# Patient Record
Sex: MALE | Race: BLACK OR AFRICAN AMERICAN | NOT HISPANIC OR LATINO | Employment: OTHER | URBAN - METROPOLITAN AREA
[De-identification: names, ages, dates, MRNs, and addresses within clinical notes are randomized per-mention and may not be internally consistent; named-entity substitution may affect disease eponyms.]

---

## 2018-01-23 ENCOUNTER — DOCUMENTATION ONLY (OUTPATIENT)
Dept: SMOKING CESSATION | Facility: CLINIC | Age: 58
End: 2018-01-23

## 2018-01-23 NOTE — PROGRESS NOTES
"Successful contact at [phone]. Spoke with patient regarding rescheduling missed SCON. He states" I don't need the program because I have not smoke in two weeks". Dicussed the benefits of the program to assist in his quit attempt. States he does not want to participate in the program at this time. Informed patient the program is available when he is ready.   "

## 2018-03-06 ENCOUNTER — CLINICAL SUPPORT (OUTPATIENT)
Dept: SMOKING CESSATION | Facility: CLINIC | Age: 58
End: 2018-03-06
Payer: COMMERCIAL

## 2018-03-06 DIAGNOSIS — F17.210 LIGHT CIGARETTE SMOKER (1-9 CIGARETTES PER DAY): Primary | ICD-10-CM

## 2018-03-06 PROCEDURE — 99404 PREV MED CNSL INDIV APPRX 60: CPT | Mod: S$GLB,,,

## 2018-03-06 PROCEDURE — 99999 PR PBB SHADOW E&M-EST. PATIENT-LVL I: CPT | Mod: PBBFAC,,,

## 2018-03-06 RX ORDER — DIPHENHYDRAMINE HCL 25 MG
4 CAPSULE ORAL
Qty: 50 EACH | Refills: 0 | Status: SHIPPED | OUTPATIENT
Start: 2018-03-06 | End: 2021-10-27 | Stop reason: CLARIF

## 2018-03-06 RX ORDER — IBUPROFEN 200 MG
1 TABLET ORAL DAILY
Qty: 14 PATCH | Refills: 0 | Status: SHIPPED | OUTPATIENT
Start: 2018-03-06 | End: 2018-04-11 | Stop reason: SDUPTHER

## 2018-03-06 NOTE — Clinical Note
Patient here for 1st quit attempt. States he smokes 6-7 cigarettes daily. Reviewed program goals. Obtained medical hx, please review. Agreed to participate in weekly tobacco cessation office  sessions. Will begin the prescribed tobacco cessation medication regime of 14 mg nicotine patch and 4 mg gum. Educated patient how to use and side effects of medications.

## 2018-03-06 NOTE — PROGRESS NOTES
Patient here for 1st quit attempt. States he smokes 6-7 cigarettes daily. Reviewed program goals. Agreed to participate in weekly tobacco cessation office  sessions. Will begin the prescribed tobacco cessation medication regime of 14 mg nicotine patch and 4 mg gum. Educated patient how to use and side effects of medications.

## 2018-03-19 ENCOUNTER — CLINICAL SUPPORT (OUTPATIENT)
Dept: SMOKING CESSATION | Facility: CLINIC | Age: 58
End: 2018-03-19
Payer: COMMERCIAL

## 2018-03-19 DIAGNOSIS — F17.210 CIGARETTE NICOTINE DEPENDENCE, UNCOMPLICATED: Primary | ICD-10-CM

## 2018-03-19 PROCEDURE — 99999 PR PBB SHADOW E&M-EST. PATIENT-LVL I: CPT | Mod: PBBFAC,,,

## 2018-03-19 PROCEDURE — 99404 PREV MED CNSL INDIV APPRX 60: CPT | Mod: S$GLB,,,

## 2018-03-19 RX ORDER — BUMETANIDE 1 MG/1
1 TABLET ORAL 2 TIMES DAILY
COMMUNITY

## 2018-03-19 RX ORDER — AMLODIPINE BESYLATE 10 MG/1
10 TABLET ORAL DAILY
COMMUNITY

## 2018-03-19 RX ORDER — METHOCARBAMOL 500 MG/1
500 TABLET, FILM COATED ORAL 2 TIMES DAILY
COMMUNITY

## 2018-03-19 RX ORDER — ALBUTEROL SULFATE 90 UG/1
2 AEROSOL, METERED RESPIRATORY (INHALATION) EVERY 4 HOURS PRN
COMMUNITY

## 2018-03-19 RX ORDER — LOVASTATIN 40 MG/1
40 TABLET ORAL NIGHTLY
COMMUNITY

## 2018-03-19 RX ORDER — TAMSULOSIN HYDROCHLORIDE 0.4 MG/1
0.4 CAPSULE ORAL DAILY
COMMUNITY

## 2018-03-19 RX ORDER — SPIRONOLACTONE 25 MG/1
25 TABLET ORAL DAILY
COMMUNITY

## 2018-03-19 RX ORDER — CETIRIZINE HYDROCHLORIDE 10 MG/1
10 TABLET ORAL DAILY
COMMUNITY

## 2018-03-19 RX ORDER — MONTELUKAST SODIUM 10 MG/1
10 TABLET ORAL NIGHTLY
COMMUNITY

## 2018-03-19 NOTE — Clinical Note
Patient has not smoked since 3/6/18. Commended him on no smoking. Encouraged him to journal cravings and will discussed it next visit. The patient will continue with tobacco cessation therapy sessions and medication monitoring by CTTS. Prescribed medication management will be by physician.

## 2018-03-19 NOTE — PROGRESS NOTES
Individual Follow-Up Form    3/19/2018    Quit Date: 3/7/18    Clinical Status of Patient: Outpatient    Length of Service: 60 minutes    Continuing Medication: yes  Patches    Other Medications: gum     Target Symptoms: Withdrawal and medication side effects. The following were  rated moderate (3) to severe (4) on TCRS:  · Moderate (3): desire, anxious  · Severe (4): none    Comments: Patient here for first tobacco cessation session. He states no smoking since 3/7. Commended him on no smoking. Reports has difficulty with patch staying on skin due to sweating. Advised to use tape to hold it in place. Use patch sometime but he does use gum to replace cigarette. Reviewed triggers, habits and strategies. Encouraged him to use patch as daily to assist with nicotine withdrawal symptoms. Will journal cravings and discuss next visit. The patient will continue with therapy sessions and medication monitoring by CTTS. Prescribed medication management will be by physician. The patient denies any abnormal behavioral or mental changes at this time.       Diagnosis: F17.210    Next Visit: 1 week

## 2018-03-27 ENCOUNTER — CLINICAL SUPPORT (OUTPATIENT)
Dept: SMOKING CESSATION | Facility: CLINIC | Age: 58
End: 2018-03-27
Payer: COMMERCIAL

## 2018-03-27 DIAGNOSIS — F17.210 CIGARETTE NICOTINE DEPENDENCE, UNCOMPLICATED: Primary | ICD-10-CM

## 2018-03-27 PROCEDURE — 99999 PR PBB SHADOW E&M-EST. PATIENT-LVL I: CPT | Mod: PBBFAC,,,

## 2018-03-27 PROCEDURE — 99404 PREV MED CNSL INDIV APPRX 60: CPT | Mod: S$GLB,,,

## 2018-03-27 RX ORDER — KETOTIFEN FUMARATE 0.35 MG/ML
1 SOLUTION/ DROPS OPHTHALMIC 2 TIMES DAILY
COMMUNITY

## 2018-03-27 RX ORDER — LATANOPROST 50 UG/ML
1 SOLUTION/ DROPS OPHTHALMIC NIGHTLY
COMMUNITY

## 2018-03-27 NOTE — PROGRESS NOTES
Individual Follow-Up Form    3/27/2018    Quit Date: 3/7/2018    Clinical Status of Patient: Outpatient    Length of Service: 60 minutes    Continuing Medication: yes  Patches    Other Medications: lozenges     Target Symptoms: Withdrawal and medication side effects. The following were  rated moderate (3) to severe (4) on TCRS:  · Moderate (3): desrie  · Severe (4): none    Comments: Patient here for second tobacco cessation session. He reports moderate tobacco cravings but has not had a lapse at this time. Reviewed urges and cravings . Discussed  how nicotine affects the heart and lungs. Understanding smoking is habit and making this quit a lifestyle change. Reviewed trigger,habits and strategies. He has great support from his son and family members.The patient denies any abnormal behavioral or mental changes at this time. The patient will continue with group therapy sessions and medication monitoring by CTTS. Prescribed medication management will be by physician.     Diagnosis: F17.210    Next Visit: 1 week

## 2018-03-27 NOTE — Clinical Note
Patient reports no lapse at this time. Commended on quit thus far. Reviewed urges and cravings. The patient denies any abnormal behavioral or mental changes at this time.

## 2018-04-11 ENCOUNTER — CLINICAL SUPPORT (OUTPATIENT)
Dept: SMOKING CESSATION | Facility: CLINIC | Age: 58
End: 2018-04-11
Payer: COMMERCIAL

## 2018-04-11 DIAGNOSIS — F17.210 LIGHT CIGARETTE SMOKER (1-9 CIGARETTES PER DAY): ICD-10-CM

## 2018-04-11 DIAGNOSIS — F17.210 CIGARETTE NICOTINE DEPENDENCE, UNCOMPLICATED: Primary | ICD-10-CM

## 2018-04-11 PROCEDURE — 99404 PREV MED CNSL INDIV APPRX 60: CPT | Mod: S$GLB,,,

## 2018-04-11 PROCEDURE — 99999 PR PBB SHADOW E&M-EST. PATIENT-LVL I: CPT | Mod: PBBFAC,,,

## 2018-04-11 RX ORDER — IBUPROFEN 200 MG
1 TABLET ORAL DAILY
Qty: 14 PATCH | Refills: 0 | Status: SHIPPED | OUTPATIENT
Start: 2018-04-11 | End: 2021-10-27 | Stop reason: CLARIF

## 2018-04-11 NOTE — PROGRESS NOTES
Individual Follow-Up Form    4/11/2018    Quit Date: 3/7/2018    Clinical Status of Patient: Outpatient    Length of Service: 60 minutes    Continuing Medication: yes  Patches    Other Medications: none     Target Symptoms: Withdrawal and medication side effects. The following were  rated moderate (3) to severe (4) on TCRS:  · Moderate (3): none  · Severe (4): none    Comments: Patient here for third tobacco cessation session. He reports doing well with patch. Has experienced some high risk situations and did not smoke. Commended him on using tools learned from program to replace cigarette. Reviewed strategies, controlling environment, cues, triggers, new goals set. Introduced high risk situations with preparation interventions, caffeine similarities with withdrawal issues of habit and nicotine, Alcohol, Understanding urges, cravings, stress and relaxation. Open discussion with intervention discussion. Refilled patches. The patient denies any abnormal behavioral or mental changes at this time.     Diagnosis: F17.210    Next Visit: 2 weeks

## 2018-04-11 NOTE — Clinical Note
Patient is maintaining fquit well at this time. No lapse. Patient remains on prescribed tobacco cessation medication regimen of 14 mg patch without any negative side effects at this time.

## 2018-04-25 ENCOUNTER — CLINICAL SUPPORT (OUTPATIENT)
Dept: SMOKING CESSATION | Facility: CLINIC | Age: 58
End: 2018-04-25
Payer: COMMERCIAL

## 2018-04-25 DIAGNOSIS — F17.210 CIGARETTE NICOTINE DEPENDENCE, UNCOMPLICATED: Primary | ICD-10-CM

## 2018-04-25 PROCEDURE — 99999 PR PBB SHADOW E&M-EST. PATIENT-LVL I: CPT | Mod: PBBFAC,,,

## 2018-04-25 PROCEDURE — 99404 PREV MED CNSL INDIV APPRX 60: CPT | Mod: S$GLB,,,

## 2018-04-25 NOTE — Clinical Note
Patient remains tobacco free. The patient remains on the prescribed tobacco cessation medication regimen of 14 mg patch without any negative side effects at this time.

## 2018-05-09 ENCOUNTER — CLINICAL SUPPORT (OUTPATIENT)
Dept: SMOKING CESSATION | Facility: CLINIC | Age: 58
End: 2018-05-09
Payer: COMMERCIAL

## 2018-05-09 DIAGNOSIS — F17.210 CIGARETTE NICOTINE DEPENDENCE, UNCOMPLICATED: Primary | ICD-10-CM

## 2018-05-09 PROCEDURE — 99999 PR PBB SHADOW E&M-EST. PATIENT-LVL I: CPT | Mod: PBBFAC,,,

## 2018-05-09 PROCEDURE — 99404 PREV MED CNSL INDIV APPRX 60: CPT | Mod: S$GLB,,,

## 2018-05-09 NOTE — Clinical Note
Patient managing quit well. No lapse at this time. Discontinued patch due to skin allergic reaction. The patient denies any abnormal behavioral or mental changes at this time.

## 2018-05-09 NOTE — PROGRESS NOTES
Individual Follow-Up Form    5/9/2018    Quit Date: 3/7/2018    Clinical Status of Patient: Outpatient    Length of Service: 60 minutes    Continuing Medication: no    Other Medications: none     Target Symptoms: Withdrawal and medication side effects. The following were  rated moderate (3) to severe (4) on TCRS:  · Moderate (3): none   · Severe (4): none     Comments: Patient reports he had an allergic response to patch and has not worn it since last visit. Is managing tobacco behaviors well. No lapse or relapse. Celebrated patient no smoking without NRT.  Reviewed examples of lapses or relapses with patient and how to overcome if experienced. Discussed renewing benefits in two weeks. Patient will think about it. The patient denies any abnormal behavioral or mental changes at this time.          Diagnosis: F17.210    Next Visit: 1 week

## 2018-05-16 ENCOUNTER — CLINICAL SUPPORT (OUTPATIENT)
Dept: SMOKING CESSATION | Facility: CLINIC | Age: 58
End: 2018-05-16
Payer: COMMERCIAL

## 2018-05-16 DIAGNOSIS — F17.210 CIGARETTE NICOTINE DEPENDENCE, UNCOMPLICATED: Primary | ICD-10-CM

## 2018-05-16 PROCEDURE — 99999 PR PBB SHADOW E&M-EST. PATIENT-LVL I: CPT | Mod: PBBFAC,,,

## 2018-05-16 PROCEDURE — 99404 PREV MED CNSL INDIV APPRX 60: CPT | Mod: S$GLB,,,

## 2018-05-16 NOTE — PROGRESS NOTES
Individual Follow-Up Form    5/16/2018    Quit Date: 3/7/2018    Clinical Status of Patient: Outpatient    Length of Service: 60 minutes    Continuing Medication: no    Other Medications: none     Target Symptoms: Withdrawal and medication side effects. The following were  rated moderate (3) to severe (4) on TCRS:  · Moderate (3): none  · Severe (4): none    Comments: Patient here for last individual session. Excited to report he was able to remove himself from high risk situation and did not smoke. Celebrated patient accomplishment. Reviewed  strategies, cues, triggers, high risk situations, lapses, relapses, diet, exercise, stress, relaxation, sleep, habitual behavior, and life style changes. Discussed benefits are going to be renewed. States he would like to continue  program another 120 days. The patient denies any abnormal behavioral or mental changes at this time.     Diagnosis: F17.210    Next Visit: 2 weeks

## 2018-05-16 NOTE — Clinical Note
Patient completed tobacco cessation sessions. Quit on 3/7 and has not reported lapse or relapse at this time. Has not used patch in a couple of weeks and doing well without it.

## 2018-05-28 ENCOUNTER — CLINICAL SUPPORT (OUTPATIENT)
Dept: SMOKING CESSATION | Facility: CLINIC | Age: 58
End: 2018-05-28
Payer: COMMERCIAL

## 2018-05-28 DIAGNOSIS — F17.210 CIGARETTE NICOTINE DEPENDENCE, UNCOMPLICATED: Primary | ICD-10-CM

## 2018-05-28 PROCEDURE — 99407 BEHAV CHNG SMOKING > 10 MIN: CPT | Mod: S$GLB,,,

## 2018-05-28 PROCEDURE — 99999 PR PBB SHADOW E&M-EST. PATIENT-LVL I: CPT | Mod: PBBFAC,,,

## 2018-06-14 ENCOUNTER — TELEPHONE (OUTPATIENT)
Dept: SMOKING CESSATION | Facility: CLINIC | Age: 58
End: 2018-06-14

## 2018-06-14 NOTE — TELEPHONE ENCOUNTER
Attempted to contact patient at [phone] to follow up on quit status and reschedule missed appt. Left message.

## 2018-06-25 ENCOUNTER — CLINICAL SUPPORT (OUTPATIENT)
Dept: SMOKING CESSATION | Facility: CLINIC | Age: 58
End: 2018-06-25
Payer: COMMERCIAL

## 2018-06-25 DIAGNOSIS — F17.200 NICOTINE DEPENDENCE: Primary | ICD-10-CM

## 2018-06-25 PROCEDURE — 99407 BEHAV CHNG SMOKING > 10 MIN: CPT | Mod: S$GLB,,, | Performed by: INTERNAL MEDICINE

## 2018-06-25 NOTE — PROGRESS NOTES
Spoke with patient today in regard to smoking cessation progress for 3 month follow up, he states tobacco free. Commended patient on the accomplishment. Informed patient of benefit period, future follow ups, and contact information if any further help or support is needed. Will complete smart form for 3 month follow up on Quit attempt #1.

## 2018-09-07 ENCOUNTER — CLINICAL SUPPORT (OUTPATIENT)
Dept: SMOKING CESSATION | Facility: CLINIC | Age: 58
End: 2018-09-07
Payer: COMMERCIAL

## 2018-09-07 DIAGNOSIS — F17.200 NICOTINE DEPENDENCE: Primary | ICD-10-CM

## 2018-09-07 PROCEDURE — 99407 BEHAV CHNG SMOKING > 10 MIN: CPT | Mod: S$GLB,,, | Performed by: INTERNAL MEDICINE

## 2018-09-07 NOTE — PROGRESS NOTES
Spoke with patient today in regard to smoking cessation progress for 6 month follow up, he states tobacco free since 3/2018. Commended patient on the accomplishment. He states using the nicotine patches and strategies learned from program. Informed patient of benefit period, follow up at 1 year, and contact information if any further help or support is needed. Will complete smart form for 6 month follow up on Quit attempt #1.

## 2019-01-10 RX ORDER — ALBUTEROL SULFATE 1.25 MG/3ML
SOLUTION RESPIRATORY (INHALATION)
Qty: 75 ML | Refills: 0 | Status: SHIPPED | OUTPATIENT
Start: 2019-01-10

## 2019-04-23 ENCOUNTER — CLINICAL SUPPORT (OUTPATIENT)
Dept: SMOKING CESSATION | Facility: CLINIC | Age: 59
End: 2019-04-23
Payer: COMMERCIAL

## 2019-04-23 DIAGNOSIS — F17.200 NICOTINE DEPENDENCE: Primary | ICD-10-CM

## 2019-04-23 PROCEDURE — 99406 BEHAV CHNG SMOKING 3-10 MIN: CPT | Mod: S$GLB,,,

## 2019-04-23 PROCEDURE — 99406 PR TOBACCO USE CESSATION INTERMEDIATE 3-10 MINUTES: ICD-10-PCS | Mod: S$GLB,,,

## 2019-04-23 NOTE — PROGRESS NOTES
Spoke with patient today in regard to smoking cessation progress for 12 month follow up, he is currently tobacco free. Congratulated patient on the quit episode. Informed patient of benefit period,  and contact information if any further help or support is needed.  Completed  smart form for 12 month follow up on Quit .

## 2019-08-20 DIAGNOSIS — M51.36 DEGENERATION OF LUMBAR INTERVERTEBRAL DISC: ICD-10-CM

## 2019-08-20 DIAGNOSIS — M79.601 RIGHT UPPER LIMB PAIN: ICD-10-CM

## 2019-08-20 DIAGNOSIS — M16.11 PRIMARY OSTEOARTHRITIS OF RIGHT HIP: ICD-10-CM

## 2019-08-20 DIAGNOSIS — E66.01 MORBID OBESITY: Primary | ICD-10-CM

## 2019-09-12 ENCOUNTER — CLINICAL SUPPORT (OUTPATIENT)
Dept: REHABILITATION | Facility: OTHER | Age: 59
End: 2019-09-12
Payer: MEDICAID

## 2019-09-12 DIAGNOSIS — M54.50 CHRONIC BILATERAL LOW BACK PAIN WITHOUT SCIATICA: ICD-10-CM

## 2019-09-12 DIAGNOSIS — G89.29 CHRONIC BILATERAL LOW BACK PAIN WITHOUT SCIATICA: ICD-10-CM

## 2019-09-12 PROCEDURE — 97162 PT EVAL MOD COMPLEX 30 MIN: CPT | Mod: PN | Performed by: PHYSICAL THERAPIST
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
## 2019-09-26 ENCOUNTER — CLINICAL SUPPORT (OUTPATIENT)
Dept: REHABILITATION | Facility: OTHER | Age: 59
End: 2019-09-26
Payer: MEDICAID

## 2019-09-26 DIAGNOSIS — G89.29 CHRONIC BILATERAL LOW BACK PAIN WITHOUT SCIATICA: ICD-10-CM

## 2019-09-26 DIAGNOSIS — M54.50 CHRONIC BILATERAL LOW BACK PAIN WITHOUT SCIATICA: ICD-10-CM

## 2019-09-26 PROCEDURE — 97110 THERAPEUTIC EXERCISES: CPT | Mod: PN | Performed by: INTERNAL MEDICINE

## 2019-09-26 NOTE — PROGRESS NOTES
"[facility]  Physical Therapy treatment note      Name: Robinson Reardon Sr.  Clinic Number: [id number]     Therapy Diagnosis:        Encounter Diagnosis   Name Primary?    Chronic bilateral low back pain without sciatica        Physician: Leonor Gupta NP     Physician Orders: PT Eval and Treat   Medical Diagnosis from Referral:   E66.01 (ICD-10-CM) - Morbid (severe) obesity due to excess calories   M51.36 (ICD-10-CM) - Other intervertebral disc degeneration, lumbar region   M16.11 (ICD-10-CM) - Unilateral primary osteoarthritis, right hip   M79.601 (ICD-10-CM) - Pain in right arm      Evaluation Date: 9/12/2019  Authorization Period Expiration: 9/11/2020  Plan of Care Expiration: 12/6/2019  Visit # / Visits authorized: 2/2    visit date 9/26/2019     Time In:  9am  Time Out: 9:40am  Total Billable Time: 40 minutes     Precautions: Standard     Subjective   R ant thigh pain , doing hep . 10/10  Pain today. No heat or ice just ibuprofen for pain . Has a cane but doesn't use it stating he's just trying to push through pain and make it get better.             Pain:  Current  10/10   Location: right anterior thigh       Pts goals: "to get rid of this" and be able to get back to his normal routine without limitations      Objective      Observation: presents to clinic with slow antalgic gait no AD  Posture:  Forward flexed trunk approximately 25 deg with B hip/ knee flexion, decreased weight bearing RLE     9/12 Lumbar Range of Motion:     percentage Pain   Flexion 60/20: 40    painful         Extension -25/-15:10 deg from neutral    Painful R anterior thigh         Left Side Bending 10 painful         Right Side Bending 10 painful         Left rotation    25% painful         Right Rotation    25% painful            R hip ROM limited all planes <50%     9/12 Lower Extremity Strength  Right LE   Left LE     Knee extension: 4/5 Knee extension: 5/5   Knee flexion: 4+/5 Knee flexion: 5/5   Hip " flexion: 3-/5 Hip flexion: 3+/5   Ankle dorsiflexion: 5/5 Ankle dorsiflexion: 5/5            9/12 Neuro Dynamic Testing:               Sciatic nerve:                                       SLR:    R = neg                                      L = neg                                        Femoral Nerve:                          Femoral nerve test: positive R     9/12 Palpation: No TTP R quads, iliopsoas, QL      9/12 Flexibility: unable to tolerate     9/12 Function:  Supine to sit: mod I with increased time and effort  Sit to supine: min A RLE        CMS Impairment/Limitation/Restriction for FOTO Hip Survey     Therapist reviewed FOTO scores for Robinson Reardon  on 9/12/2019.   FOTO documents entered into EPIC - see Media section.     Limitation Score: 75%     Goal: 60%            TREATMENT         Robinson received therapeutic exercises to develop flexibility and core stabilization for 40 minutes including:  Semi reclined due to COPD    Transverse abdominus activation  2  Min ball sq   Diaphragmatic breathing x 3 min  Mini squats 15x  GSS slant  90 sec  ltr ball small oscill 2 min   Quad sets 10 x 5 sec   LAQ 15x   saq 15x   Heel slides with strap 15x  Bridges 15x  Quadruped cat/cow x 10- np     Manual therapy R long axis hip distraction 20 sec x 4 with relief reported. PROM R Hip flex , er , mr  In semi reclined positon as faustino 20 sec x 3.      Home Exercises and Patient Education Provided     Education provided:   - using sc on L UE to decrease gait deficits and decrease R hip inflammation    Written Home Exercises Provided: heel slides, LAQ, mini squats, clams  Exercises were reviewed and Robinson was able to demonstrate them prior to the end of the session.  Robinson demonstrated good  understanding of the education provided.      See EMR under Patient Instructions for exercises provided 9/12/2019, 9/26.     Assessment   Cont pain, decreased r hip rom and gait deficits.  Ed in pool benefits but no access to  pool    Pt prognosis is Fair.   Pt will benefit from skilled outpatient Physical Therapy to address the deficits stated above and in the chart below, provide pt/family education, and to maximize pt's level of independence.         Pt's spiritual, cultural and educational needs considered and patient is agreeable to the plan of care and goals as stated below:      Anticipated Barriers for therapy: previous PT with no benefit seen          Goals:  Short Term Goals: 6 weeks   1. Patient to demonstrate improved lumbar extension ROM by 25% or greater for improved posture and gait  2. Patient to report decreased pain in R thigh by 30% or greater with ADL's  3. Patient to be independent with all bed mobility and transfers      Long Term Goals: 12 weeks   1. Patient to be independent with home exercise program for improved self management of condition  2. Patient to have decreased subjective report of disability as noted by a score of 60% or less on the FOTO hip questionnaire   3. Patient to increased strength in RLE's by 1/2 grade or greater for improved performance of ADL's   4. Patient to increased AROM in R hip to WFL for improved performance of ADL's such as donning shoes        Plan   Plan of care Certification: 9/12/2019 to 12/6/2019.     Outpatient Physical Therapy 2 times weekly for 9 weeks to include the following interventions: Cervical/Lumbar Traction, Gait Training, Manual Therapy, Moist Heat/ Ice, Neuromuscular Re-ed, Patient Education, Therapeutic Activites, Therapeutic Exercise and dry needling prn.

## 2019-10-01 ENCOUNTER — CLINICAL SUPPORT (OUTPATIENT)
Dept: REHABILITATION | Facility: OTHER | Age: 59
End: 2019-10-01
Payer: MEDICAID

## 2019-10-01 DIAGNOSIS — M54.50 CHRONIC BILATERAL LOW BACK PAIN WITHOUT SCIATICA: ICD-10-CM

## 2019-10-01 DIAGNOSIS — G89.29 CHRONIC BILATERAL LOW BACK PAIN WITHOUT SCIATICA: ICD-10-CM

## 2019-10-01 PROCEDURE — 97110 THERAPEUTIC EXERCISES: CPT | Mod: PN | Performed by: PHYSICAL THERAPIST

## 2019-10-01 NOTE — PROGRESS NOTES
"[facility]  Physical Therapy treatment note      Name: Robinson Reardon SrAminta  Clinic Number: [id number]     Therapy Diagnosis:        Encounter Diagnosis   Name Primary?    Chronic bilateral low back pain without sciatica        Physician: Leonor Gupta NP     Physician Orders: PT Eval and Treat   Medical Diagnosis from Referral:   E66.01 (ICD-10-CM) - Morbid (severe) obesity due to excess calories   M51.36 (ICD-10-CM) - Other intervertebral disc degeneration, lumbar region   M16.11 (ICD-10-CM) - Unilateral primary osteoarthritis, right hip   M79.601 (ICD-10-CM) - Pain in right arm      Evaluation Date: 9/12/2019  Authorization Period Expiration: 9/11/2020  Plan of Care Expiration: 12/6/2019  Visit # / Visits authorized: 3/2    visit date 9/26/2019     Time In:  10:00 am  Time Out: 1045 am  Total Billable Time: 30 minutes     Precautions: Standard     Subjective   Pain worse with certain movements when walking or lifting his leg to get into bed.              Pain:  Current  10/10   Location: right anterior thigh       Pts goals: "to get rid of this" and be able to get back to his normal routine without limitations      Objective      Observation: presents to clinic with slow antalgic gait no AD  Posture:  Forward flexed trunk approximately 25 deg with B hip/ knee flexion, decreased weight bearing RLE     9/12 Lumbar Range of Motion:     percentage Pain   Flexion 60/20: 40    painful         Extension -25/-15:10 deg from neutral    Painful R anterior thigh         Left Side Bending 10 painful         Right Side Bending 10 painful         Left rotation    25% painful         Right Rotation    25% painful            R hip ROM limited all planes <50%     9/12 Lower Extremity Strength  Right LE   Left LE     Knee extension: 4/5 Knee extension: 5/5   Knee flexion: 4+/5 Knee flexion: 5/5   Hip flexion: 3-/5 Hip flexion: 3+/5   Ankle dorsiflexion: 5/5 Ankle dorsiflexion: 5/5            9/12 Neuro " Dynamic Testing:               Sciatic nerve:                                       SLR:    R = neg                                      L = neg                                        Femoral Nerve:                          Femoral nerve test: positive R     9/12 Palpation: No TTP R quads, iliopsoas, QL      9/12 Flexibility: unable to tolerate     9/12 Function:  Supine to sit: mod I with increased time and effort  Sit to supine: min A RLE        CMS Impairment/Limitation/Restriction for FOTO Hip Survey     Therapist reviewed FOTO scores for Robinson Reardon . on 9/12/2019.   FOTO documents entered into EPIC - see Media section.     Limitation Score: 75%     Goal: 60%            TREATMENT         Robinson received therapeutic exercises to develop flexibility and core stabilization for 40 minutes including:  Semi reclined due to COPD    Transverse abdominus activation  2  Min ball sq   Diaphragmatic breathing x 3 min  Mini squats 15x  GSS slant  90 sec  ltr ball small oscill 2 min   Quad sets 10 x 5 sec   LAQ 15x   saq 15x   Heel slides with strap 15x  Bridges 15x  Quadruped cat/cow x 10    Recumbent bike x 6 min    Manual therapy R long axis hip distraction 20 sec x 4 with relief reported. PROM R Hip flex , er , mr  In semi reclined positon as faustino 20 sec x 3.      Home Exercises and Patient Education Provided     Education provided:   - using sc on L UE to decrease gait deficits and decrease R hip inflammation    Written Home Exercises Provided: heel slides, LAQ, mini squats, clams  Exercises were reviewed and Robinson was able to demonstrate them prior to the end of the session.  Robinson demonstrated good  understanding of the education provided.      See EMR under Patient Instructions for exercises provided 9/12/2019, 9/26.     Assessment   Pt with increased R anterior thigh pain with transitional movements and weight bearing.     Pt prognosis is Fair.   Pt will benefit from skilled outpatient Physical Therapy to  address the deficits stated above and in the chart below, provide pt/family education, and to maximize pt's level of independence.         Pt's spiritual, cultural and educational needs considered and patient is agreeable to the plan of care and goals as stated below:      Anticipated Barriers for therapy: previous PT with no benefit seen          Goals:  Short Term Goals: 6 weeks   1. Patient to demonstrate improved lumbar extension ROM by 25% or greater for improved posture and gait  2. Patient to report decreased pain in R thigh by 30% or greater with ADL's  3. Patient to be independent with all bed mobility and transfers      Long Term Goals: 12 weeks   1. Patient to be independent with home exercise program for improved self management of condition  2. Patient to have decreased subjective report of disability as noted by a score of 60% or less on the FOTO hip questionnaire   3. Patient to increased strength in RLE's by 1/2 grade or greater for improved performance of ADL's   4. Patient to increased AROM in R hip to WFL for improved performance of ADL's such as donning shoes        Plan   Plan of care Certification: 9/12/2019 to 12/6/2019.     Outpatient Physical Therapy 2 times weekly for 9 weeks to include the following interventions: Cervical/Lumbar Traction, Gait Training, Manual Therapy, Moist Heat/ Ice, Neuromuscular Re-ed, Patient Education, Therapeutic Activites, Therapeutic Exercise and dry needling prn.      Emily Redmond, PT

## 2019-10-03 ENCOUNTER — CLINICAL SUPPORT (OUTPATIENT)
Dept: REHABILITATION | Facility: OTHER | Age: 59
End: 2019-10-03
Payer: MEDICAID

## 2019-10-03 DIAGNOSIS — G89.29 CHRONIC BILATERAL LOW BACK PAIN WITHOUT SCIATICA: ICD-10-CM

## 2019-10-03 DIAGNOSIS — M54.50 CHRONIC BILATERAL LOW BACK PAIN WITHOUT SCIATICA: ICD-10-CM

## 2019-10-03 PROCEDURE — 97110 THERAPEUTIC EXERCISES: CPT | Mod: PN

## 2019-10-03 NOTE — PROGRESS NOTES
"[facility]  Physical Therapy treatment note      Name: Robinson Reardon Sr.  Clinic Number: [id number]     Therapy Diagnosis:        Encounter Diagnosis   Name Primary?    Chronic bilateral low back pain without sciatica        Physician: Leonor Gupta NP     Physician Orders: PT Eval and Treat   Medical Diagnosis from Referral:   E66.01 (ICD-10-CM) - Morbid (severe) obesity due to excess calories   M51.36 (ICD-10-CM) - Other intervertebral disc degeneration, lumbar region   M16.11 (ICD-10-CM) - Unilateral primary osteoarthritis, right hip   M79.601 (ICD-10-CM) - Pain in right arm      Evaluation Date: 9/12/2019  Authorization Period Expiration: 9/11/2020  Plan of Care Expiration: 12/6/2019  Visit # / Visits authorized: 4/2   visit date 9/26/2019     Time In:  9:18 AM  Time Out: 10:00 AM  Total Billable Time: 30 minutes     Precautions: Standard     Subjective   Pt states walking 8-10 blocks today. States having increased R thigh pain with walking today.              Pain:  Current  8/10  Location: right anterior thigh       Pts goals: "to get rid of this" and be able to get back to his normal routine without limitations      Objective      Observation: presents to clinic with slow antalgic gait no AD  Posture:  Forward flexed trunk approximately 25 deg with B hip/ knee flexion, decreased weight bearing RLE     9/12 Lumbar Range of Motion:     percentage Pain   Flexion 60/20: 40    painful         Extension -25/-15:10 deg from neutral    Painful R anterior thigh         Left Side Bending 10 painful         Right Side Bending 10 painful         Left rotation    25% painful         Right Rotation    25% painful            R hip ROM limited all planes <50%     9/12 Lower Extremity Strength  Right LE   Left LE     Knee extension: 4/5 Knee extension: 5/5   Knee flexion: 4+/5 Knee flexion: 5/5   Hip flexion: 3-/5 Hip flexion: 3+/5   Ankle dorsiflexion: 5/5 Ankle dorsiflexion: 5/5            9/12 " Neuro Dynamic Testing:               Sciatic nerve:                                       SLR:    R = neg                                      L = neg                                        Femoral Nerve:                          Femoral nerve test: positive R     9/12 Palpation: No TTP R quads, iliopsoas, QL      9/12 Flexibility: unable to tolerate     9/12 Function:  Supine to sit: mod I with increased time and effort  Sit to supine: min A RLE        CMS Impairment/Limitation/Restriction for FOTO Hip Survey     Therapist reviewed FOTO scores for Robinson Reardon . on 9/12/2019.   FOTO documents entered into EPIC - see Media section.     Limitation Score: 75%     Goal: 60%            TREATMENT         Robinson received therapeutic exercises to develop flexibility and core stabilization for 40 minutes including:  Semi reclined due to COPD    Transverse abdominus activation  2  Min ball sq   Diaphragmatic breathing x 3 min  Mini squats 15x  GSS slant  90 sec  ltr ball small oscill 2 min   Quad sets 10 x 5 sec   LAQ 15x 2#  saq 15x 2#  Heel slides with strap 15x  Bridges 15x  Quadruped cat/cow x 10    Recumbent bike x 6 min    Manual therapy R long axis hip distraction 20 sec x 4 with relief reported. PROM R Hip flex , er , mr  In semi reclined positon as faustino 20 sec x 3.      Home Exercises and Patient Education Provided     Education provided:   - using sc on L UE to decrease gait deficits and decrease R hip inflammation    Written Home Exercises Provided: heel slides, LAQ, mini squats, clams  Exercises were reviewed and Robinson was able to demonstrate them prior to the end of the session.  Robinson demonstrated good  understanding of the education provided.      See EMR under Patient Instructions for exercises provided 9/12/2019, 9/26.     Assessment   Fair tolerance to therex today. Pt with facial grimacing during bed mobility and transitional movements. Increased resistance with SAQ / LAQ without any reports of  pain.   Pt prognosis is Fair.   Pt will benefit from skilled outpatient Physical Therapy to address the deficits stated above and in the chart below, provide pt/family education, and to maximize pt's level of independence.         Pt's spiritual, cultural and educational needs considered and patient is agreeable to the plan of care and goals as stated below:      Anticipated Barriers for therapy: previous PT with no benefit seen          Goals:  Short Term Goals: 6 weeks   1. Patient to demonstrate improved lumbar extension ROM by 25% or greater for improved posture and gait  2. Patient to report decreased pain in R thigh by 30% or greater with ADL's  3. Patient to be independent with all bed mobility and transfers      Long Term Goals: 12 weeks   1. Patient to be independent with home exercise program for improved self management of condition  2. Patient to have decreased subjective report of disability as noted by a score of 60% or less on the FOTO hip questionnaire   3. Patient to increased strength in RLE's by 1/2 grade or greater for improved performance of ADL's   4. Patient to increased AROM in R hip to WFL for improved performance of ADL's such as donning shoes        Plan   Plan of care Certification: 9/12/2019 to 12/6/2019.     Outpatient Physical Therapy 2 times weekly for 9 weeks to include the following interventions: Cervical/Lumbar Traction, Gait Training, Manual Therapy, Moist Heat/ Ice, Neuromuscular Re-ed, Patient Education, Therapeutic Activites, Therapeutic Exercise and dry needling prn.      Jean Ellsworth, PTA

## 2019-10-08 ENCOUNTER — CLINICAL SUPPORT (OUTPATIENT)
Dept: REHABILITATION | Facility: OTHER | Age: 59
End: 2019-10-08
Payer: MEDICAID

## 2019-10-08 DIAGNOSIS — G89.29 CHRONIC BILATERAL LOW BACK PAIN WITHOUT SCIATICA: ICD-10-CM

## 2019-10-08 DIAGNOSIS — M54.50 CHRONIC BILATERAL LOW BACK PAIN WITHOUT SCIATICA: ICD-10-CM

## 2019-10-08 PROCEDURE — 97110 THERAPEUTIC EXERCISES: CPT | Mod: PN | Performed by: PHYSICAL THERAPIST

## 2019-10-08 NOTE — PROGRESS NOTES
"[facility]  Physical Therapy treatment note      Name: Robinson Reardon Sr.  Clinic Number: [id number]     Therapy Diagnosis:        Encounter Diagnosis   Name Primary?    Chronic bilateral low back pain without sciatica        Physician: Leonor Gupta NP     Physician Orders: PT Eval and Treat   Medical Diagnosis from Referral:   E66.01 (ICD-10-CM) - Morbid (severe) obesity due to excess calories   M51.36 (ICD-10-CM) - Other intervertebral disc degeneration, lumbar region   M16.11 (ICD-10-CM) - Unilateral primary osteoarthritis, right hip   M79.601 (ICD-10-CM) - Pain in right arm      Evaluation Date: 9/12/2019  Authorization Period Expiration: 9/11/2020  Plan of Care Expiration: 12/6/2019  Visit # / Visits authorized: 6/2   visit date 10/8/2019    Time In:  10:00 AM  Time Out: 10:45 AM  Total Billable Time: 40 minutes     Precautions: Standard     Subjective   Pt states pain in R thigh the same. He is interested in dry needling next visit.              Pain:  Current  8/10  Location: right anterior thigh       Pts goals: "to get rid of this" and be able to get back to his normal routine without limitations      Objective      Observation: presents to clinic with slow antalgic gait no AD  Posture:  Forward flexed trunk approximately 25 deg with B hip/ knee flexion, decreased weight bearing RLE     9/12 Lumbar Range of Motion:     percentage Pain   Flexion 60/20: 40    painful         Extension -25/-15:10 deg from neutral    Painful R anterior thigh         Left Side Bending 10 painful         Right Side Bending 10 painful         Left rotation    25% painful         Right Rotation    25% painful            R hip ROM limited all planes <50%     9/12 Lower Extremity Strength  Right LE   Left LE     Knee extension: 4/5 Knee extension: 5/5   Knee flexion: 4+/5 Knee flexion: 5/5   Hip flexion: 3-/5 Hip flexion: 3+/5   Ankle dorsiflexion: 5/5 Ankle dorsiflexion: 5/5            9/12 Neuro Dynamic " Testing:               Sciatic nerve:                                       SLR:    R = neg                                      L = neg                                        Femoral Nerve:                          Femoral nerve test: positive R     9/12 Palpation: No TTP R quads, iliopsoas, QL      9/12 Flexibility: unable to tolerate     9/12 Function:  Supine to sit: mod I with increased time and effort  Sit to supine: min A RLE        CMS Impairment/Limitation/Restriction for FOTO Hip Survey     Therapist reviewed FOTO scores for Robinson Reardon . on 9/12/2019.   FOTO documents entered into EPIC - see Media section.     Limitation Score: 75%     Goal: 60%            TREATMENT         Robinson received therapeutic exercises to develop flexibility and core stabilization for 40 minutes including:  Semi reclined due to COPD    +prone on elbows 2 min  +prone knee flexion x 20  Transverse abdominus activation  2  Min ball sq   Diaphragmatic breathing x 3 min  Mini squats 15x  GSS slant  90 sec  ltr ball small oscill 2 min   Quad sets 10 x 5 sec   LAQ 15x 2#  saq 15x 2#  Heel slides with strap 15x  Bridges 15x- nt  Quadruped cat/cow x 10- nt    Recumbent bike x 6 min    Manual therapy R long axis hip distraction 20 sec x 4 with relief reported. PROM R Hip flex , er , mr  In semi reclined positon as faustino 20 sec x 3. - nt     Home Exercises and Patient Education Provided     Education provided:   - using sc on L UE to decrease gait deficits and decrease R hip inflammation    Written Home Exercises Provided: heel slides, LAQ, mini squats, clams  Exercises were reviewed and Robinson was able to demonstrate them prior to the end of the session.  Robinson demonstrated good  understanding of the education provided.      See EMR under Patient Instructions for exercises provided 9/12/2019, 9/26.     Assessment   Fair tolerance to therex today with exacerbation of pain with all transitional movements and weight bearing. Poor  tolerance to supine positioning due to COPD. Pt to try FDN for management of R thigh pain  Pt prognosis is Fair.   Pt will benefit from skilled outpatient Physical Therapy to address the deficits stated above and in the chart below, provide pt/family education, and to maximize pt's level of independence.         Pt's spiritual, cultural and educational needs considered and patient is agreeable to the plan of care and goals as stated below:      Anticipated Barriers for therapy: previous PT with no benefit seen          Goals:  Short Term Goals: 6 weeks   1. Patient to demonstrate improved lumbar extension ROM by 25% or greater for improved posture and gait  2. Patient to report decreased pain in R thigh by 30% or greater with ADL's  3. Patient to be independent with all bed mobility and transfers      Long Term Goals: 12 weeks   1. Patient to be independent with home exercise program for improved self management of condition  2. Patient to have decreased subjective report of disability as noted by a score of 60% or less on the FOTO hip questionnaire   3. Patient to increased strength in RLE's by 1/2 grade or greater for improved performance of ADL's   4. Patient to increased AROM in R hip to WFL for improved performance of ADL's such as donning shoes        Plan   Plan of care Certification: 9/12/2019 to 12/6/2019.     Outpatient Physical Therapy 2 times weekly for 9 weeks to include the following interventions: Cervical/Lumbar Traction, Gait Training, Manual Therapy, Moist Heat/ Ice, Neuromuscular Re-ed, Patient Education, Therapeutic Activites, Therapeutic Exercise and dry needling prn.      Emily Redmond, PT

## 2019-10-10 ENCOUNTER — CLINICAL SUPPORT (OUTPATIENT)
Dept: REHABILITATION | Facility: OTHER | Age: 59
End: 2019-10-10
Payer: MEDICAID

## 2019-10-10 DIAGNOSIS — M54.50 CHRONIC BILATERAL LOW BACK PAIN WITHOUT SCIATICA: ICD-10-CM

## 2019-10-10 DIAGNOSIS — G89.29 CHRONIC BILATERAL LOW BACK PAIN WITHOUT SCIATICA: ICD-10-CM

## 2019-10-10 PROCEDURE — 97110 THERAPEUTIC EXERCISES: CPT | Mod: PN

## 2019-10-10 NOTE — PROGRESS NOTES
Dry Needling Daily Note     Patient ID: Robinson Reardon Sr. is a 59 y.o. male.  Diagnosis:   1. Chronic bilateral low back pain without sciatica       Date:  10/10/2019    Start Time:  9:15  Stop Time:  9:40    Subjective:     Pt reports: continued R thigh pain without change since starting therapy  Pain Scale: Robinson rates pain on a scale of 0-10 to be 10 currently.    Objective:     Pt signed written consent to dry needling Rx.  Pt gave verbal consent for DN.   Pt rec'd dry needling to R thigh with 3 in needles with no adverse effects.    Homeostatic points:  1. Deep Radial  2. Greater Auricular  3. Spinal Accessory  4. Saphenous  5. Deep Fibular  6. Tibial  7. Greater Occipital  8. Suprascapular ( infraspinatus)  9. Lateral Antebrachial Cutaneous  10. Sural  11. Lateral Popliteal  12. Superficial Radial  13. Dorsal Scapular  14. Superior Cluneal  15. Posterior Cutaneous L 2  16. Inferior Gluteal  17. Lateral Pectoral  18. Ilitotibal  19. Infraorbital  20. Spinous process T7  21. Posterior cutaneous  T6  22. Posterior cutaneous L 5  23. Supraorbital  24. Common fibular    Paravertebral Points:  none    Symptomatic Points:   Threading of distal quad, adductors, proximal quads    Assessment:     Patient demonstrated appropriate response to FDN. Winding technique used every 5 minutes. Notable reduction of muscle tone after DN session. Pt reports reduction of pain from 10/10 to 5/10 following DN/    Patient Education/Response:     Education provided re:   Purpose, benefits, and potential side effects of dry needling.   Educated pt to use heat following treatment sessions to reduce c/o pain or soreness and to improve circulation to needled sites.   Encouraged pt to continue with HEP to maintain flexibility, ROM, and functional mobility.  Robinson verbalized good understanding of education     Plans and Goals:     Monitor response to FDN. Continue with FDN in POC as tolerated.     Pauline Nair,  PT  10/10/2019

## 2019-10-10 NOTE — PROGRESS NOTES
"[facility]  Physical Therapy treatment note      Name: Robinson Reardon Sr.  Clinic Number: [id number]     Therapy Diagnosis:        Encounter Diagnosis   Name Primary?    Chronic bilateral low back pain without sciatica        Physician: Leonor Gupta NP     Physician Orders: PT Eval and Treat   Medical Diagnosis from Referral:   E66.01 (ICD-10-CM) - Morbid (severe) obesity due to excess calories   M51.36 (ICD-10-CM) - Other intervertebral disc degeneration, lumbar region   M16.11 (ICD-10-CM) - Unilateral primary osteoarthritis, right hip   M79.601 (ICD-10-CM) - Pain in right arm      Evaluation Date: 9/12/2019  Authorization Period Expiration: 9/11/2020  Plan of Care Expiration: 12/6/2019  Visit # / Visits authorized: 7/2   visit date 10/10/2019    Time In:  10:00 AM  Time Out: 11:00 AM  Total Billable Time: 30 minutes     Precautions: Standard     Subjective     Pt states no change in R thigh pain since previous visit. He is interested in dry needling today.              Pain:  Current  10/10  Location: right anterior thigh       Pts goals: "to get rid of this" and be able to get back to his normal routine without limitations      Objective      Observation: presents to clinic with slow antalgic gait no AD  Posture:  Forward flexed trunk approximately 25 deg with B hip/ knee flexion, decreased weight bearing RLE     9/12 Lumbar Range of Motion:     percentage Pain   Flexion 60/20: 40    painful         Extension -25/-15:10 deg from neutral    Painful R anterior thigh         Left Side Bending 10 painful         Right Side Bending 10 painful         Left rotation    25% painful         Right Rotation    25% painful            R hip ROM limited all planes <50%     9/12 Lower Extremity Strength  Right LE   Left LE     Knee extension: 4/5 Knee extension: 5/5   Knee flexion: 4+/5 Knee flexion: 5/5   Hip flexion: 3-/5 Hip flexion: 3+/5   Ankle dorsiflexion: 5/5 Ankle dorsiflexion: 5/5          "   9/12 Neuro Dynamic Testing:               Sciatic nerve:                                       SLR:    R = neg                                      L = neg                                        Femoral Nerve:                          Femoral nerve test: positive R     9/12 Palpation: No TTP R quads, iliopsoas, QL      9/12 Flexibility: unable to tolerate     9/12 Function:  Supine to sit: mod I with increased time and effort  Sit to supine: min A RLE        CMS Impairment/Limitation/Restriction for FOTO Hip Survey     Therapist reviewed FOTO scores for Robinson Reardon  on 9/12/2019.   FOTO documents entered into EPIC - see Media section.     Limitation Score: 75%     Goal: 60%            TREATMENT         Robinson received therapeutic exercises to develop flexibility and core stabilization for 25 minutes including:  Semi reclined due to COPD    **Exercises in BOLD performed today**    prone on elbows 2 min  prone knee flexion x 20  Transverse abdominus activation  2  Min ball sq   Diaphragmatic breathing x 3 min  Mini squats 15x  GSS slant  90 sec  ltr ball small oscill 2 min   Quad sets 10 x 5 sec   LAQ 15x 2#  saq 15x 2#  Heel slides with strap 15x  Bridges 15x- nt  Quadruped cat/cow x 10- nt    Recumbent bike x 6 min    Manual therapy R long axis hip distraction 20 sec x 4 with relief reported. PROM R Hip flex , er , mr  In semi reclined positon as faustino 20 sec x 3. - nt  25 min x dry needling - see note by Pauline Salazar, PT    Modalities: 10 min x MHP to R thigh at end of session     Home Exercises and Patient Education Provided     Education provided:   - using sc on L UE to decrease gait deficits and decrease R hip inflammation    Written Home Exercises Provided: heel slides, LAQ, mini squats, clams  Exercises were reviewed and Robinson was able to demonstrate them prior to the end of the session.  Robinson demonstrated good  understanding of the education provided.      See EMR under Patient Instructions  for exercises provided 9/12/2019, 9/26.     Assessment     initiated dry needling today. Pt demonstrates improved gait with improved TKE and stance time on RLE following DN. Pain reduced from 10/10 to 5/10 by end of session following manual therapy.     Pt prognosis is Fair.   Pt will benefit from skilled outpatient Physical Therapy to address the deficits stated above and in the chart below, provide pt/family education, and to maximize pt's level of independence.         Pt's spiritual, cultural and educational needs considered and patient is agreeable to the plan of care and goals as stated below:      Anticipated Barriers for therapy: previous PT with no benefit seen          Goals:  Short Term Goals: 6 weeks   1. Patient to demonstrate improved lumbar extension ROM by 25% or greater for improved posture and gait  2. Patient to report decreased pain in R thigh by 30% or greater with ADL's  3. Patient to be independent with all bed mobility and transfers      Long Term Goals: 12 weeks   1. Patient to be independent with home exercise program for improved self management of condition  2. Patient to have decreased subjective report of disability as noted by a score of 60% or less on the FOTO hip questionnaire   3. Patient to increased strength in RLE's by 1/2 grade or greater for improved performance of ADL's   4. Patient to increased AROM in R hip to WFL for improved performance of ADL's such as donning shoes        Plan   Plan of care Certification: 9/12/2019 to 12/6/2019.     Outpatient Physical Therapy 2 times weekly for 9 weeks to include the following interventions: Cervical/Lumbar Traction, Gait Training, Manual Therapy, Moist Heat/ Ice, Neuromuscular Re-ed, Patient Education, Therapeutic Activites, Therapeutic Exercise and dry needling prn.      Pauline Nair, PT

## 2019-10-15 ENCOUNTER — CLINICAL SUPPORT (OUTPATIENT)
Dept: REHABILITATION | Facility: OTHER | Age: 59
End: 2019-10-15
Payer: MEDICAID

## 2019-10-15 DIAGNOSIS — M54.50 CHRONIC BILATERAL LOW BACK PAIN WITHOUT SCIATICA: ICD-10-CM

## 2019-10-15 DIAGNOSIS — G89.29 CHRONIC BILATERAL LOW BACK PAIN WITHOUT SCIATICA: ICD-10-CM

## 2019-10-15 PROCEDURE — 97110 THERAPEUTIC EXERCISES: CPT | Mod: PN

## 2019-10-15 NOTE — PROGRESS NOTES
"[facility]  Physical Therapy treatment note      Name: Robinson Reardon Sr.  Clinic Number: [id number]     Therapy Diagnosis:        Encounter Diagnosis   Name Primary?    Chronic bilateral low back pain without sciatica        Physician: Leonor Gupta NP     Physician Orders: PT Eval and Treat   Medical Diagnosis from Referral:   E66.01 (ICD-10-CM) - Morbid (severe) obesity due to excess calories   M51.36 (ICD-10-CM) - Other intervertebral disc degeneration, lumbar region   M16.11 (ICD-10-CM) - Unilateral primary osteoarthritis, right hip   M79.601 (ICD-10-CM) - Pain in right arm      Evaluation Date: 9/12/2019  Authorization Period Expiration: 9/11/2020  Plan of Care Expiration: 12/6/2019  Visit # / Visits authorized: 8/2   visit date 10/15/2019    Time In:  9:00 AM  Time Out: 10:00 AM  Total Billable Time: 45  minutes     Precautions: Standard     Subjective     States being in a lot of pain today. States the dry needle provided him some relief last session.              Pain:  Current  9/10  Location: right anterior thigh       Pts goals: "to get rid of this" and be able to get back to his normal routine without limitations      Objective      Observation: presents to clinic with slow antalgic gait no AD  Posture:  Forward flexed trunk approximately 25 deg with B hip/ knee flexion, decreased weight bearing RLE     9/12 Lumbar Range of Motion:     percentage Pain   Flexion 60/20: 40    painful         Extension -25/-15:10 deg from neutral    Painful R anterior thigh         Left Side Bending 10 painful         Right Side Bending 10 painful         Left rotation    25% painful         Right Rotation    25% painful            R hip ROM limited all planes <50%     9/12 Lower Extremity Strength  Right LE   Left LE     Knee extension: 4/5 Knee extension: 5/5   Knee flexion: 4+/5 Knee flexion: 5/5   Hip flexion: 3-/5 Hip flexion: 3+/5   Ankle dorsiflexion: 5/5 Ankle dorsiflexion: 5/5          "   9/12 Neuro Dynamic Testing:               Sciatic nerve:                                       SLR:    R = neg                                      L = neg                                        Femoral Nerve:                          Femoral nerve test: positive R     9/12 Palpation: No TTP R quads, iliopsoas, QL      9/12 Flexibility: unable to tolerate     9/12 Function:  Supine to sit: mod I with increased time and effort  Sit to supine: min A RLE        CMS Impairment/Limitation/Restriction for FOTO Hip Survey     Therapist reviewed FOTO scores for Robinson Reardon  on 9/12/2019.   FOTO documents entered into EPIC - see Media section.     Limitation Score: 75%     Goal: 60%            TREATMENT         Robinson received therapeutic exercises to develop flexibility and core stabilization for 45 minutes including:  Semi reclined due to COPD    **Exercises in BOLD performed today**    prone on elbows 2 min  prone knee flexion x 20  Transverse abdominus activation  2  Min ball sq   Diaphragmatic breathing x 3 min  Mini squats 15x  GSS slant  90 sec  ltr ball small oscill 2 min   Quad sets 10 x 5 sec   LAQ 15x 2#  saq 15x 2#  Heel slides with strap 15x  Bridges 15x  SL clamshells 15x  SL hip abd 15x  Quadruped cat/cow x 10- nt    Recumbent bike x 6 min    Manual therapy R long axis hip distraction 20 sec x 4 with relief reported. PROM R Hip flex , er , mr  In semi reclined positon as faustino 20 sec x 3. -   00 min x dry needling - see note by Pauline Salazar, PT    Modalities: 10 min x MHP to R thigh at end of session     Home Exercises and Patient Education Provided     Education provided:   - using sc on L UE to decrease gait deficits and decrease R hip inflammation    Written Home Exercises Provided: heel slides, LAQ, mini squats, clams  Exercises were reviewed and Robinson was able to demonstrate them prior to the end of the session.  Robinson demonstrated good  understanding of the education provided.      See EMR  under Patient Instructions for exercises provided 9/12/2019, 9/26.     Assessment   Fair tolerance to therex today. Pt with frequent reports of R anterior hip pain during bed mobility. Pt would like to perform dry needle again next session.       Pt prognosis is Fair.   Pt will benefit from skilled outpatient Physical Therapy to address the deficits stated above and in the chart below, provide pt/family education, and to maximize pt's level of independence.         Pt's spiritual, cultural and educational needs considered and patient is agreeable to the plan of care and goals as stated below:      Anticipated Barriers for therapy: previous PT with no benefit seen          Goals:  Short Term Goals: 6 weeks   1. Patient to demonstrate improved lumbar extension ROM by 25% or greater for improved posture and gait  2. Patient to report decreased pain in R thigh by 30% or greater with ADL's  3. Patient to be independent with all bed mobility and transfers      Long Term Goals: 12 weeks   1. Patient to be independent with home exercise program for improved self management of condition  2. Patient to have decreased subjective report of disability as noted by a score of 60% or less on the FOTO hip questionnaire   3. Patient to increased strength in RLE's by 1/2 grade or greater for improved performance of ADL's   4. Patient to increased AROM in R hip to WFL for improved performance of ADL's such as donning shoes        Plan   Plan of care Certification: 9/12/2019 to 12/6/2019.     Outpatient Physical Therapy 2 times weekly for 9 weeks to include the following interventions: Cervical/Lumbar Traction, Gait Training, Manual Therapy, Moist Heat/ Ice, Neuromuscular Re-ed, Patient Education, Therapeutic Activites, Therapeutic Exercise and dry needling prn.      Jean Ellsworth, PTA

## 2019-10-24 ENCOUNTER — CLINICAL SUPPORT (OUTPATIENT)
Dept: REHABILITATION | Facility: OTHER | Age: 59
End: 2019-10-24
Payer: MEDICAID

## 2019-10-24 DIAGNOSIS — G89.29 CHRONIC BILATERAL LOW BACK PAIN WITHOUT SCIATICA: ICD-10-CM

## 2019-10-24 DIAGNOSIS — M54.50 CHRONIC BILATERAL LOW BACK PAIN WITHOUT SCIATICA: ICD-10-CM

## 2019-10-24 PROCEDURE — 97110 THERAPEUTIC EXERCISES: CPT | Mod: PN | Performed by: PHYSICAL THERAPIST

## 2019-10-24 NOTE — PROGRESS NOTES
"[facility]  Physical Therapy treatment note      Name: Robinson Reardon SrAminta  Clinic Number: [id number]     Therapy Diagnosis:        Encounter Diagnosis   Name Primary?    Chronic bilateral low back pain without sciatica        Physician: Leonor Gupta NP     Physician Orders: PT Eval and Treat   Medical Diagnosis from Referral:   E66.01 (ICD-10-CM) - Morbid (severe) obesity due to excess calories   M51.36 (ICD-10-CM) - Other intervertebral disc degeneration, lumbar region   M16.11 (ICD-10-CM) - Unilateral primary osteoarthritis, right hip   M79.601 (ICD-10-CM) - Pain in right arm      Evaluation Date: 9/12/2019  Authorization Period Expiration: 9/11/2020  Plan of Care Expiration: 12/6/2019  Visit # / Visits authorized: 9/2   visit date 10/24/2019    Time In:  10:00 AM  Time Out: 11:00 AM  Total Billable Time: 45  minutes     Precautions: Standard     Subjective     Having to miss the last few appointments due to his son getting hurt during football and needing to stay home from school. The dry needling helped last visit and interested in it again this session             Pain:  Current  8/10  Location: right anterior thigh       Pts goals: "to get rid of this" and be able to get back to his normal routine without limitations      Objective      Observation: presents to clinic with slow antalgic gait no AD  Posture:  Forward flexed trunk approximately 25 deg with B hip/ knee flexion, decreased weight bearing RLE     10/24 Lumbar Range of Motion:     percentage Pain   Flexion 60/20: 40    painful         Extension -15/0:0 deg    Painful R anterior thigh         Left Side Bending 15 painful         Right Side Bending 15 painful         Left rotation    40% painful         Right Rotation    40% painful            R hip ROM:    Flexion PROM: R: L: deg     9/12 Lower Extremity Strength  Right LE   Left LE     Knee extension: 4/5 Knee extension: 5/5   Knee flexion: 4+/5 Knee flexion: 5/5   Hip " flexion: 3-/5 Hip flexion: 3+/5   Ankle dorsiflexion: 5/5 Ankle dorsiflexion: 5/5            9/12 Neuro Dynamic Testing:               Sciatic nerve:                                       SLR:    R = neg                                      L = neg                                        Femoral Nerve:                          Femoral nerve test: positive R      9/12 Function:  Supine to sit: mod I with increased time and effort  Sit to supine: min A RLE        CMS Impairment/Limitation/Restriction for FOTO Hip Survey     Therapist reviewed FOTO scores for Robinson Reardon  on 10/1/2019.   FOTO documents entered into EPIC - see Media section.     Limitation Score: 70% (75% on eval)     Goal: 60%            TREATMENT         Robinson received therapeutic exercises to develop flexibility and core stabilization for 45 minutes including:  Semi reclined due to COPD    **Exercises in BOLD performed today**    prone on elbows 2 min  prone knee flexion x 20  Transverse abdominus activation  2  Min ball sq   Diaphragmatic breathing x 3 min  Mini squats 15x  GSS slant  90 sec  ltr ball small oscill 2 min   Quad sets 10 x 5 sec   LAQ 15x 2#  saq 15x 2#  Heel slides with strap 15x  Bridges 15x  SL clamshells 15x  SL hip abd 15x  Quadruped cat/cow x 10    Recumbent bike x 6 min    Application of FDN: Pt educated on benefits and potential side effects of dry needling. Educated pt on benefits, precautions, side effects followign IDN. Educated pt to use heat following treatment sessions if pt is experiencing pain or soreness. Pt verbalized good understanding of education.  Pt signed written consent to dry needling Rx. Pt gave verbal consent for DN    Pt received dry needling to the below listed muscles using 60mm needles.  VMO R  Rectus femoris R  Vastus lateralis R  Addustor longus R    Manual therapy R long axis hip distraction 20 sec x 4 with relief reported. PROM R Hip flex , er , mr  In semi reclined positon as faustino 20 sec x 3. -    00 min x dry needling - see note by Pauline Salazar, PT    Modalities: 10 min x MHP to R thigh at end of session     Home Exercises and Patient Education Provided     Education provided:   - using sc on L UE to decrease gait deficits and decrease R hip inflammation    Written Home Exercises Provided: heel slides, LAQ, mini squats, clams  Exercises were reviewed and Robinson was able to demonstrate them prior to the end of the session.  Robinson demonstrated good  understanding of the education provided.      See EMR under Patient Instructions for exercises provided 9/12/2019, 9/26.     Assessment   Pt tolerated treatment well with month progress note performed. Pt demonstrating improvements in lumbar extension to neutral, slight increased in weight shifting for side bending/ rotation, and subjective report of disability from 75 to 70%. Temporary relief with dry needling for management of R anterior thigh with second session performed. No adverse effects noted.       Pt prognosis is Fair.   Pt will benefit from skilled outpatient Physical Therapy to address the deficits stated above and in the chart below, provide pt/family education, and to maximize pt's level of independence.         Pt's spiritual, cultural and educational needs considered and patient is agreeable to the plan of care and goals as stated below:      Anticipated Barriers for therapy: previous PT with no benefit seen          Goals:  Short Term Goals: 6 weeks   1. Patient to demonstrate improved lumbar extension ROM by 25% or greater for improved posture and gait-met 10/24/2019  2. Patient to report decreased pain in R thigh by 30% or greater with ADL's- progressing, not met  3. Patient to be independent with all bed mobility and transfers - met 10/24/2019     Long Term Goals: 12 weeks   1. Patient to be independent with home exercise program for improved self management of condition- progressing, not met  2. Patient to have decreased subjective  report of disability as noted by a score of 60% or less on the FOTO hip questionnaire - progressing, not met  3. Patient to increased strength in RLE's by 1/2 grade or greater for improved performance of ADL's - progressing, not met  4. Patient to increased AROM in R hip to WFL for improved performance of ADL's such as donning shoes- progressing, not met        Plan   Plan of care Certification: 9/12/2019 to 12/6/2019.     Outpatient Physical Therapy 2 times weekly for 9 weeks to include the following interventions: Cervical/Lumbar Traction, Gait Training, Manual Therapy, Moist Heat/ Ice, Neuromuscular Re-ed, Patient Education, Therapeutic Activites, Therapeutic Exercise and dry needling prn.      Emily Redmond, PT

## 2019-11-05 ENCOUNTER — CLINICAL SUPPORT (OUTPATIENT)
Dept: REHABILITATION | Facility: OTHER | Age: 59
End: 2019-11-05
Payer: MEDICAID

## 2019-11-05 DIAGNOSIS — G89.29 CHRONIC BILATERAL LOW BACK PAIN WITHOUT SCIATICA: ICD-10-CM

## 2019-11-05 DIAGNOSIS — M54.50 CHRONIC BILATERAL LOW BACK PAIN WITHOUT SCIATICA: ICD-10-CM

## 2019-11-05 PROCEDURE — 97110 THERAPEUTIC EXERCISES: CPT | Mod: PN

## 2019-11-05 NOTE — PROGRESS NOTES
"[facility]  Physical Therapy treatment note      Name: Robinson Reardon SrAminta  Clinic Number: [id number]     Therapy Diagnosis:        Encounter Diagnosis   Name Primary?    Chronic bilateral low back pain without sciatica        Physician: Leonor Gupta NP     Physician Orders: PT Eval and Treat   Medical Diagnosis from Referral:   E66.01 (ICD-10-CM) - Morbid (severe) obesity due to excess calories   M51.36 (ICD-10-CM) - Other intervertebral disc degeneration, lumbar region   M16.11 (ICD-10-CM) - Unilateral primary osteoarthritis, right hip   M79.601 (ICD-10-CM) - Pain in right arm      Evaluation Date: 9/12/2019  Authorization Period Expiration: 9/11/2020  Plan of Care Expiration: 12/6/2019  Visit # / Visits authorized: 9/2   visit date 11/5/2019    Time In:  10:00 AM  Time Out: 11:10 AM  Total Billable Time: 60  minutes     Precautions: Standard     Subjective     Would like try needling to his back today as his back has been bothering him more.         Pain:  Current  8/10  Location: right anterior thigh       Pts goals: "to get rid of this" and be able to get back to his normal routine without limitations      Objective      Observation: presents to clinic with slow antalgic gait no AD  Posture:  Forward flexed trunk approximately 25 deg with B hip/ knee flexion, decreased weight bearing RLE     10/24 Lumbar Range of Motion:     percentage Pain   Flexion 60/20: 40    painful         Extension -15/0:0 deg    Painful R anterior thigh         Left Side Bending 15 painful         Right Side Bending 15 painful         Left rotation    40% painful         Right Rotation    40% painful            R hip ROM:    Flexion PROM: R: L: deg     9/12 Lower Extremity Strength  Right LE   Left LE     Knee extension: 4/5 Knee extension: 5/5   Knee flexion: 4+/5 Knee flexion: 5/5   Hip flexion: 3-/5 Hip flexion: 3+/5   Ankle dorsiflexion: 5/5 Ankle dorsiflexion: 5/5            9/12 Neuro Dynamic Testing:    "            Sciatic nerve:                                       SLR:    R = neg                                      L = neg                                        Femoral Nerve:                          Femoral nerve test: positive R      9/12 Function:  Supine to sit: mod I with increased time and effort  Sit to supine: min A RLE        CMS Impairment/Limitation/Restriction for FOTO Hip Survey     Therapist reviewed FOTO scores for Robinson Reardon Sr. on 10/1/2019.   FOTO documents entered into EPIC - see Media section.     Limitation Score: 70% (75% on eval)     Goal: 60%            TREATMENT         Robinson received therapeutic exercises to develop flexibility and core stabilization for 60 minutes including:  Semi reclined due to COPD    **Exercises in BOLD performed today**    prone on elbows 2 min  prone knee flexion x 20  Transverse abdominus activation  2  Min ball sq   Diaphragmatic breathing x 3 min  Mini squats 15x  GSS slant  90 sec  ltr ball small oscill 2 min   Quad sets 10 x 5 sec   LAQ 15x 2#  saq 15x 2#  Heel slides with strap 15x  Bridges 15x  SL clamshells 15x  SL hip abd 15x  Quadruped cat/cow x 10    Recumbent bike x 6 min    Application of FDN: Pt educated on benefits and potential side effects of dry needling. Educated pt on benefits, precautions, side effects followign IDN. Educated pt to use heat following treatment sessions if pt is experiencing pain or soreness. Pt verbalized good understanding of education.  Pt signed written consent to dry needling Rx. Pt gave verbal consent for DN    Pt received dry needling to the below listed muscles using 60mm needles.  VMO R  Rectus femoris R  Vastus lateralis R  Addustor longus R  PVM L2-S1 onesimo - added 11/5/19  Onesimo LSP QL - added 11/5/19    Winding technique used every 5 minutes for pain reduction, decreased muscle tone, and improved overall mobility.    Manual therapy R long axis hip distraction 20 sec x 4 with relief reported. PROM R Hip flex , er  , mr  In semi reclined positon as faustino 20 sec x 3.     Modalities: 10 min x MHP to R thigh at end of session     Home Exercises and Patient Education Provided     Education provided:   - using sc on L UE to decrease gait deficits and decrease R hip inflammation    Written Home Exercises Provided: heel slides, LAQ, mini squats, clams  Exercises were reviewed and Robinson was able to demonstrate them prior to the end of the session.  Robinson demonstrated good  understanding of the education provided.      See EMR under Patient Instructions for exercises provided 9/12/2019, 9/26.     Assessment     Good tolerance to therex today. Added supine hip flexor and ITB stretches today. Poor tolerance with hip flexor stretch over EOT; limited knee flexion >30 deg with hip extended and required modification with foot propped on stool. Temporary relief with dry needling for management of bilateral low back with third session performed. No adverse effects noted.       Pt prognosis is Fair.   Pt will benefit from skilled outpatient Physical Therapy to address the deficits stated above and in the chart below, provide pt/family education, and to maximize pt's level of independence.         Pt's spiritual, cultural and educational needs considered and patient is agreeable to the plan of care and goals as stated below:      Anticipated Barriers for therapy: previous PT with no benefit seen          Goals:  Short Term Goals: 6 weeks   1. Patient to demonstrate improved lumbar extension ROM by 25% or greater for improved posture and gait-met 10/24/2019  2. Patient to report decreased pain in R thigh by 30% or greater with ADL's- progressing, not met  3. Patient to be independent with all bed mobility and transfers - met 10/24/2019     Long Term Goals: 12 weeks   1. Patient to be independent with home exercise program for improved self management of condition- progressing, not met  2. Patient to have decreased subjective report of  disability as noted by a score of 60% or less on the FOTO hip questionnaire - progressing, not met  3. Patient to increased strength in RLE's by 1/2 grade or greater for improved performance of ADL's - progressing, not met  4. Patient to increased AROM in R hip to WFL for improved performance of ADL's such as donning shoes- progressing, not met        Plan   Plan of care Certification: 9/12/2019 to 12/6/2019.     Outpatient Physical Therapy 2 times weekly for 9 weeks to include the following interventions: Cervical/Lumbar Traction, Gait Training, Manual Therapy, Moist Heat/ Ice, Neuromuscular Re-ed, Patient Education, Therapeutic Activites, Therapeutic Exercise and dry needling prn.      Pauline Nair, PT

## 2019-11-12 ENCOUNTER — CLINICAL SUPPORT (OUTPATIENT)
Dept: REHABILITATION | Facility: OTHER | Age: 59
End: 2019-11-12
Payer: MEDICAID

## 2019-11-12 DIAGNOSIS — G89.29 CHRONIC BILATERAL LOW BACK PAIN WITHOUT SCIATICA: ICD-10-CM

## 2019-11-12 DIAGNOSIS — M54.50 CHRONIC BILATERAL LOW BACK PAIN WITHOUT SCIATICA: ICD-10-CM

## 2019-11-12 PROCEDURE — 97110 THERAPEUTIC EXERCISES: CPT | Mod: PN | Performed by: PHYSICAL THERAPIST

## 2019-11-12 NOTE — PROGRESS NOTES
"[facility]  Physical Therapy treatment note      Name: Robinson Reardon Sr.  Clinic Number: [id number]     Therapy Diagnosis:        Encounter Diagnosis   Name Primary?    Chronic bilateral low back pain without sciatica        Physician: Leonor Gupta NP     Physician Orders: PT Eval and Treat   Medical Diagnosis from Referral:   E66.01 (ICD-10-CM) - Morbid (severe) obesity due to excess calories   M51.36 (ICD-10-CM) - Other intervertebral disc degeneration, lumbar region   M16.11 (ICD-10-CM) - Unilateral primary osteoarthritis, right hip   M79.601 (ICD-10-CM) - Pain in right arm      Evaluation Date: 9/12/2019  Authorization Period Expiration: 9/11/2020  Plan of Care Expiration: 12/6/2019  Visit # / Visits authorized: 10   visit date 11/12/2019    Time In:  10:00 AM  Time Out: 11:10 AM  Total Billable Time: 60  minutes     Precautions: Standard     Subjective     Reports short term relief with the dry needling, but overall no change in back or hip pain. He had to walk 6 blocks to the bus stop and his R leg kept giving out on him. He had a young son and pain is limiting his ability to be active with him.        Pain:  Current  8/10  Location: right anterior thigh and central low back pain      Pts goals: "to get rid of this" and be able to get back to his normal routine without limitations      Objective      Observation: presents to clinic with slow antalgic gait no AD  Posture:  Forward flexed trunk approximately 25 deg with B hip/ knee flexion, decreased weight bearing RLE     10/24 Lumbar Range of Motion:     percentage Pain   Flexion 60/20: 40    painful         Extension -15/0:0 deg    Painful R anterior thigh         Left Side Bending 15 painful         Right Side Bending 15 painful         Left rotation    40% painful         Right Rotation    40% painful            R hip ROM:    Flexion PROM: R: L: deg     11/12/19 Lower Extremity Strength  Right LE   Left LE     Knee extension: " 4/5 Knee extension: 5/5   Knee flexion: 4+/5 Knee flexion: 5/5   Hip flexion: 3-/5 Hip flexion: 3+/5   Ankle dorsiflexion: 5/5 Ankle dorsiflexion: 5/5            9/12 Neuro Dynamic Testing:               Sciatic nerve:                                       SLR:    R = neg                                      L = neg                                        Femoral Nerve:                          Femoral nerve test: positive R      9/12 Function:  Supine to sit: mod I with increased time and effort  Sit to supine: min A RLE        CMS Impairment/Limitation/Restriction for FOTO Hip Survey     Therapist reviewed FOTO scores for Robinson Reardon Sr. on 11/12/2019.   FOTO documents entered into [employer] - see Media section.     Limitation Score: 42% (70% 5th visit, 75% on eval)     Goal: 60%            TREATMENT         Robinson received therapeutic exercises to develop flexibility and core stabilization for 40 minutes including:  Semi reclined due to COPD    **Exercises in BOLD performed today**    prone on elbows 2 min  prone knee flexion x 20  Transverse abdominus activation  2  Min ball sq   Diaphragmatic breathing x 3 min  Mini squats 15x  GSS slant  90 sec  ltr ball small oscill 2 min   Quad sets 10 x 5 sec   LAQ 15x 2#  saq 15x 2#  Heel slides with strap 15x  Bridges 15x  SL clamshells 15x  SL hip abd 15x  Quadruped cat/cow x 10    Recumbent bike x 6 min    Application of FDN: Pt educated on benefits and potential side effects of dry needling. Educated pt on benefits, precautions, side effects followign IDN. Educated pt to use heat following treatment sessions if pt is experiencing pain or soreness. Pt verbalized good understanding of education.  Pt signed written consent to dry needling Rx. Pt gave verbal consent for DN    Pt received dry needling to the below listed muscles using 60mm, 75mm needles.  Multifidus L4-5  Onesimo LSP QL    Winding technique used every 5 minutes for pain reduction, decreased muscle tone, and  improved overall mobility.    Manual therapy R long axis hip distraction 20 sec x 4 with relief reported. PROM R Hip flex , er , mr  In semi reclined positon as faustino 20 sec x 3.     Modalities: 10 min x MHP to R thigh at end of session     Home Exercises and Patient Education Provided     Education provided:   - using sc on L UE to decrease gait deficits and decrease R hip inflammation    Written Home Exercises Provided: heel slides, LAQ, mini squats, clams  Exercises were reviewed and Robinson was able to demonstrate them prior to the end of the session.  Robinson demonstrated good  understanding of the education provided.      See EMR under Patient Instructions for exercises provided 9/12/2019, 9/26.     Assessment     Patient has been seen 10 visits since initial eval on 9/12/2019 for DDD lumbar spine and R hip OA. Pt has made some progress with lumbar extension and subjective report of disability. Pt with no change in overall pain and continues with significant limitations in R hip ROM and strength. Pt has maximized benefit from PT at this time and referred back to MD. Goals partially met.      Goals:  Short Term Goals: 6 weeks   1. Patient to demonstrate improved lumbar extension ROM by 25% or greater for improved posture and gait-met 10/24/2019  2. Patient to report decreased pain in R thigh by 30% or greater with ADL's- not met  3. Patient to be independent with all bed mobility and transfers - met 10/24/2019     Long Term Goals: 12 weeks   1. Patient to be independent with home exercise program for improved self management of condition- 11/12/2019 met  2. Patient to have decreased subjective report of disability as noted by a score of 60% or less on the FOTO hip questionnaire - met 11/12/2019  3. Patient to increased strength in RLE's by 1/2 grade or greater for improved performance of ADL's - not met  4. Patient to increased AROM in R hip to WFL for improved performance of ADL's such as donning shoes- not  met        Plan     Pt is discharged from outpatient physical therapy. Referred back to MD for no improvement in symptoms. Pt may benefit from referral to orthopedics for R hip pain.      Emily Redmond, PT

## 2020-10-21 DIAGNOSIS — M16.11 PRIMARY OSTEOARTHRITIS OF RIGHT HIP: Primary | ICD-10-CM

## 2020-10-21 DIAGNOSIS — M54.9 BACK PAIN: ICD-10-CM

## 2020-12-04 ENCOUNTER — CLINICAL SUPPORT (OUTPATIENT)
Dept: REHABILITATION | Facility: OTHER | Age: 60
End: 2020-12-04
Payer: MEDICAID

## 2020-12-04 DIAGNOSIS — R53.1 DECREASED STRENGTH: ICD-10-CM

## 2020-12-04 DIAGNOSIS — G89.29 CHRONIC BILATERAL LOW BACK PAIN WITHOUT SCIATICA: ICD-10-CM

## 2020-12-04 DIAGNOSIS — M54.50 CHRONIC BILATERAL LOW BACK PAIN WITHOUT SCIATICA: ICD-10-CM

## 2020-12-04 DIAGNOSIS — M25.551 RIGHT HIP PAIN: ICD-10-CM

## 2020-12-04 DIAGNOSIS — Z74.09 IMPAIRED FUNCTIONAL MOBILITY, BALANCE, GAIT, AND ENDURANCE: ICD-10-CM

## 2020-12-04 PROCEDURE — 97110 THERAPEUTIC EXERCISES: CPT | Mod: PN

## 2020-12-04 PROCEDURE — 97162 PT EVAL MOD COMPLEX 30 MIN: CPT | Mod: PN
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
## 2020-12-10 ENCOUNTER — DOCUMENTATION ONLY (OUTPATIENT)
Dept: REHABILITATION | Facility: OTHER | Age: 60
End: 2020-12-10

## 2021-01-19 ENCOUNTER — DOCUMENTATION ONLY (OUTPATIENT)
Dept: REHABILITATION | Facility: OTHER | Age: 61
End: 2021-01-19

## 2021-01-19 DIAGNOSIS — R53.1 DECREASED STRENGTH: ICD-10-CM

## 2021-01-19 DIAGNOSIS — G89.29 CHRONIC BILATERAL LOW BACK PAIN WITHOUT SCIATICA: Primary | ICD-10-CM

## 2021-01-19 DIAGNOSIS — Z74.09 IMPAIRED FUNCTIONAL MOBILITY, BALANCE, GAIT, AND ENDURANCE: ICD-10-CM

## 2021-01-19 DIAGNOSIS — M25.551 RIGHT HIP PAIN: ICD-10-CM

## 2021-01-19 DIAGNOSIS — M54.50 CHRONIC BILATERAL LOW BACK PAIN WITHOUT SCIATICA: Primary | ICD-10-CM

## 2021-10-06 DIAGNOSIS — M16.11 OSTEOARTHRITIS OF RIGHT HIP: Primary | ICD-10-CM

## 2021-10-18 ENCOUNTER — LAB VISIT (OUTPATIENT)
Dept: LAB | Facility: OTHER | Age: 61
End: 2021-10-18
Payer: MEDICAID

## 2021-10-18 ENCOUNTER — OFFICE VISIT (OUTPATIENT)
Dept: UROLOGY | Facility: CLINIC | Age: 61
End: 2021-10-18
Payer: MEDICAID

## 2021-10-18 ENCOUNTER — OFFICE VISIT (OUTPATIENT)
Dept: SURGERY | Facility: CLINIC | Age: 61
End: 2021-10-18
Attending: SPECIALIST
Payer: MEDICAID

## 2021-10-18 VITALS
BODY MASS INDEX: 35.84 KG/M2 | HEART RATE: 71 BPM | HEIGHT: 71 IN | WEIGHT: 256 LBS | SYSTOLIC BLOOD PRESSURE: 106 MMHG | DIASTOLIC BLOOD PRESSURE: 71 MMHG

## 2021-10-18 VITALS
BODY MASS INDEX: 35.84 KG/M2 | HEART RATE: 70 BPM | SYSTOLIC BLOOD PRESSURE: 109 MMHG | OXYGEN SATURATION: 95 % | WEIGHT: 256 LBS | HEIGHT: 71 IN | DIASTOLIC BLOOD PRESSURE: 66 MMHG

## 2021-10-18 DIAGNOSIS — R35.1 NOCTURIA: ICD-10-CM

## 2021-10-18 DIAGNOSIS — Z20.2 POTENTIAL EXPOSURE TO STD: ICD-10-CM

## 2021-10-18 DIAGNOSIS — K80.20 CALCULUS OF GALLBLADDER WITHOUT CHOLECYSTITIS WITHOUT OBSTRUCTION: Primary | ICD-10-CM

## 2021-10-18 DIAGNOSIS — Z20.2 POTENTIAL EXPOSURE TO STD: Primary | ICD-10-CM

## 2021-10-18 PROCEDURE — 99203 OFFICE O/P NEW LOW 30 MIN: CPT | Mod: S$GLB,,, | Performed by: NURSE PRACTITIONER

## 2021-10-18 PROCEDURE — 99204 OFFICE O/P NEW MOD 45 MIN: CPT | Mod: S$PBB,,, | Performed by: SPECIALIST

## 2021-10-18 PROCEDURE — 87491 CHLMYD TRACH DNA AMP PROBE: CPT | Performed by: NURSE PRACTITIONER

## 2021-10-18 PROCEDURE — 87086 URINE CULTURE/COLONY COUNT: CPT | Performed by: NURSE PRACTITIONER

## 2021-10-18 PROCEDURE — 99999 PR PBB SHADOW E&M-EST. PATIENT-LVL III: ICD-10-PCS | Mod: PBBFAC,,, | Performed by: SPECIALIST

## 2021-10-18 PROCEDURE — 99999 PR PBB SHADOW E&M-EST. PATIENT-LVL III: CPT | Mod: PBBFAC,,, | Performed by: SPECIALIST

## 2021-10-18 PROCEDURE — 99203 PR OFFICE/OUTPT VISIT, NEW, LEVL III, 30-44 MIN: ICD-10-PCS | Mod: S$GLB,,, | Performed by: NURSE PRACTITIONER

## 2021-10-18 PROCEDURE — 99213 OFFICE O/P EST LOW 20 MIN: CPT | Mod: PBBFAC | Performed by: SPECIALIST

## 2021-10-18 PROCEDURE — 99204 PR OFFICE/OUTPT VISIT, NEW, LEVL IV, 45-59 MIN: ICD-10-PCS | Mod: S$PBB,,, | Performed by: SPECIALIST

## 2021-10-18 PROCEDURE — 87591 N.GONORRHOEAE DNA AMP PROB: CPT | Performed by: NURSE PRACTITIONER

## 2021-10-18 RX ORDER — FAMOTIDINE 20 MG/1
20 TABLET, FILM COATED ORAL 2 TIMES DAILY
COMMUNITY

## 2021-10-18 RX ORDER — SILDENAFIL 50 MG/1
50 TABLET, FILM COATED ORAL DAILY PRN
COMMUNITY

## 2021-10-18 RX ORDER — NIFEDIPINE 90 MG/1
30 TABLET, FILM COATED, EXTENDED RELEASE ORAL DAILY
COMMUNITY

## 2021-10-18 RX ORDER — ROSUVASTATIN CALCIUM 40 MG/1
10 TABLET, COATED ORAL NIGHTLY
COMMUNITY

## 2021-10-18 RX ORDER — LOSARTAN POTASSIUM 25 MG/1
25 TABLET ORAL DAILY
COMMUNITY

## 2021-10-19 LAB
BACTERIA UR CULT: NO GROWTH
C TRACH DNA SPEC QL NAA+PROBE: NOT DETECTED
N GONORRHOEA DNA SPEC QL NAA+PROBE: NOT DETECTED

## 2021-10-20 DIAGNOSIS — M16.11 PRIMARY OSTEOARTHRITIS OF RIGHT HIP: Primary | ICD-10-CM

## 2021-10-27 ENCOUNTER — ANESTHESIA EVENT (OUTPATIENT)
Dept: SURGERY | Facility: OTHER | Age: 61
End: 2021-10-27
Payer: MEDICAID

## 2021-10-27 ENCOUNTER — HOSPITAL ENCOUNTER (OUTPATIENT)
Dept: PREADMISSION TESTING | Facility: OTHER | Age: 61
Discharge: HOME | End: 2021-10-27
Attending: SPECIALIST
Payer: MEDICAID

## 2021-10-27 VITALS
DIASTOLIC BLOOD PRESSURE: 67 MMHG | SYSTOLIC BLOOD PRESSURE: 101 MMHG | BODY MASS INDEX: 33.46 KG/M2 | RESPIRATION RATE: 16 BRPM | OXYGEN SATURATION: 93 % | HEART RATE: 73 BPM | WEIGHT: 239 LBS | HEIGHT: 71 IN | TEMPERATURE: 98 F

## 2021-10-27 DIAGNOSIS — Z01.818 PREOP TESTING: Primary | ICD-10-CM

## 2021-10-27 LAB
ANION GAP SERPL CALC-SCNC: 8 MMOL/L (ref 8–16)
BASOPHILS # BLD AUTO: 0.06 K/UL (ref 0–0.2)
BASOPHILS NFR BLD: 0.9 % (ref 0–1.9)
BUN SERPL-MCNC: 14 MG/DL (ref 8–23)
CALCIUM SERPL-MCNC: 9.7 MG/DL (ref 8.7–10.5)
CHLORIDE SERPL-SCNC: 106 MMOL/L (ref 95–110)
CO2 SERPL-SCNC: 26 MMOL/L (ref 23–29)
CREAT SERPL-MCNC: 1.2 MG/DL (ref 0.5–1.4)
DIFFERENTIAL METHOD: ABNORMAL
EOSINOPHIL # BLD AUTO: 0.2 K/UL (ref 0–0.5)
EOSINOPHIL NFR BLD: 2.8 % (ref 0–8)
ERYTHROCYTE [DISTWIDTH] IN BLOOD BY AUTOMATED COUNT: 14 % (ref 11.5–14.5)
EST. GFR  (AFRICAN AMERICAN): >60 ML/MIN/1.73 M^2
EST. GFR  (NON AFRICAN AMERICAN): >60 ML/MIN/1.73 M^2
GLUCOSE SERPL-MCNC: 90 MG/DL (ref 70–110)
HCT VFR BLD AUTO: 43.3 % (ref 40–54)
HGB BLD-MCNC: 13.7 G/DL (ref 14–18)
IMM GRANULOCYTES # BLD AUTO: 0.02 K/UL (ref 0–0.04)
IMM GRANULOCYTES NFR BLD AUTO: 0.3 % (ref 0–0.5)
LYMPHOCYTES # BLD AUTO: 1.5 K/UL (ref 1–4.8)
LYMPHOCYTES NFR BLD: 21.8 % (ref 18–48)
MCH RBC QN AUTO: 28.4 PG (ref 27–31)
MCHC RBC AUTO-ENTMCNC: 31.6 G/DL (ref 32–36)
MCV RBC AUTO: 90 FL (ref 82–98)
MONOCYTES # BLD AUTO: 0.6 K/UL (ref 0.3–1)
MONOCYTES NFR BLD: 9.1 % (ref 4–15)
NEUTROPHILS # BLD AUTO: 4.5 K/UL (ref 1.8–7.7)
NEUTROPHILS NFR BLD: 65.1 % (ref 38–73)
NRBC BLD-RTO: 0 /100 WBC
PLATELET # BLD AUTO: 210 K/UL (ref 150–450)
PMV BLD AUTO: 10.5 FL (ref 9.2–12.9)
POTASSIUM SERPL-SCNC: 4.3 MMOL/L (ref 3.5–5.1)
RBC # BLD AUTO: 4.83 M/UL (ref 4.6–6.2)
SODIUM SERPL-SCNC: 140 MMOL/L (ref 136–145)
WBC # BLD AUTO: 6.89 K/UL (ref 3.9–12.7)

## 2021-10-27 PROCEDURE — 93005 ELECTROCARDIOGRAM TRACING: CPT

## 2021-10-27 PROCEDURE — 36415 COLL VENOUS BLD VENIPUNCTURE: CPT | Performed by: ANESTHESIOLOGY

## 2021-10-27 PROCEDURE — 85025 COMPLETE CBC W/AUTO DIFF WBC: CPT | Performed by: ANESTHESIOLOGY

## 2021-10-27 PROCEDURE — 93010 ELECTROCARDIOGRAM REPORT: CPT | Mod: ,,, | Performed by: INTERNAL MEDICINE

## 2021-10-27 PROCEDURE — 93010 EKG 12-LEAD: ICD-10-PCS | Mod: ,,, | Performed by: INTERNAL MEDICINE

## 2021-10-27 PROCEDURE — 80048 BASIC METABOLIC PNL TOTAL CA: CPT | Performed by: ANESTHESIOLOGY

## 2021-10-27 RX ORDER — ALBUTEROL SULFATE 2.5 MG/.5ML
2.5 SOLUTION RESPIRATORY (INHALATION)
Status: CANCELLED | OUTPATIENT
Start: 2021-10-27 | End: 2021-10-27

## 2021-10-27 RX ORDER — LIDOCAINE HYDROCHLORIDE 10 MG/ML
0.5 INJECTION, SOLUTION EPIDURAL; INFILTRATION; INTRACAUDAL; PERINEURAL ONCE
Status: CANCELLED | OUTPATIENT
Start: 2021-10-27 | End: 2021-10-27

## 2021-10-27 RX ORDER — FAMOTIDINE 20 MG/1
20 TABLET, FILM COATED ORAL
Status: CANCELLED | OUTPATIENT
Start: 2021-10-27 | End: 2021-10-27

## 2021-10-27 RX ORDER — ACETAMINOPHEN 500 MG
1000 TABLET ORAL
Status: CANCELLED | OUTPATIENT
Start: 2021-10-27 | End: 2021-10-27

## 2021-10-27 RX ORDER — SODIUM CHLORIDE, SODIUM LACTATE, POTASSIUM CHLORIDE, CALCIUM CHLORIDE 600; 310; 30; 20 MG/100ML; MG/100ML; MG/100ML; MG/100ML
INJECTION, SOLUTION INTRAVENOUS CONTINUOUS
Status: CANCELLED | OUTPATIENT
Start: 2021-10-27

## 2021-10-27 RX ORDER — BUDESONIDE AND FORMOTEROL FUMARATE DIHYDRATE 80; 4.5 UG/1; UG/1
2 AEROSOL RESPIRATORY (INHALATION)
COMMUNITY

## 2021-11-05 ENCOUNTER — ANESTHESIA (OUTPATIENT)
Dept: SURGERY | Facility: OTHER | Age: 61
End: 2021-11-05
Payer: MEDICAID

## 2021-11-05 ENCOUNTER — HOSPITAL ENCOUNTER (OUTPATIENT)
Facility: OTHER | Age: 61
Discharge: HOME | End: 2021-11-05
Attending: SPECIALIST | Admitting: SPECIALIST
Payer: MEDICAID

## 2021-11-05 DIAGNOSIS — K82.9 GALLBLADDER ATTACK: Primary | ICD-10-CM

## 2021-11-05 PROBLEM — K80.11 CALCULUS OF GALLBLADDER WITH CHOLECYSTITIS WITH BILIARY OBSTRUCTION: Status: ACTIVE | Noted: 2021-11-05

## 2021-11-05 PROCEDURE — 25000003 PHARM REV CODE 250: Performed by: ANESTHESIOLOGY

## 2021-11-05 PROCEDURE — 88313 PR  SPECIAL STAINS,GROUP II: ICD-10-PCS | Mod: 26,,, | Performed by: PATHOLOGY

## 2021-11-05 PROCEDURE — 47379 UNLISTED LAPS PX LIVER: CPT | Mod: ,,, | Performed by: SPECIALIST

## 2021-11-05 PROCEDURE — 37000009 HC ANESTHESIA EA ADD 15 MINS: Performed by: SPECIALIST

## 2021-11-05 PROCEDURE — C1729 CATH, DRAINAGE: HCPCS | Performed by: SPECIALIST

## 2021-11-05 PROCEDURE — 88304 TISSUE EXAM BY PATHOLOGIST: CPT | Performed by: PATHOLOGY

## 2021-11-05 PROCEDURE — 63600175 PHARM REV CODE 636 W HCPCS: Performed by: SPECIALIST

## 2021-11-05 PROCEDURE — 88313 SPECIAL STAINS GROUP 2: CPT | Performed by: PATHOLOGY

## 2021-11-05 PROCEDURE — 88304 PR  SURG PATH,LEVEL III: ICD-10-PCS | Mod: 26,,, | Performed by: PATHOLOGY

## 2021-11-05 PROCEDURE — 00790 ANES IPER UPR ABD NOS: CPT | Performed by: SPECIALIST

## 2021-11-05 PROCEDURE — 94761 N-INVAS EAR/PLS OXIMETRY MLT: CPT

## 2021-11-05 PROCEDURE — 63600175 PHARM REV CODE 636 W HCPCS: Performed by: NURSE ANESTHETIST, CERTIFIED REGISTERED

## 2021-11-05 PROCEDURE — 47379 PR LAPAROSCOPIC WEDGE LIVER BIOPSY: ICD-10-PCS | Mod: ,,, | Performed by: SPECIALIST

## 2021-11-05 PROCEDURE — 71000033 HC RECOVERY, INTIAL HOUR: Performed by: SPECIALIST

## 2021-11-05 PROCEDURE — 88313 SPECIAL STAINS GROUP 2: CPT | Mod: 26,,, | Performed by: PATHOLOGY

## 2021-11-05 PROCEDURE — 63600175 PHARM REV CODE 636 W HCPCS: Performed by: ANESTHESIOLOGY

## 2021-11-05 PROCEDURE — 94640 AIRWAY INHALATION TREATMENT: CPT

## 2021-11-05 PROCEDURE — 71000039 HC RECOVERY, EACH ADD'L HOUR: Performed by: SPECIALIST

## 2021-11-05 PROCEDURE — 25000242 PHARM REV CODE 250 ALT 637 W/ HCPCS: Performed by: ANESTHESIOLOGY

## 2021-11-05 PROCEDURE — 36000708 HC OR TIME LEV III 1ST 15 MIN: Performed by: SPECIALIST

## 2021-11-05 PROCEDURE — 88307 PR  SURG PATH,LEVEL V: ICD-10-PCS | Mod: 26,,, | Performed by: PATHOLOGY

## 2021-11-05 PROCEDURE — 71000015 HC POSTOP RECOV 1ST HR: Performed by: SPECIALIST

## 2021-11-05 PROCEDURE — 27201423 OPTIME MED/SURG SUP & DEVICES STERILE SUPPLY: Performed by: SPECIALIST

## 2021-11-05 PROCEDURE — 88307 TISSUE EXAM BY PATHOLOGIST: CPT | Performed by: PATHOLOGY

## 2021-11-05 PROCEDURE — 25000003 PHARM REV CODE 250: Performed by: NURSE ANESTHETIST, CERTIFIED REGISTERED

## 2021-11-05 PROCEDURE — 25000003 PHARM REV CODE 250: Performed by: SPECIALIST

## 2021-11-05 PROCEDURE — 88307 TISSUE EXAM BY PATHOLOGIST: CPT | Mod: 26,,, | Performed by: PATHOLOGY

## 2021-11-05 PROCEDURE — 88304 TISSUE EXAM BY PATHOLOGIST: CPT | Mod: 26,,, | Performed by: PATHOLOGY

## 2021-11-05 PROCEDURE — 36000709 HC OR TIME LEV III EA ADD 15 MIN: Performed by: SPECIALIST

## 2021-11-05 PROCEDURE — 47562 LAPAROSCOPIC CHOLECYSTECTOMY: CPT | Mod: ,,, | Performed by: SPECIALIST

## 2021-11-05 PROCEDURE — 47562 PR LAP,CHOLECYSTECTOMY: ICD-10-PCS | Mod: ,,, | Performed by: SPECIALIST

## 2021-11-05 PROCEDURE — 37000008 HC ANESTHESIA 1ST 15 MINUTES: Performed by: SPECIALIST

## 2021-11-05 RX ORDER — OXYCODONE HYDROCHLORIDE 5 MG/1
5 TABLET ORAL
Status: DISCONTINUED | OUTPATIENT
Start: 2021-11-05 | End: 2021-11-05 | Stop reason: HOSPADM

## 2021-11-05 RX ORDER — ACETAMINOPHEN 500 MG
1000 TABLET ORAL
Status: COMPLETED | OUTPATIENT
Start: 2021-11-05 | End: 2021-11-05

## 2021-11-05 RX ORDER — SODIUM CHLORIDE, SODIUM LACTATE, POTASSIUM CHLORIDE, CALCIUM CHLORIDE 600; 310; 30; 20 MG/100ML; MG/100ML; MG/100ML; MG/100ML
INJECTION, SOLUTION INTRAVENOUS CONTINUOUS
Status: DISCONTINUED | OUTPATIENT
Start: 2021-11-05 | End: 2021-11-05 | Stop reason: HOSPADM

## 2021-11-05 RX ORDER — DIPHENHYDRAMINE HYDROCHLORIDE 50 MG/ML
12.5 INJECTION INTRAMUSCULAR; INTRAVENOUS EVERY 30 MIN PRN
Status: DISCONTINUED | OUTPATIENT
Start: 2021-11-05 | End: 2021-11-05 | Stop reason: HOSPADM

## 2021-11-05 RX ORDER — SODIUM CHLORIDE 9 MG/ML
INJECTION, SOLUTION INTRAVENOUS CONTINUOUS
Status: DISCONTINUED | OUTPATIENT
Start: 2021-11-05 | End: 2021-11-05 | Stop reason: HOSPADM

## 2021-11-05 RX ORDER — PROPOFOL 10 MG/ML
VIAL (ML) INTRAVENOUS
Status: DISCONTINUED | OUTPATIENT
Start: 2021-11-05 | End: 2021-11-05

## 2021-11-05 RX ORDER — EPINEPHRINE 1 MG/ML
INJECTION, SOLUTION INTRACARDIAC; INTRAMUSCULAR; INTRAVENOUS; SUBCUTANEOUS
Status: DISCONTINUED | OUTPATIENT
Start: 2021-11-05 | End: 2021-11-05 | Stop reason: HOSPADM

## 2021-11-05 RX ORDER — MIDAZOLAM HYDROCHLORIDE 1 MG/ML
INJECTION INTRAMUSCULAR; INTRAVENOUS
Status: DISCONTINUED | OUTPATIENT
Start: 2021-11-05 | End: 2021-11-05

## 2021-11-05 RX ORDER — ACETAMINOPHEN 325 MG/1
650 TABLET ORAL EVERY 4 HOURS PRN
Status: DISCONTINUED | OUTPATIENT
Start: 2021-11-05 | End: 2021-11-05 | Stop reason: HOSPADM

## 2021-11-05 RX ORDER — FAMOTIDINE 20 MG/1
20 TABLET, FILM COATED ORAL
Status: COMPLETED | OUTPATIENT
Start: 2021-11-05 | End: 2021-11-05

## 2021-11-05 RX ORDER — LIDOCAINE HYDROCHLORIDE 10 MG/ML
0.5 INJECTION, SOLUTION EPIDURAL; INFILTRATION; INTRACAUDAL; PERINEURAL ONCE
Status: DISCONTINUED | OUTPATIENT
Start: 2021-11-05 | End: 2021-11-05 | Stop reason: HOSPADM

## 2021-11-05 RX ORDER — PROCHLORPERAZINE EDISYLATE 5 MG/ML
5 INJECTION INTRAMUSCULAR; INTRAVENOUS EVERY 30 MIN PRN
Status: DISCONTINUED | OUTPATIENT
Start: 2021-11-05 | End: 2021-11-05 | Stop reason: HOSPADM

## 2021-11-05 RX ORDER — ONDANSETRON 8 MG/1
8 TABLET, ORALLY DISINTEGRATING ORAL EVERY 8 HOURS PRN
Status: DISCONTINUED | OUTPATIENT
Start: 2021-11-05 | End: 2021-11-05 | Stop reason: HOSPADM

## 2021-11-05 RX ORDER — FENTANYL CITRATE 50 UG/ML
INJECTION, SOLUTION INTRAMUSCULAR; INTRAVENOUS
Status: DISCONTINUED | OUTPATIENT
Start: 2021-11-05 | End: 2021-11-05

## 2021-11-05 RX ORDER — OXYCODONE AND ACETAMINOPHEN 7.5; 325 MG/1; MG/1
1 TABLET ORAL EVERY 6 HOURS PRN
Qty: 28 TABLET | Refills: 0 | Status: SHIPPED | OUTPATIENT
Start: 2021-11-05

## 2021-11-05 RX ORDER — ONDANSETRON 2 MG/ML
INJECTION INTRAMUSCULAR; INTRAVENOUS
Status: DISCONTINUED | OUTPATIENT
Start: 2021-11-05 | End: 2021-11-05

## 2021-11-05 RX ORDER — LIDOCAINE HYDROCHLORIDE 10 MG/ML
1 INJECTION, SOLUTION EPIDURAL; INFILTRATION; INTRACAUDAL; PERINEURAL ONCE
Status: DISCONTINUED | OUTPATIENT
Start: 2021-11-05 | End: 2021-11-05 | Stop reason: HOSPADM

## 2021-11-05 RX ORDER — ALBUTEROL SULFATE 2.5 MG/.5ML
2.5 SOLUTION RESPIRATORY (INHALATION)
Status: COMPLETED | OUTPATIENT
Start: 2021-11-05 | End: 2021-11-05

## 2021-11-05 RX ORDER — CEFAZOLIN SODIUM 2 G/50ML
2 SOLUTION INTRAVENOUS
Status: COMPLETED | OUTPATIENT
Start: 2021-11-05 | End: 2021-11-05

## 2021-11-05 RX ORDER — SODIUM CHLORIDE 0.9 % (FLUSH) 0.9 %
3 SYRINGE (ML) INJECTION
Status: DISCONTINUED | OUTPATIENT
Start: 2021-11-05 | End: 2021-11-05 | Stop reason: HOSPADM

## 2021-11-05 RX ORDER — BUPIVACAINE HYDROCHLORIDE 2.5 MG/ML
INJECTION, SOLUTION EPIDURAL; INFILTRATION; INTRACAUDAL
Status: DISCONTINUED | OUTPATIENT
Start: 2021-11-05 | End: 2021-11-05 | Stop reason: HOSPADM

## 2021-11-05 RX ORDER — ROCURONIUM BROMIDE 10 MG/ML
INJECTION, SOLUTION INTRAVENOUS
Status: DISCONTINUED | OUTPATIENT
Start: 2021-11-05 | End: 2021-11-05

## 2021-11-05 RX ORDER — OXYCODONE HYDROCHLORIDE 5 MG/1
5 TABLET ORAL EVERY 4 HOURS PRN
Status: DISCONTINUED | OUTPATIENT
Start: 2021-11-05 | End: 2021-11-05 | Stop reason: HOSPADM

## 2021-11-05 RX ORDER — DEXAMETHASONE SODIUM PHOSPHATE 4 MG/ML
INJECTION, SOLUTION INTRA-ARTICULAR; INTRALESIONAL; INTRAMUSCULAR; INTRAVENOUS; SOFT TISSUE
Status: DISCONTINUED | OUTPATIENT
Start: 2021-11-05 | End: 2021-11-05

## 2021-11-05 RX ORDER — HYDROMORPHONE HYDROCHLORIDE 2 MG/ML
0.4 INJECTION, SOLUTION INTRAMUSCULAR; INTRAVENOUS; SUBCUTANEOUS EVERY 5 MIN PRN
Status: DISCONTINUED | OUTPATIENT
Start: 2021-11-05 | End: 2021-11-05 | Stop reason: HOSPADM

## 2021-11-05 RX ORDER — LIDOCAINE HCL/PF 100 MG/5ML
SYRINGE (ML) INTRAVENOUS
Status: DISCONTINUED | OUTPATIENT
Start: 2021-11-05 | End: 2021-11-05

## 2021-11-05 RX ORDER — OXYCODONE HYDROCHLORIDE 5 MG/1
10 TABLET ORAL EVERY 4 HOURS PRN
Status: DISCONTINUED | OUTPATIENT
Start: 2021-11-05 | End: 2021-11-05 | Stop reason: HOSPADM

## 2021-11-05 RX ADMIN — ACETAMINOPHEN 1000 MG: 500 TABLET, FILM COATED ORAL at 05:11

## 2021-11-05 RX ADMIN — FENTANYL CITRATE 100 MCG: 50 INJECTION, SOLUTION INTRAMUSCULAR; INTRAVENOUS at 07:11

## 2021-11-05 RX ADMIN — HYDROMORPHONE HYDROCHLORIDE 0.4 MG: 2 INJECTION INTRAMUSCULAR; INTRAVENOUS; SUBCUTANEOUS at 09:11

## 2021-11-05 RX ADMIN — FAMOTIDINE 20 MG: 20 TABLET ORAL at 05:11

## 2021-11-05 RX ADMIN — SODIUM CHLORIDE, SODIUM LACTATE, POTASSIUM CHLORIDE, AND CALCIUM CHLORIDE: 600; 310; 30; 20 INJECTION, SOLUTION INTRAVENOUS at 06:11

## 2021-11-05 RX ADMIN — LIDOCAINE HYDROCHLORIDE 100 MG: 20 INJECTION, SOLUTION INTRAVENOUS at 07:11

## 2021-11-05 RX ADMIN — SUGAMMADEX 200 MG: 100 INJECTION, SOLUTION INTRAVENOUS at 09:11

## 2021-11-05 RX ADMIN — DEXAMETHASONE SODIUM PHOSPHATE 4 MG: 4 INJECTION, SOLUTION INTRAMUSCULAR; INTRAVENOUS at 07:11

## 2021-11-05 RX ADMIN — ALBUTEROL SULFATE 2.5 MG: 2.5 SOLUTION RESPIRATORY (INHALATION) at 05:11

## 2021-11-05 RX ADMIN — ONDANSETRON 8 MG: 8 TABLET, ORALLY DISINTEGRATING ORAL at 11:11

## 2021-11-05 RX ADMIN — CEFAZOLIN SODIUM 2 G: 2 SOLUTION INTRAVENOUS at 07:11

## 2021-11-05 RX ADMIN — MIDAZOLAM HYDROCHLORIDE 2 MG: 1 INJECTION, SOLUTION INTRAMUSCULAR; INTRAVENOUS at 07:11

## 2021-11-05 RX ADMIN — OXYCODONE 5 MG: 5 TABLET ORAL at 10:11

## 2021-11-05 RX ADMIN — ONDANSETRON HYDROCHLORIDE 4 MG: 2 INJECTION INTRAMUSCULAR; INTRAVENOUS at 08:11

## 2021-11-05 RX ADMIN — ROCURONIUM BROMIDE 50 MG: 10 INJECTION, SOLUTION INTRAVENOUS at 07:11

## 2021-11-05 RX ADMIN — PROPOFOL 200 MG: 10 INJECTION, EMULSION INTRAVENOUS at 07:11

## 2021-11-07 VITALS
DIASTOLIC BLOOD PRESSURE: 80 MMHG | TEMPERATURE: 98 F | RESPIRATION RATE: 18 BRPM | WEIGHT: 239 LBS | SYSTOLIC BLOOD PRESSURE: 131 MMHG | HEART RATE: 74 BPM | HEIGHT: 71 IN | BODY MASS INDEX: 33.46 KG/M2 | OXYGEN SATURATION: 95 %

## 2021-11-16 LAB
FINAL PATHOLOGIC DIAGNOSIS: NORMAL
GROSS: NORMAL
Lab: NORMAL

## 2021-11-22 ENCOUNTER — OFFICE VISIT (OUTPATIENT)
Dept: SURGERY | Facility: CLINIC | Age: 61
End: 2021-11-22
Attending: SPECIALIST
Payer: MEDICAID

## 2021-11-22 VITALS
DIASTOLIC BLOOD PRESSURE: 70 MMHG | SYSTOLIC BLOOD PRESSURE: 102 MMHG | WEIGHT: 230.81 LBS | HEART RATE: 71 BPM | BODY MASS INDEX: 32.31 KG/M2 | HEIGHT: 71 IN | OXYGEN SATURATION: 97 %

## 2021-11-22 DIAGNOSIS — K82.9 GALLBLADDER ATTACK: Primary | ICD-10-CM

## 2021-11-22 PROCEDURE — 99999 PR PBB SHADOW E&M-EST. PATIENT-LVL IV: CPT | Mod: PBBFAC,,, | Performed by: SPECIALIST

## 2021-11-22 PROCEDURE — 99999 PR PBB SHADOW E&M-EST. PATIENT-LVL IV: ICD-10-PCS | Mod: PBBFAC,,, | Performed by: SPECIALIST

## 2021-11-22 PROCEDURE — 99024 POSTOP FOLLOW-UP VISIT: CPT | Mod: ,,, | Performed by: SPECIALIST

## 2021-11-22 PROCEDURE — 99024 PR POST-OP FOLLOW-UP VISIT: ICD-10-PCS | Mod: ,,, | Performed by: SPECIALIST

## 2021-11-22 PROCEDURE — 99214 OFFICE O/P EST MOD 30 MIN: CPT | Mod: PBBFAC | Performed by: SPECIALIST

## 2021-11-22 RX ORDER — TRAMADOL HYDROCHLORIDE 50 MG/1
50 TABLET ORAL EVERY 6 HOURS PRN
Qty: 20 TABLET | Refills: 0 | Status: SHIPPED | OUTPATIENT
Start: 2021-11-22 | End: 2021-12-13 | Stop reason: SDUPTHER

## 2021-12-13 RX ORDER — TRAMADOL HYDROCHLORIDE 50 MG/1
50 TABLET ORAL EVERY 6 HOURS PRN
Qty: 20 TABLET | Refills: 0 | Status: SHIPPED | OUTPATIENT
Start: 2021-12-13

## 2021-12-27 DIAGNOSIS — R14.2 BELCHING: Primary | ICD-10-CM

## 2021-12-27 RX ORDER — PANTOPRAZOLE SODIUM 20 MG/1
20 TABLET, DELAYED RELEASE ORAL DAILY
Qty: 30 TABLET | Refills: 1 | Status: SHIPPED | OUTPATIENT
Start: 2021-12-27 | End: 2024-08-07

## 2022-01-26 ENCOUNTER — PATIENT MESSAGE (OUTPATIENT)
Dept: ADMINISTRATIVE | Facility: OTHER | Age: 62
End: 2022-01-26
Payer: MEDICAID

## 2022-02-11 ENCOUNTER — TELEPHONE (OUTPATIENT)
Dept: TRANSPLANT | Facility: CLINIC | Age: 62
End: 2022-02-11
Payer: MEDICAID

## 2022-02-11 NOTE — TELEPHONE ENCOUNTER
Pt records reviewed.  Pt will be referred to Hepatology due to multiple liver masses   Initial referral received  from JAIRON Gupta   Referral letter sent to provider and patient.      RECORDS SCANNED IN MEDIA UNDER HEPATOLOGY REFERRAL .

## 2022-02-11 NOTE — TELEPHONE ENCOUNTER
"----- Message from Cassidy Westbrook sent at 2/11/2022 12:20 PM CST -----  Leonor Gary would like to refer the following patient to Hepatology STAT. The patients diagnosis is "worrisome for hepatic metastases.  I have scanned the patients referral and records into .     Thank you,   Cassidy Foley        "

## 2022-02-17 ENCOUNTER — TELEPHONE (OUTPATIENT)
Dept: HEPATOLOGY | Facility: CLINIC | Age: 62
End: 2022-02-17
Payer: MEDICAID

## 2022-02-17 ENCOUNTER — OFFICE VISIT (OUTPATIENT)
Dept: HEPATOLOGY | Facility: CLINIC | Age: 62
End: 2022-02-17
Payer: MEDICAID

## 2022-02-17 VITALS
HEART RATE: 66 BPM | RESPIRATION RATE: 18 BRPM | WEIGHT: 221.44 LBS | TEMPERATURE: 98 F | HEIGHT: 71 IN | DIASTOLIC BLOOD PRESSURE: 57 MMHG | OXYGEN SATURATION: 96 % | BODY MASS INDEX: 31 KG/M2 | SYSTOLIC BLOOD PRESSURE: 101 MMHG

## 2022-02-17 DIAGNOSIS — G47.30 SLEEP APNEA, UNSPECIFIED TYPE: ICD-10-CM

## 2022-02-17 DIAGNOSIS — R63.4 UNINTENTIONAL WEIGHT LOSS: ICD-10-CM

## 2022-02-17 DIAGNOSIS — E78.5 HYPERLIPIDEMIA, UNSPECIFIED HYPERLIPIDEMIA TYPE: ICD-10-CM

## 2022-02-17 DIAGNOSIS — J44.9 CHRONIC OBSTRUCTIVE PULMONARY DISEASE, UNSPECIFIED COPD TYPE: ICD-10-CM

## 2022-02-17 DIAGNOSIS — K76.9 LIVER LESION: ICD-10-CM

## 2022-02-17 DIAGNOSIS — R93.2 ABNORMAL FINDING ON IMAGING OF LIVER: Primary | ICD-10-CM

## 2022-02-17 DIAGNOSIS — I69.30 HISTORY OF CVA WITH RESIDUAL DEFICIT: ICD-10-CM

## 2022-02-17 PROBLEM — M16.9 OSTEOARTHRITIS OF HIP: Status: ACTIVE | Noted: 2022-02-17

## 2022-02-17 PROCEDURE — 3078F PR MOST RECENT DIASTOLIC BLOOD PRESSURE < 80 MM HG: ICD-10-PCS | Mod: CPTII,,, | Performed by: INTERNAL MEDICINE

## 2022-02-17 PROCEDURE — 4010F ACE/ARB THERAPY RXD/TAKEN: CPT | Mod: CPTII,,, | Performed by: INTERNAL MEDICINE

## 2022-02-17 PROCEDURE — 3008F BODY MASS INDEX DOCD: CPT | Mod: CPTII,,, | Performed by: INTERNAL MEDICINE

## 2022-02-17 PROCEDURE — 3078F DIAST BP <80 MM HG: CPT | Mod: CPTII,,, | Performed by: INTERNAL MEDICINE

## 2022-02-17 PROCEDURE — 99999 PR PBB SHADOW E&M-EST. PATIENT-LVL IV: ICD-10-PCS | Mod: PBBFAC,,, | Performed by: INTERNAL MEDICINE

## 2022-02-17 PROCEDURE — 1160F PR REVIEW ALL MEDS BY PRESCRIBER/CLIN PHARMACIST DOCUMENTED: ICD-10-PCS | Mod: CPTII,,, | Performed by: INTERNAL MEDICINE

## 2022-02-17 PROCEDURE — 3074F PR MOST RECENT SYSTOLIC BLOOD PRESSURE < 130 MM HG: ICD-10-PCS | Mod: CPTII,,, | Performed by: INTERNAL MEDICINE

## 2022-02-17 PROCEDURE — 1159F MED LIST DOCD IN RCRD: CPT | Mod: CPTII,,, | Performed by: INTERNAL MEDICINE

## 2022-02-17 PROCEDURE — 99204 OFFICE O/P NEW MOD 45 MIN: CPT | Mod: S$PBB,,, | Performed by: INTERNAL MEDICINE

## 2022-02-17 PROCEDURE — 1160F RVW MEDS BY RX/DR IN RCRD: CPT | Mod: CPTII,,, | Performed by: INTERNAL MEDICINE

## 2022-02-17 PROCEDURE — 3074F SYST BP LT 130 MM HG: CPT | Mod: CPTII,,, | Performed by: INTERNAL MEDICINE

## 2022-02-17 PROCEDURE — 3008F PR BODY MASS INDEX (BMI) DOCUMENTED: ICD-10-PCS | Mod: CPTII,,, | Performed by: INTERNAL MEDICINE

## 2022-02-17 PROCEDURE — 4010F PR ACE/ARB THEARPY RXD/TAKEN: ICD-10-PCS | Mod: CPTII,,, | Performed by: INTERNAL MEDICINE

## 2022-02-17 PROCEDURE — 99999 PR PBB SHADOW E&M-EST. PATIENT-LVL IV: CPT | Mod: PBBFAC,,, | Performed by: INTERNAL MEDICINE

## 2022-02-17 PROCEDURE — 99204 PR OFFICE/OUTPT VISIT, NEW, LEVL IV, 45-59 MIN: ICD-10-PCS | Mod: S$PBB,,, | Performed by: INTERNAL MEDICINE

## 2022-02-17 PROCEDURE — 99214 OFFICE O/P EST MOD 30 MIN: CPT | Mod: PBBFAC,PN | Performed by: INTERNAL MEDICINE

## 2022-02-17 PROCEDURE — 1159F PR MEDICATION LIST DOCUMENTED IN MEDICAL RECORD: ICD-10-PCS | Mod: CPTII,,, | Performed by: INTERNAL MEDICINE

## 2022-02-17 RX ORDER — DICLOFENAC SODIUM 10 MG/G
GEL TOPICAL
COMMUNITY
Start: 2022-01-31

## 2022-02-17 RX ORDER — FLUTICASONE PROPIONATE 50 MCG
1 SPRAY, SUSPENSION (ML) NASAL DAILY
COMMUNITY
Start: 2022-01-25

## 2022-02-17 RX ORDER — AZELASTINE 1 MG/ML
1 SPRAY, METERED NASAL 2 TIMES DAILY
COMMUNITY
Start: 2022-01-25

## 2022-02-17 RX ORDER — DICYCLOMINE HYDROCHLORIDE 20 MG/1
20 TABLET ORAL 3 TIMES DAILY PRN
COMMUNITY
Start: 2022-02-09

## 2022-02-17 RX ORDER — ONDANSETRON 8 MG/1
8 TABLET, ORALLY DISINTEGRATING ORAL EVERY 8 HOURS PRN
COMMUNITY
Start: 2022-02-09

## 2022-02-17 NOTE — TELEPHONE ENCOUNTER
[facility] Liver Pathology Conference Note    Patient:  Robinson Reardon  MRN:   [id number]  YOB: 1960  Date of Transplant:  N/A  Native Diagnosis:     Discussion/Plan:    Presenter: Hepatologist - Fawn Barcenas MD    Reason for presenting: diagnosis confirmation    Concerns for Pathologists: confirm no malignancy; no fibrosis; has lost 75 lbs wt unintentionally    Lab Results  WBC (K/uL)   Date Value   10/27/2021 6.89     PLT (K/uL)   Date Value   10/27/2021 210     CREATININE (mg/dL)   Date Value   10/27/2021 1.2

## 2022-02-17 NOTE — PROGRESS NOTES
HEPATOLOGY CONSULTATION    Referring Physician: Leonor Gupta NP  Current Corresponding Physician: Leonor Gupta NP    Reason for Consultation: Consultation for evaluation of Abnormal Abdominal/Liver Imaging    History of Present Illness: Robinson Reardon Sr. is a 61 y.o. malewith a hx of OA of hte hip, prior CVA, HTN, HLD, COPD, sleep apnea and COPD recently had teeth extracted and developed nausea and abdominal pain after taking pain medicine on an emplty stomach. For this he underwent both an abdominal US and CT scan that suggested possible metastatic lesion in the liver:    CT abdo pelvis w contrast 2/8/22: 1. Few subcutaneous masses are seen in the anterior abdominal wall, the largest of which measures 3.0 x 4.5 cm; 2. Multiple vari-sized hypoenhancing nodules (subcm-1.8 cm) are seen scattered throughout the liver parenchyma. These are worrisome for hepatic metastases. Correlate with clinical and laboratory findings as regards additional evaluation and follow-up; 3. A 1.3 x 1.3 cm (APxT) enhancing nodule is seen arising from the lateral limb of the left adrenal gland. This may possibly represent an adenoma. However, the possibility of an adrenal metastasis is not totally ruled out; 4. Few right renal cysts are seen, the largest of which is seen in the inferior pole, measuring 2.0 cm wide; 5. Surgical bowel resection and anastomotic changes are seen in the ileocecal region.     --liver lesions noted on ct abdo/pelvis 09/21 and thought to be liver cysts; also noted on ct scan 02/2012 and ct scan 11/2010    Abdo US 2/8/22: 1. The liver demonstrates a very heterogeneous coarse echo pattern containing numerous small cysts. FINDINGS are concerning for chronic liver parenchymal disease. Consider acquiring follow-up outpatient liver fibroscan (hepatic elastography) for further evaluation.     Labs 8/6/21: ALT 20, AST 25, ALKP 53, Tbil 0.4    11/5/21 underwent a cholecystectomy: the surgeon noted that the liver had multiple  cystic lesions as well as flat white lesions present.  Because of this a wedge biopsy performed of the right lobe of liver:    11/5/21: RIGHT LIVER, EXCISIONAL BIOPSY:  Liver tissue with numerous small biliary cysts and hamartomas.  Mild cholestasis.  No significant steatosis, inflammatory activity or fibrosis.  Negative for malignancy.  Comment: Trichrome staining supports the absence of fibrosis and the liver sample. An iron special stain is  negative.    He no longer has the abdominal pain. He has lost ~75 lbs over that last 2 years due to a change in eating habits. He states that he just does not feel like eating so he eats food off of a saucer rather than a dinner plate. He tells me he is up to date with colonoscopy and does recall undergoing an EGD.    Chief Complaint   Patient presents with    Abnormal Abdominal/Liver Imaging       Past Medical History:   Diagnosis Date    Asthma     CHF (congestive heart failure)     COPD (chronic obstructive pulmonary disease)     HLD (hyperlipidemia) 2/17/2022    Hyperlipidemia     Hypertension     Hyperthyroidism     Osteoarthritis of hip 2/17/2022    Sleep apnea 2017    Stroke      Outpatient Encounter Medications as of 2/17/2022   Medication Sig Dispense Refill    albuterol (ACCUNEB) 1.25 mg/3 mL Nebu TAKE 3MLS BY NUBULIZATION EVERY 6 HOURS AS NEEDED FOR WHEEZING 75 mL 0    albuterol (PROVENTIL/VENTOLIN HFA) 90 mcg/actuation inhaler Inhale 2 puffs into the lungs every 4 (four) hours as needed for Wheezing or Shortness of Breath. Rescue      amLODIPine (NORVASC) 10 MG tablet Take 10 mg by mouth once daily.      azelastine (ASTELIN) 137 mcg (0.1 %) nasal spray 1 spray 2 (two) times daily.      budesonide-formoterol 80-4.5 mcg (SYMBICORT) 80-4.5 mcg/actuation HFAA Inhale 2 puffs into the lungs. Controller      cetirizine (ZYRTEC) 10 MG tablet Take 10 mg by mouth once daily.      diclofenac sodium (VOLTAREN) 1 % Gel Apply topically.      dicyclomine  (BENTYL) 20 mg tablet Take 20 mg by mouth 3 (three) times daily as needed.      ketotifen (ZADITOR) 0.025 % (0.035 %) ophthalmic solution Place 1 drop into both eyes 2 (two) times daily.      latanoprost 0.005 % ophthalmic solution Place 1 drop into both eyes every evening.      losartan (COZAAR) 25 MG tablet Take 25 mg by mouth once daily.      lovastatin (MEVACOR) 40 MG tablet Take 40 mg by mouth every evening.      methocarbamol (ROBAXIN) 500 MG Tab Take 500 mg by mouth 2 (two) times daily.      mometasone-formoterol (DULERA) 200-5 mcg/actuation inhaler Inhale 1 puff into the lungs 2 (two) times daily. Controller      montelukast (SINGULAIR) 10 mg tablet Take 10 mg by mouth every evening.      ondansetron (ZOFRAN-ODT) 8 MG TbDL Take 8 mg by mouth every 8 (eight) hours as needed.      spironolactone (ALDACTONE) 25 MG tablet Take 25 mg by mouth once daily.      tamsulosin (FLOMAX) 0.4 mg Cp24 Take 0.4 mg by mouth once daily.      tiotropium bromide (SPIRIVA RESPIMAT) 1.25 mcg/actuation inhaler Inhale 1 puff into the lungs once daily. Controller      bumetanide (BUMEX) 1 MG tablet Take 1 mg by mouth 2 (two) times daily.      famotidine (PEPCID) 20 MG tablet Take 20 mg by mouth 2 (two) times daily.      fluticasone propionate (FLONASE) 50 mcg/actuation nasal spray 1 spray by Each Nostril route once daily.      NIFEdipine (ADALAT CC) 90 MG TbSR Take 30 mg by mouth once daily.      oxyCODONE-acetaminophen (PERCOCET) 7.5-325 mg per tablet Take 1 tablet by mouth every 6 (six) hours as needed. (Patient not taking: No sig reported) 28 tablet 0    pantoprazole (PROTONIX) 20 MG tablet Take 1 tablet (20 mg total) by mouth once daily. (Patient not taking: Reported on 2/17/2022) 30 tablet 1    rosuvastatin (CRESTOR) 40 MG Tab Take 10 mg by mouth every evening.      sildenafiL (VIAGRA) 50 MG tablet Take 50 mg by mouth daily as needed for Erectile Dysfunction.      traMADoL (ULTRAM) 50 mg tablet Take 1 tablet  "(50 mg total) by mouth every 6 (six) hours as needed for Pain. (Patient not taking: Reported on 2/17/2022) 20 tablet 0     No facility-administered encounter medications on file as of 2/17/2022.     Review of patient's allergies indicates:   Allergen Reactions    Ace inhibitors Other (See Comments) and Swelling     angioedema  Face, lips, tongue swelling      Lisinopril Swelling    Betadine [povidone-iodine] Rash     Pt stated when he was donating blood years ago, skin turned another color     History reviewed. No pertinent family history.    Social History     Socioeconomic History    Marital status: Single   Tobacco Use    Smoking status: Former Smoker     Packs/day: 0.25     Years: 24.00     Pack years: 6.00     Types: Cigarettes     Quit date: 3/7/2018     Years since quitting: 3.9    Smokeless tobacco: Never Used   Substance and Sexual Activity    Alcohol use: Not Currently    Drug use: Yes     Types: "Crack" cocaine, Marijuana     Comment: Hx of Crack cocaine 15 yrs     Review of Systems   Constitutional: Negative.    HENT: Negative.    Eyes: Negative.    Respiratory: Negative.    Cardiovascular: Negative.    Gastrointestinal: Negative.    Genitourinary: Negative.    Musculoskeletal: Negative.    Skin: Negative.    Neurological: Negative.    Psychiatric/Behavioral: Negative.      Vitals:    02/17/22 0927   BP: (!) 101/57   Pulse: 66   Resp: 18   Temp: 98 °F (36.7 °C)       Physical Exam  Vitals reviewed.   Constitutional:       Appearance: He is well-developed and well-nourished.   HENT:      Head: Normocephalic and atraumatic.   Eyes:      General: No scleral icterus.     Extraocular Movements: EOM normal.      Conjunctiva/sclera: Conjunctivae normal.      Pupils: Pupils are equal, round, and reactive to light.   Neck:      Thyroid: No thyromegaly.   Cardiovascular:      Rate and Rhythm: Normal rate and regular rhythm.      Heart sounds: Normal heart sounds.   Pulmonary:      Effort: Pulmonary effort " is normal.      Breath sounds: Normal breath sounds. No rales.   Abdominal:      General: Bowel sounds are normal. There is no distension.      Palpations: Abdomen is soft. There is no mass.      Tenderness: There is no abdominal tenderness.   Musculoskeletal:         General: No edema. Normal range of motion.      Cervical back: Normal range of motion and neck supple.   Skin:     General: Skin is warm and dry.      Findings: No rash.   Neurological:      Mental Status: He is alert and oriented to person, place, and time.   Psychiatric:         Mood and Affect: Mood and affect normal.         Computed MELD-Na score unavailable. Necessary lab results were not found in the last year.  Computed MELD score unavailable. Necessary lab results were not found in the last year.    Lab Results   Component Value Date    GLU 90 10/27/2021    BUN 14 10/27/2021    CREATININE 1.2 10/27/2021    CALCIUM 9.7 10/27/2021     10/27/2021    K 4.3 10/27/2021     10/27/2021    CO2 26 10/27/2021    ANIONGAP 8 10/27/2021    WBC 6.89 10/27/2021    RBC 4.83 10/27/2021    HGB 13.7 (L) 10/27/2021    HCT 43.3 10/27/2021    MCV 90 10/27/2021    MCH 28.4 10/27/2021    MCHC 31.6 (L) 10/27/2021     Lab Results   Component Value Date    RDW 14.0 10/27/2021     10/27/2021    MPV 10.5 10/27/2021    GRAN 4.5 10/27/2021    GRAN 65.1 10/27/2021    LYMPH 1.5 10/27/2021    LYMPH 21.8 10/27/2021    MONO 0.6 10/27/2021    MONO 9.1 10/27/2021    EOSINOPHIL 2.8 10/27/2021    BASOPHIL 0.9 10/27/2021    EOS 0.2 10/27/2021    BASO 0.06 10/27/2021    TRIG 110 02/08/2022       Assessment and Plan:  Patient Active Problem List   Diagnosis    Low back pain    Calculus of gallbladder with cholecystitis with biliary obstruction    Liver lesions    Osteoarthritis of hip    Sleep apnea    HLD (hyperlipidemia)    COPD (chronic obstructive pulmonary disease)     Robinson Reardon Sr. is a 61 y.o. male with liver cysts on imaging dating back to 2010. One  of the CT reads suggested possible significant fibrosis, but intra-op biopsy does not show any fibrosis on trichrome. Pathology of liver lesions are benign hemartomas and small biliary cysts. Liver lesions noted imaging have been there dating back to 2010 and have been read as liver cysts rather than solid lesion. His liver enzymes are normal.    Overall, I do not think he has chronic liver disease and the liver lesions are most likely simple cysts. I do not have a liver reason for him to have lost weight. II will review his films in our radiology conference and will review his liver biopsy. I will call him after these 2 reviews.    I have asked him to f/u with his pcp re his weight loss and the lesion noted on the adrenal gland.    Return prn.

## 2022-02-17 NOTE — PATIENT INSTRUCTIONS
1. Since 2020 has lost 75 lbs  2. Will review films  3. I will look at your liver biopsy done at the time your gallbladder was removed next Thursday  4. Will get scopes from [facility]  Will call you to let you know

## 2022-02-17 NOTE — TELEPHONE ENCOUNTER
Patient: Robinson Reardon Sr.       MRN: [id number]      : 1960     Age: 61 y.o.  [address]  [address]  [facility] [address]    Providers  Hepatologists: Fawn Barcenas MD  Surgeons: none  Radiologists: none  Advanced Practice providers:     Priority of review: Benign disease    Patient Transplant Status: Other no need for liver transplant    Reason for presentation: Indeterminate lesion    Clinical Summary:  Robinson Reardon Sr. is a 61 y.o. malewith a hx of OA of hte hip, prior CVA, HTN, HLD, COPD, sleep apnea and COPD recently had teeth extracted and developed nausea and abdominal pain after taking pain medicine on an emplty stomach. For this he underwent both an abdominal US and CT scan that suggested possible metastatic lesions in the liver:    CT abdo pelvis w contrast 22: 1. Few subcutaneous masses are seen in the anterior abdominal wall, the largest of which measures 3.0 x 4.5 cm; 2. Multiple vari-sized hypoenhancing nodules (subcm-1.8 cm) are seen scattered throughout the liver parenchyma. These are worrisome for hepatic metastases. Correlate with clinical and laboratory findings as regards additional evaluation and follow-up; 3. A 1.3 x 1.3 cm (APxT) enhancing nodule is seen arising from the lateral limb of the left adrenal gland. This may possibly represent an adenoma. However, the possibility of an adrenal metastasis is not totally ruled out; 4. Few right renal cysts are seen, the largest of which is seen in the inferior pole, measuring 2.0 cm wide; 5. Surgical bowel resection and anastomotic changes are seen in the ileocecal region.     --liver lesions noted on ct abdo/pelvis  and thought to be liver cysts; also noted on ct scan 2012 and ct scan 2010    Abdo US 22: 1. The liver demonstrates a very heterogeneous coarse echo pattern containing numerous small cysts. FINDINGS are concerning for chronic liver parenchymal disease. Consider acquiring follow-up outpatient liver  fibroscan (hepatic elastography) for further evaluation.     Labs 8/6/21: ALT 20, AST 25, ALKP 53, Tbil 0.4    11/5/21 underwent a cholecystectomy: the surgeon noted that the liver had multiple cystic lesions as well as flat white lesions present.  Because of this a wedge biopsy performed of the right lobe of liver:    11/5/21: RIGHT LIVER, EXCISIONAL BIOPSY:  Liver tissue with numerous small biliary cysts and hamartomas.  Mild cholestasis.  No significant steatosis, inflammatory activity or fibrosis.  Negative for malignancy.  Comment: Trichrome staining supports the absence of fibrosis and the liver sample. An iron special stain is  negative.    He no longer has the abdominal pain. He has lost ~75 lbs over that last 2 years due to a change in eating habits. He states that he just does not feel like eating so he eats food off of a saucer rather than a dinner plate. He tells me he is up to date with colonoscopy and does recall undergoing an EGD.      Imaging to be reviewed: outside ct scans    HCC Treatment History: n/a    ABO:     Platelets:   Lab Results   Component Value Date/Time     10/27/2021 09:50 AM     Creatinine:   Lab Results   Component Value Date/Time    CREATININE 1.2 10/27/2021 09:50 AM     Bilirubin: No results found for: BILITOT, EXTBILITOTAL, EXTBILIRUBIN  AFP Last 3 each if available: No results found for: AFP, EXTAFP    MELD: Computed MELD-Na score unavailable. Necessary lab results were not found in the last year.  Computed MELD score unavailable. Necessary lab results were not found in the last year.    Plan:     Follow-up Provider:

## 2022-02-24 ENCOUNTER — CONFERENCE (OUTPATIENT)
Dept: TRANSPLANT | Facility: CLINIC | Age: 62
End: 2022-02-24
Payer: MEDICAID

## 2022-02-24 ENCOUNTER — PATIENT MESSAGE (OUTPATIENT)
Dept: HEPATOLOGY | Facility: CLINIC | Age: 62
End: 2022-02-24
Payer: MEDICAID

## 2022-02-24 ENCOUNTER — TELEPHONE (OUTPATIENT)
Dept: HEPATOLOGY | Facility: CLINIC | Age: 62
End: 2022-02-24
Payer: MEDICAID

## 2022-02-24 DIAGNOSIS — K76.9 LIVER LESION: Primary | ICD-10-CM

## 2022-02-24 NOTE — TELEPHONE ENCOUNTER
Liver Pathology Conference:    Liver Biopsy:  Chronic cholecystitis/ mild cholestasis/  Benign cystic structures - Harmatomas and biliary cysts-  no malignancy , ? Infection?

## 2022-02-25 ENCOUNTER — TELEPHONE (OUTPATIENT)
Dept: HEPATOLOGY | Facility: CLINIC | Age: 62
End: 2022-02-25
Payer: MEDICAID

## 2022-02-25 NOTE — TELEPHONE ENCOUNTER
Spoke with Mr. 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 He is scheduled for the MRI 3/10/2022 and the lab to be done at [facility] per his request

## 2022-03-10 ENCOUNTER — LAB VISIT (OUTPATIENT)
Dept: LAB | Facility: OTHER | Age: 62
End: 2022-03-10
Attending: INTERNAL MEDICINE
Payer: MEDICAID

## 2022-03-10 DIAGNOSIS — K76.9 LIVER LESION: ICD-10-CM

## 2022-03-10 DIAGNOSIS — Z20.2 POTENTIAL EXPOSURE TO STD: ICD-10-CM

## 2022-03-10 DIAGNOSIS — Z20.2 POSSIBLE EXPOSURE TO STD: Primary | ICD-10-CM

## 2022-03-10 LAB
CREAT SERPL-MCNC: 1.1 MG/DL (ref 0.5–1.4)
EST. GFR  (AFRICAN AMERICAN): >60 ML/MIN/1.73 M^2
EST. GFR  (NON AFRICAN AMERICAN): >60 ML/MIN/1.73 M^2

## 2022-03-10 PROCEDURE — 82565 ASSAY OF CREATININE: CPT | Performed by: INTERNAL MEDICINE

## 2022-03-10 PROCEDURE — 86592 SYPHILIS TEST NON-TREP QUAL: CPT | Performed by: NURSE PRACTITIONER

## 2022-03-10 PROCEDURE — 36415 COLL VENOUS BLD VENIPUNCTURE: CPT | Performed by: INTERNAL MEDICINE

## 2022-03-10 PROCEDURE — 80074 ACUTE HEPATITIS PANEL: CPT | Performed by: NURSE PRACTITIONER

## 2022-03-10 PROCEDURE — 87389 HIV-1 AG W/HIV-1&-2 AB AG IA: CPT | Performed by: NURSE PRACTITIONER

## 2022-03-11 LAB — RPR SER QL: NORMAL

## 2022-03-13 LAB
HAV IGM SERPL QL IA: NEGATIVE
HBV CORE IGM SERPL QL IA: NEGATIVE
HBV SURFACE AG SERPL QL IA: NEGATIVE
HCV AB SERPL QL IA: NEGATIVE

## 2022-03-14 LAB — HIV 1+2 AB+HIV1 P24 AG SERPL QL IA: NEGATIVE

## 2022-03-17 ENCOUNTER — HOSPITAL ENCOUNTER (OUTPATIENT)
Dept: RADIOLOGY | Facility: OTHER | Age: 62
Discharge: HOME | End: 2022-03-17
Attending: INTERNAL MEDICINE
Payer: MEDICAID

## 2022-03-17 DIAGNOSIS — K76.9 LIVER LESION: ICD-10-CM

## 2022-03-17 PROCEDURE — 74181 MRI ABDOMEN W/O CONTRAST: CPT | Mod: TC

## 2022-03-17 PROCEDURE — 74181 MRI ABDOMEN W/O CONTRAST: CPT | Mod: 26,,, | Performed by: RADIOLOGY

## 2022-03-17 PROCEDURE — 74181 MRI ABDOMEN WITHOUT CONTRAST: ICD-10-PCS | Mod: 26,,, | Performed by: RADIOLOGY

## 2024-08-06 ENCOUNTER — HOSPITAL ENCOUNTER (INPATIENT)
Facility: OTHER | Age: 64
LOS: 2 days | Discharge: HOME | DRG: 176 | End: 2024-08-09
Attending: EMERGENCY MEDICINE | Admitting: HOSPITALIST
Payer: MEDICARE

## 2024-08-06 DIAGNOSIS — I26.99 PULMONARY EMBOLISM: ICD-10-CM

## 2024-08-06 DIAGNOSIS — I26.99 ACUTE PULMONARY EMBOLISM, UNSPECIFIED PULMONARY EMBOLISM TYPE, UNSPECIFIED WHETHER ACUTE COR PULMONALE PRESENT: Primary | ICD-10-CM

## 2024-08-06 DIAGNOSIS — R07.9 CHEST PAIN: ICD-10-CM

## 2024-08-06 PROCEDURE — 93005 ELECTROCARDIOGRAM TRACING: CPT

## 2024-08-06 PROCEDURE — 99285 EMERGENCY DEPT VISIT HI MDM: CPT | Mod: 25

## 2024-08-06 PROCEDURE — 93010 ELECTROCARDIOGRAM REPORT: CPT | Mod: ,,, | Performed by: INTERNAL MEDICINE

## 2024-08-06 RX ORDER — NITROGLYCERIN 0.4 MG/1
0.4 TABLET SUBLINGUAL EVERY 5 MIN PRN
Status: COMPLETED | OUTPATIENT
Start: 2024-08-07 | End: 2024-08-07

## 2024-08-06 NOTE — Clinical Note
Diagnosis: Acute pulmonary embolism, unspecified pulmonary embolism type, unspecified whether acute cor pulmonale present [[id number]]   Future Attending Provider: ELI WHITTINGTON [[id number]]

## 2024-08-07 PROBLEM — I10 PRIMARY HYPERTENSION: Status: ACTIVE | Noted: 2024-08-07

## 2024-08-07 PROBLEM — I26.99 ACUTE PULMONARY EMBOLISM WITHOUT ACUTE COR PULMONALE: Status: ACTIVE | Noted: 2024-08-07

## 2024-08-07 LAB
ANION GAP SERPL CALC-SCNC: 10 MMOL/L (ref 8–16)
AV INDEX (PROSTH): 0.72
AV MEAN GRADIENT: 4 MMHG
AV PEAK GRADIENT: 8 MMHG
AV VALVE AREA BY VELOCITY RATIO: 3.5 CM²
AV VALVE AREA: 3.78 CM²
AV VELOCITY RATIO: 0.66
BASOPHILS # BLD AUTO: 0.03 K/UL (ref 0–0.2)
BASOPHILS NFR BLD: 0.3 % (ref 0–1.9)
BSA FOR ECHO PROCEDURE: 2.24 M2
BUN SERPL-MCNC: 14 MG/DL (ref 8–23)
CALCIUM SERPL-MCNC: 9.3 MG/DL (ref 8.7–10.5)
CHLORIDE SERPL-SCNC: 103 MMOL/L (ref 95–110)
CO2 SERPL-SCNC: 26 MMOL/L (ref 23–29)
CREAT SERPL-MCNC: 1.4 MG/DL (ref 0.5–1.4)
CV ECHO LV RWT: 0.33 CM
D DIMER PPP IA.FEU-MCNC: 18.36 MG/L FEU
DIFFERENTIAL METHOD BLD: ABNORMAL
DOP CALC AO PEAK VEL: 1.37 M/S
DOP CALC AO VTI: 26.9 CM
DOP CALC LVOT AREA: 5.3 CM2
DOP CALC LVOT DIAMETER: 2.59 CM
DOP CALC LVOT PEAK VEL: 0.91 M/S
DOP CALC LVOT STROKE VOLUME: 101.63 CM3
DOP CALCLVOT PEAK VEL VTI: 19.3 CM
E WAVE DECELERATION TIME: 204.85 MSEC
E/A RATIO: 0.64
E/E' RATIO: 9.82 M/S
ECHO LV POSTERIOR WALL: 0.98 CM (ref 0.6–1.1)
EJECTION FRACTION: 60 %
EOSINOPHIL # BLD AUTO: 0.1 K/UL (ref 0–0.5)
EOSINOPHIL NFR BLD: 0.6 % (ref 0–8)
ERYTHROCYTE [DISTWIDTH] IN BLOOD BY AUTOMATED COUNT: 14.1 % (ref 11.5–14.5)
EST. GFR  (NO RACE VARIABLE): 55 ML/MIN/1.73 M^2
FRACTIONAL SHORTENING: 41 % (ref 28–44)
GLUCOSE SERPL-MCNC: 89 MG/DL (ref 70–110)
HCT VFR BLD AUTO: 39.9 % (ref 40–54)
HGB BLD-MCNC: 12.8 G/DL (ref 14–18)
IMM GRANULOCYTES # BLD AUTO: 0.18 K/UL (ref 0–0.04)
IMM GRANULOCYTES NFR BLD AUTO: 1.7 % (ref 0–0.5)
INTERVENTRICULAR SEPTUM: 0.96 CM (ref 0.6–1.1)
IVRT: 76.12 MSEC
LA MAJOR: 4.96 CM
LA MINOR: 4.68 CM
LA WIDTH: 4.2 CM
LEFT ATRIUM SIZE: 3.85 CM
LEFT ATRIUM VOLUME INDEX: 30.1 ML/M2
LEFT ATRIUM VOLUME: 66.19 CM3
LEFT INTERNAL DIMENSION IN SYSTOLE: 3.47 CM (ref 2.1–4)
LEFT VENTRICLE DIASTOLIC VOLUME INDEX: 79.27 ML/M2
LEFT VENTRICLE DIASTOLIC VOLUME: 174.4 ML
LEFT VENTRICLE MASS INDEX: 105 G/M2
LEFT VENTRICLE SYSTOLIC VOLUME INDEX: 22.6 ML/M2
LEFT VENTRICLE SYSTOLIC VOLUME: 49.71 ML
LEFT VENTRICULAR INTERNAL DIMENSION IN DIASTOLE: 5.92 CM (ref 3.5–6)
LEFT VENTRICULAR MASS: 231.99 G
LV LATERAL E/E' RATIO: 9 M/S
LV SEPTAL E/E' RATIO: 10.8 M/S
LVED V (TEICH): 174.4 ML
LVES V (TEICH): 49.71 ML
LVOT MG: 1.72 MMHG
LVOT MV: 0.63 CM/S
LYMPHOCYTES # BLD AUTO: 1.5 K/UL (ref 1–4.8)
LYMPHOCYTES NFR BLD: 14 % (ref 18–48)
MCH RBC QN AUTO: 27.4 PG (ref 27–31)
MCHC RBC AUTO-ENTMCNC: 32.1 G/DL (ref 32–36)
MCV RBC AUTO: 85 FL (ref 82–98)
MONOCYTES # BLD AUTO: 0.9 K/UL (ref 0.3–1)
MONOCYTES NFR BLD: 8.4 % (ref 4–15)
MV PEAK A VEL: 0.85 M/S
MV PEAK E VEL: 0.54 M/S
MV STENOSIS PRESSURE HALF TIME: 61.32 MS
MV VALVE AREA P 1/2 METHOD: 3.59 CM2
NEUTROPHILS # BLD AUTO: 7.8 K/UL (ref 1.8–7.7)
NEUTROPHILS NFR BLD: 75 % (ref 38–73)
NRBC BLD-RTO: 0 /100 WBC
OHS QRS DURATION: 92 MS
OHS QRS DURATION: 96 MS
OHS QTC CALCULATION: 402 MS
OHS QTC CALCULATION: 426 MS
PISA TR MAX VEL: 1.96 M/S
PLATELET # BLD AUTO: 221 K/UL (ref 150–450)
PMV BLD AUTO: 9.5 FL (ref 9.2–12.9)
POTASSIUM SERPL-SCNC: 3.5 MMOL/L (ref 3.5–5.1)
PULM VEIN S/D RATIO: 1.53
PV PEAK D VEL: 0.32 M/S
PV PEAK GRADIENT: 3 MMHG
PV PEAK S VEL: 0.49 M/S
PV PEAK VELOCITY: 0.82 M/S
RA MAJOR: 4.48 CM
RA PRESSURE ESTIMATED: 3 MMHG
RA WIDTH: 3.4 CM
RBC # BLD AUTO: 4.67 M/UL (ref 4.6–6.2)
RV TB RVSP: 5 MMHG
SODIUM SERPL-SCNC: 139 MMOL/L (ref 136–145)
TDI LATERAL: 0.06 M/S
TDI SEPTAL: 0.05 M/S
TDI: 0.06 M/S
TR MAX PG: 15 MMHG
TROPONIN I SERPL DL<=0.01 NG/ML-MCNC: 0.03 NG/ML (ref 0–0.03)
TV REST PULMONARY ARTERY PRESSURE: 18 MMHG
WBC # BLD AUTO: 10.39 K/UL (ref 3.9–12.7)
Z-SCORE OF LEFT VENTRICULAR DIMENSION IN END DIASTOLE: -2.38
Z-SCORE OF LEFT VENTRICULAR DIMENSION IN END SYSTOLE: -2.17

## 2024-08-07 PROCEDURE — 94761 N-INVAS EAR/PLS OXIMETRY MLT: CPT

## 2024-08-07 PROCEDURE — 11000001 HC ACUTE MED/SURG PRIVATE ROOM

## 2024-08-07 PROCEDURE — 25000003 PHARM REV CODE 250: Performed by: HOSPITALIST

## 2024-08-07 PROCEDURE — 85025 COMPLETE CBC W/AUTO DIFF WBC: CPT | Performed by: EMERGENCY MEDICINE

## 2024-08-07 PROCEDURE — 63600175 PHARM REV CODE 636 W HCPCS: Performed by: NURSE PRACTITIONER

## 2024-08-07 PROCEDURE — 96374 THER/PROPH/DIAG INJ IV PUSH: CPT

## 2024-08-07 PROCEDURE — 25000242 PHARM REV CODE 250 ALT 637 W/ HCPCS: Performed by: EMERGENCY MEDICINE

## 2024-08-07 PROCEDURE — 85379 FIBRIN DEGRADATION QUANT: CPT | Performed by: EMERGENCY MEDICINE

## 2024-08-07 PROCEDURE — 25000003 PHARM REV CODE 250: Performed by: NURSE PRACTITIONER

## 2024-08-07 PROCEDURE — 25500020 PHARM REV CODE 255: Performed by: EMERGENCY MEDICINE

## 2024-08-07 PROCEDURE — 94640 AIRWAY INHALATION TREATMENT: CPT

## 2024-08-07 PROCEDURE — 93005 ELECTROCARDIOGRAM TRACING: CPT

## 2024-08-07 PROCEDURE — 84484 ASSAY OF TROPONIN QUANT: CPT | Performed by: EMERGENCY MEDICINE

## 2024-08-07 PROCEDURE — 99900035 HC TECH TIME PER 15 MIN (STAT)

## 2024-08-07 PROCEDURE — 96372 THER/PROPH/DIAG INJ SC/IM: CPT | Performed by: EMERGENCY MEDICINE

## 2024-08-07 PROCEDURE — 63600175 PHARM REV CODE 636 W HCPCS: Performed by: EMERGENCY MEDICINE

## 2024-08-07 PROCEDURE — 80048 BASIC METABOLIC PNL TOTAL CA: CPT | Performed by: EMERGENCY MEDICINE

## 2024-08-07 PROCEDURE — 96375 TX/PRO/DX INJ NEW DRUG ADDON: CPT

## 2024-08-07 PROCEDURE — 27000221 HC OXYGEN, UP TO 24 HOURS

## 2024-08-07 PROCEDURE — 96376 TX/PRO/DX INJ SAME DRUG ADON: CPT

## 2024-08-07 PROCEDURE — 93010 ELECTROCARDIOGRAM REPORT: CPT | Mod: ,,, | Performed by: INTERNAL MEDICINE

## 2024-08-07 PROCEDURE — 25000242 PHARM REV CODE 250 ALT 637 W/ HCPCS: Performed by: NURSE PRACTITIONER

## 2024-08-07 RX ORDER — MORPHINE SULFATE 2 MG/ML
2 INJECTION, SOLUTION INTRAMUSCULAR; INTRAVENOUS
Status: COMPLETED | OUTPATIENT
Start: 2024-08-07 | End: 2024-08-07

## 2024-08-07 RX ORDER — LEVOTHYROXINE SODIUM 50 UG/1
50 TABLET ORAL
Status: DISCONTINUED | OUTPATIENT
Start: 2024-08-07 | End: 2024-08-09 | Stop reason: HOSPADM

## 2024-08-07 RX ORDER — ENOXAPARIN SODIUM 100 MG/ML
1 INJECTION SUBCUTANEOUS EVERY 12 HOURS
Status: DISCONTINUED | OUTPATIENT
Start: 2024-08-07 | End: 2024-08-07

## 2024-08-07 RX ORDER — ATORVASTATIN CALCIUM 20 MG/1
40 TABLET, FILM COATED ORAL DAILY
Status: DISCONTINUED | OUTPATIENT
Start: 2024-08-07 | End: 2024-08-09 | Stop reason: HOSPADM

## 2024-08-07 RX ORDER — HYDRALAZINE HYDROCHLORIDE 20 MG/ML
10 INJECTION INTRAMUSCULAR; INTRAVENOUS EVERY 6 HOURS PRN
Status: DISCONTINUED | OUTPATIENT
Start: 2024-08-07 | End: 2024-08-09 | Stop reason: HOSPADM

## 2024-08-07 RX ORDER — AMLODIPINE BESYLATE 5 MG/1
5 TABLET ORAL DAILY
Status: DISCONTINUED | OUTPATIENT
Start: 2024-08-07 | End: 2024-08-09 | Stop reason: HOSPADM

## 2024-08-07 RX ORDER — METOPROLOL SUCCINATE 25 MG/1
25 TABLET, EXTENDED RELEASE ORAL DAILY
Status: DISCONTINUED | OUTPATIENT
Start: 2024-08-07 | End: 2024-08-07

## 2024-08-07 RX ORDER — MONTELUKAST SODIUM 10 MG/1
10 TABLET ORAL DAILY
Status: DISCONTINUED | OUTPATIENT
Start: 2024-08-07 | End: 2024-08-07

## 2024-08-07 RX ORDER — LOSARTAN POTASSIUM 25 MG/1
25 TABLET ORAL DAILY
Status: DISCONTINUED | OUTPATIENT
Start: 2024-08-07 | End: 2024-08-07

## 2024-08-07 RX ORDER — LANOLIN ALCOHOL/MO/W.PET/CERES
1000 CREAM (GRAM) TOPICAL DAILY
COMMUNITY

## 2024-08-07 RX ORDER — TAMSULOSIN HYDROCHLORIDE 0.4 MG/1
0.4 CAPSULE ORAL DAILY
Status: DISCONTINUED | OUTPATIENT
Start: 2024-08-07 | End: 2024-08-07

## 2024-08-07 RX ORDER — FINASTERIDE 5 MG/1
5 TABLET, FILM COATED ORAL DAILY
COMMUNITY

## 2024-08-07 RX ORDER — IBUPROFEN 200 MG
16 TABLET ORAL
Status: DISCONTINUED | OUTPATIENT
Start: 2024-08-07 | End: 2024-08-07

## 2024-08-07 RX ORDER — METOPROLOL SUCCINATE 25 MG/1
25 TABLET, EXTENDED RELEASE ORAL DAILY
COMMUNITY

## 2024-08-07 RX ORDER — ONDANSETRON HYDROCHLORIDE 2 MG/ML
4 INJECTION, SOLUTION INTRAVENOUS
Status: COMPLETED | OUTPATIENT
Start: 2024-08-07 | End: 2024-08-07

## 2024-08-07 RX ORDER — FLUTICASONE FUROATE AND VILANTEROL 100; 25 UG/1; UG/1
1 POWDER RESPIRATORY (INHALATION) DAILY
Status: DISCONTINUED | OUTPATIENT
Start: 2024-08-07 | End: 2024-08-09 | Stop reason: HOSPADM

## 2024-08-07 RX ORDER — NALOXONE HCL 0.4 MG/ML
0.02 VIAL (ML) INJECTION
Status: DISCONTINUED | OUTPATIENT
Start: 2024-08-07 | End: 2024-08-09 | Stop reason: HOSPADM

## 2024-08-07 RX ORDER — TAMSULOSIN HYDROCHLORIDE 0.4 MG/1
0.8 CAPSULE ORAL DAILY
Status: DISCONTINUED | OUTPATIENT
Start: 2024-08-07 | End: 2024-08-09 | Stop reason: HOSPADM

## 2024-08-07 RX ORDER — AMLODIPINE BESYLATE 5 MG/1
5 TABLET ORAL DAILY
Status: DISCONTINUED | OUTPATIENT
Start: 2024-08-07 | End: 2024-08-07

## 2024-08-07 RX ORDER — LEVOTHYROXINE SODIUM 50 UG/1
50 TABLET ORAL
Status: DISCONTINUED | OUTPATIENT
Start: 2024-08-08 | End: 2024-08-07

## 2024-08-07 RX ORDER — LEVOCETIRIZINE DIHYDROCHLORIDE 5 MG/1
5 TABLET, FILM COATED ORAL NIGHTLY
COMMUNITY

## 2024-08-07 RX ORDER — SODIUM CHLORIDE 0.9 % (FLUSH) 0.9 %
10 SYRINGE (ML) INJECTION EVERY 8 HOURS PRN
Status: DISCONTINUED | OUTPATIENT
Start: 2024-08-07 | End: 2024-08-09 | Stop reason: HOSPADM

## 2024-08-07 RX ORDER — ATORVASTATIN CALCIUM 40 MG/1
40 TABLET, FILM COATED ORAL DAILY
COMMUNITY

## 2024-08-07 RX ORDER — SPIRONOLACTONE 25 MG/1
50 TABLET ORAL DAILY
Status: DISCONTINUED | OUTPATIENT
Start: 2024-08-07 | End: 2024-08-09

## 2024-08-07 RX ORDER — FUROSEMIDE 40 MG/1
40 TABLET ORAL DAILY
Status: DISCONTINUED | OUTPATIENT
Start: 2024-08-07 | End: 2024-08-09 | Stop reason: HOSPADM

## 2024-08-07 RX ORDER — LEVOTHYROXINE SODIUM 50 UG/1
50 TABLET ORAL
COMMUNITY

## 2024-08-07 RX ORDER — IBUPROFEN 200 MG
24 TABLET ORAL
Status: DISCONTINUED | OUTPATIENT
Start: 2024-08-07 | End: 2024-08-07

## 2024-08-07 RX ORDER — ENOXAPARIN SODIUM 100 MG/ML
1 INJECTION SUBCUTANEOUS EVERY 12 HOURS
Status: DISCONTINUED | OUTPATIENT
Start: 2024-08-07 | End: 2024-08-08

## 2024-08-07 RX ORDER — ACETAMINOPHEN 325 MG/1
650 TABLET ORAL EVERY 4 HOURS PRN
Status: DISCONTINUED | OUTPATIENT
Start: 2024-08-07 | End: 2024-08-09 | Stop reason: HOSPADM

## 2024-08-07 RX ORDER — LORAZEPAM 2 MG/ML
1 INJECTION INTRAMUSCULAR
Status: COMPLETED | OUTPATIENT
Start: 2024-08-07 | End: 2024-08-07

## 2024-08-07 RX ORDER — ONDANSETRON HYDROCHLORIDE 2 MG/ML
4 INJECTION, SOLUTION INTRAVENOUS EVERY 8 HOURS PRN
Status: DISCONTINUED | OUTPATIENT
Start: 2024-08-07 | End: 2024-08-09 | Stop reason: HOSPADM

## 2024-08-07 RX ORDER — OXYCODONE AND ACETAMINOPHEN 5; 325 MG/1; MG/1
1 TABLET ORAL EVERY 4 HOURS PRN
Status: DISCONTINUED | OUTPATIENT
Start: 2024-08-07 | End: 2024-08-08

## 2024-08-07 RX ORDER — GLUCAGON 1 MG
1 KIT INJECTION
Status: DISCONTINUED | OUTPATIENT
Start: 2024-08-07 | End: 2024-08-07

## 2024-08-07 RX ORDER — FUROSEMIDE 40 MG/1
40 TABLET ORAL DAILY
COMMUNITY

## 2024-08-07 RX ORDER — ENOXAPARIN SODIUM 100 MG/ML
1 INJECTION SUBCUTANEOUS
Status: COMPLETED | OUTPATIENT
Start: 2024-08-07 | End: 2024-08-07

## 2024-08-07 RX ORDER — METOPROLOL SUCCINATE 25 MG/1
25 TABLET, EXTENDED RELEASE ORAL DAILY
Status: DISCONTINUED | OUTPATIENT
Start: 2024-08-07 | End: 2024-08-09 | Stop reason: HOSPADM

## 2024-08-07 RX ORDER — FINASTERIDE 5 MG/1
5 TABLET, FILM COATED ORAL DAILY
Status: DISCONTINUED | OUTPATIENT
Start: 2024-08-07 | End: 2024-08-07

## 2024-08-07 RX ORDER — FINASTERIDE 5 MG/1
5 TABLET, FILM COATED ORAL DAILY
Status: DISCONTINUED | OUTPATIENT
Start: 2024-08-07 | End: 2024-08-09 | Stop reason: HOSPADM

## 2024-08-07 RX ORDER — ALBUTEROL SULFATE 2.5 MG/.5ML
2.5 SOLUTION RESPIRATORY (INHALATION) EVERY 4 HOURS PRN
Status: DISCONTINUED | OUTPATIENT
Start: 2024-08-07 | End: 2024-08-09 | Stop reason: HOSPADM

## 2024-08-07 RX ORDER — SPIRONOLACTONE 25 MG/1
25 TABLET ORAL DAILY
Status: DISCONTINUED | OUTPATIENT
Start: 2024-08-07 | End: 2024-08-07

## 2024-08-07 RX ORDER — ALBUTEROL SULFATE 2.5 MG/.5ML
2.5 SOLUTION RESPIRATORY (INHALATION)
Status: COMPLETED | OUTPATIENT
Start: 2024-08-07 | End: 2024-08-07

## 2024-08-07 RX ADMIN — LEVOTHYROXINE SODIUM 50 MCG: 50 TABLET ORAL at 06:08

## 2024-08-07 RX ADMIN — NITROGLYCERIN 0.4 MG: 0.4 TABLET SUBLINGUAL at 12:08

## 2024-08-07 RX ADMIN — TAMSULOSIN HYDROCHLORIDE 0.4 MG: 0.4 CAPSULE ORAL at 09:08

## 2024-08-07 RX ADMIN — LORAZEPAM 1 MG: 2 INJECTION INTRAMUSCULAR; INTRAVENOUS at 02:08

## 2024-08-07 RX ADMIN — ATORVASTATIN CALCIUM 40 MG: 20 TABLET, FILM COATED ORAL at 11:08

## 2024-08-07 RX ADMIN — SPIRONOLACTONE 50 MG: 25 TABLET, FILM COATED ORAL at 09:08

## 2024-08-07 RX ADMIN — MORPHINE SULFATE 2 MG: 2 INJECTION, SOLUTION INTRAMUSCULAR; INTRAVENOUS at 01:08

## 2024-08-07 RX ADMIN — ALBUTEROL SULFATE 2.5 MG: 2.5 SOLUTION RESPIRATORY (INHALATION) at 01:08

## 2024-08-07 RX ADMIN — ONDANSETRON 4 MG: 2 INJECTION INTRAMUSCULAR; INTRAVENOUS at 02:08

## 2024-08-07 RX ADMIN — TIOTROPIUM BROMIDE INHALATION SPRAY 1 PUFF: 1.56 SPRAY, METERED RESPIRATORY (INHALATION) at 10:08

## 2024-08-07 RX ADMIN — MONTELUKAST 10 MG: 10 TABLET, FILM COATED ORAL at 09:08

## 2024-08-07 RX ADMIN — MORPHINE SULFATE 2 MG: 2 INJECTION, SOLUTION INTRAMUSCULAR; INTRAVENOUS at 12:08

## 2024-08-07 RX ADMIN — IOHEXOL 100 ML: 350 INJECTION, SOLUTION INTRAVENOUS at 03:08

## 2024-08-07 RX ADMIN — METOPROLOL SUCCINATE 25 MG: 25 TABLET, EXTENDED RELEASE ORAL at 09:08

## 2024-08-07 RX ADMIN — FINASTERIDE 5 MG: 5 TABLET, FILM COATED ORAL at 09:08

## 2024-08-07 RX ADMIN — ENOXAPARIN SODIUM 100 MG: 100 INJECTION SUBCUTANEOUS at 04:08

## 2024-08-07 RX ADMIN — OXYCODONE HYDROCHLORIDE AND ACETAMINOPHEN 1 TABLET: 5; 325 TABLET ORAL at 09:08

## 2024-08-07 RX ADMIN — ACETAMINOPHEN 650 MG: 325 TABLET, FILM COATED ORAL at 11:08

## 2024-08-07 RX ADMIN — AMLODIPINE BESYLATE 5 MG: 5 TABLET ORAL at 09:08

## 2024-08-07 RX ADMIN — ENOXAPARIN SODIUM 100 MG: 100 INJECTION SUBCUTANEOUS at 03:08

## 2024-08-07 RX ADMIN — FLUTICASONE FUROATE AND VILANTEROL TRIFENATATE 1 PUFF: 100; 25 POWDER RESPIRATORY (INHALATION) at 10:08

## 2024-08-07 RX ADMIN — FUROSEMIDE 40 MG: 40 TABLET ORAL at 09:08
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
## 2024-08-07 NOTE — ED TRIAGE NOTES
Pt presents to ED via EMS w/ c/o midsternal chest pain that travels to the right side of his chest. Pt reports this began about 20 minutes prior to arrival. Hx of CHF and COPD, on 3L home oxygen. Pt denies any other complaints at this time. AAOx4. NAD noted.

## 2024-08-07 NOTE — ASSESSMENT & PLAN NOTE
Presented with chest tightness, pleuritic pain, elevated D-dimer  Found to have right upper lobar pulmonary artery PE extending to segmental branches.  Treat with full dose Lovenox, transition to apixaban on discharge

## 2024-08-07 NOTE — ASSESSMENT & PLAN NOTE
BP elevated on presentation  Latest blood pressure and vitals reviewed-     Temp:  [98.5 °F (36.9 °C)]   Pulse:  [63-71]   Resp:  [16-24]   BP: (101-159)/()   SpO2:  [91 %-100 %] .   Home meds for hypertension were reviewed and noted below.   Hypertension Medications               amLODIPine (NORVASC) 5 MG tablet Take 5 mg by mouth once daily.    furosemide (LASIX) 40 MG tablet Take 40 mg by mouth once daily.    metoprolol succinate (TOPROL-XL) 25 MG 24 hr tablet Take 25 mg by mouth once daily.    spironolactone (ALDACTONE) 25 MG tablet Take 50 mg by mouth once daily.          Continue home medications  Monitor closely
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
## 2024-08-07 NOTE — HPI
Mr. Reardon is a 64-year-old man who presented with 1 hour of right-sided chest pain that started while he was sitting down watching television.  The pain feels like pressure.  He thought it might be gas and drank a bottle of water but the pain persisted.  He has had similar pain in the past but not as strong and would go away quickly.  Patient is on home oxygen 3 L for COPD that was prescribed after a recent hospitalization at [facility] from 07/22 to 7/24.  On presentation here patient's blood pressure was markedly elevated.  A D-dimer was greater than 18 and a CT angiogram was done demonstrating pulmonary emboli in the distal right upper lobar pulmonary artery extending into several segmental branches of the right upper lobe.  Patient is being admitted for further management of acute pulmonary embolism.    Medical history includes COPD with asthma overlap as noted above.  Patient quit smoking about 30 years ago but still uses cannabis.  Patient moved here recently from [place] and left behind his medications and oxygen, then presented to [facility] on 07/22 with abdominal pain and diarrhea for which he had an extensive workup and was discharged when it resolved.  He failed a 6 minute walk test and was sent home with a new oxygen prescription.

## 2024-08-07 NOTE — ASSESSMENT & PLAN NOTE
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

## 2024-08-07 NOTE — ASSESSMENT & PLAN NOTE
Patient is a former smoker, quit around 2018.  Diagnosed with asthma/COPD overlap syndrome and is on home oxygen 3 liters  Still uses cannabis from time to time  Currently being treated with albuterol as a rescue inhaler, Spiriva and Symbicort  Continue albuterol prn, daily Spiriva and Breo (as interchange for Symbicort)
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
## 2024-08-08 LAB
ALBUMIN SERPL BCP-MCNC: 2.8 G/DL (ref 3.5–5.2)
ALP SERPL-CCNC: 133 U/L (ref 55–135)
ALT SERPL W/O P-5'-P-CCNC: 73 U/L (ref 10–44)
ANION GAP SERPL CALC-SCNC: 6 MMOL/L (ref 8–16)
AST SERPL-CCNC: 39 U/L (ref 10–40)
BASOPHILS # BLD AUTO: 0.04 K/UL (ref 0–0.2)
BASOPHILS NFR BLD: 0.4 % (ref 0–1.9)
BILIRUB SERPL-MCNC: 0.5 MG/DL (ref 0.1–1)
BUN SERPL-MCNC: 18 MG/DL (ref 8–23)
CALCIUM SERPL-MCNC: 9 MG/DL (ref 8.7–10.5)
CHLORIDE SERPL-SCNC: 105 MMOL/L (ref 95–110)
CO2 SERPL-SCNC: 29 MMOL/L (ref 23–29)
CREAT SERPL-MCNC: 1.5 MG/DL (ref 0.5–1.4)
DIFFERENTIAL METHOD BLD: ABNORMAL
EOSINOPHIL # BLD AUTO: 0.1 K/UL (ref 0–0.5)
EOSINOPHIL NFR BLD: 0.8 % (ref 0–8)
ERYTHROCYTE [DISTWIDTH] IN BLOOD BY AUTOMATED COUNT: 14.2 % (ref 11.5–14.5)
EST. GFR  (NO RACE VARIABLE): 51 ML/MIN/1.73 M^2
GLUCOSE SERPL-MCNC: 85 MG/DL (ref 70–110)
HCT VFR BLD AUTO: 38.6 % (ref 40–54)
HGB BLD-MCNC: 12 G/DL (ref 14–18)
IMM GRANULOCYTES # BLD AUTO: 0.08 K/UL (ref 0–0.04)
IMM GRANULOCYTES NFR BLD AUTO: 0.8 % (ref 0–0.5)
LYMPHOCYTES # BLD AUTO: 1.5 K/UL (ref 1–4.8)
LYMPHOCYTES NFR BLD: 14.8 % (ref 18–48)
MAGNESIUM SERPL-MCNC: 2 MG/DL (ref 1.6–2.6)
MCH RBC QN AUTO: 27.3 PG (ref 27–31)
MCHC RBC AUTO-ENTMCNC: 31.1 G/DL (ref 32–36)
MCV RBC AUTO: 88 FL (ref 82–98)
MONOCYTES # BLD AUTO: 1 K/UL (ref 0.3–1)
MONOCYTES NFR BLD: 10.4 % (ref 4–15)
NEUTROPHILS # BLD AUTO: 7.2 K/UL (ref 1.8–7.7)
NEUTROPHILS NFR BLD: 72.8 % (ref 38–73)
NRBC BLD-RTO: 0 /100 WBC
PHOSPHATE SERPL-MCNC: 3.4 MG/DL (ref 2.7–4.5)
PLATELET # BLD AUTO: 176 K/UL (ref 150–450)
PMV BLD AUTO: 9.1 FL (ref 9.2–12.9)
POTASSIUM SERPL-SCNC: 4.1 MMOL/L (ref 3.5–5.1)
PROT SERPL-MCNC: 5.9 G/DL (ref 6–8.4)
RBC # BLD AUTO: 4.4 M/UL (ref 4.6–6.2)
SODIUM SERPL-SCNC: 140 MMOL/L (ref 136–145)
WBC # BLD AUTO: 9.86 K/UL (ref 3.9–12.7)

## 2024-08-08 PROCEDURE — 83735 ASSAY OF MAGNESIUM: CPT | Performed by: NURSE PRACTITIONER

## 2024-08-08 PROCEDURE — 85025 COMPLETE CBC W/AUTO DIFF WBC: CPT | Performed by: NURSE PRACTITIONER

## 2024-08-08 PROCEDURE — 94640 AIRWAY INHALATION TREATMENT: CPT

## 2024-08-08 PROCEDURE — 25000003 PHARM REV CODE 250: Performed by: NURSE PRACTITIONER

## 2024-08-08 PROCEDURE — 11000001 HC ACUTE MED/SURG PRIVATE ROOM

## 2024-08-08 PROCEDURE — 25000242 PHARM REV CODE 250 ALT 637 W/ HCPCS: Performed by: HOSPITALIST

## 2024-08-08 PROCEDURE — 84100 ASSAY OF PHOSPHORUS: CPT | Performed by: NURSE PRACTITIONER

## 2024-08-08 PROCEDURE — 27000221 HC OXYGEN, UP TO 24 HOURS

## 2024-08-08 PROCEDURE — 99900035 HC TECH TIME PER 15 MIN (STAT)

## 2024-08-08 PROCEDURE — 80053 COMPREHEN METABOLIC PANEL: CPT | Performed by: NURSE PRACTITIONER

## 2024-08-08 PROCEDURE — 25000242 PHARM REV CODE 250 ALT 637 W/ HCPCS: Performed by: NURSE PRACTITIONER

## 2024-08-08 PROCEDURE — 25000003 PHARM REV CODE 250: Performed by: HOSPITALIST

## 2024-08-08 PROCEDURE — 63600175 PHARM REV CODE 636 W HCPCS: Performed by: NURSE PRACTITIONER

## 2024-08-08 PROCEDURE — 36415 COLL VENOUS BLD VENIPUNCTURE: CPT | Performed by: NURSE PRACTITIONER

## 2024-08-08 PROCEDURE — 94761 N-INVAS EAR/PLS OXIMETRY MLT: CPT

## 2024-08-08 RX ORDER — TRAMADOL HYDROCHLORIDE 50 MG/1
50 TABLET ORAL EVERY 6 HOURS PRN
Status: DISCONTINUED | OUTPATIENT
Start: 2024-08-08 | End: 2024-08-09 | Stop reason: HOSPADM

## 2024-08-08 RX ADMIN — SPIRONOLACTONE 50 MG: 25 TABLET, FILM COATED ORAL at 08:08

## 2024-08-08 RX ADMIN — AMLODIPINE BESYLATE 5 MG: 5 TABLET ORAL at 08:08

## 2024-08-08 RX ADMIN — TAMSULOSIN HYDROCHLORIDE 0.8 MG: 0.4 CAPSULE ORAL at 08:08

## 2024-08-08 RX ADMIN — ALBUTEROL SULFATE 2.5 MG: 2.5 SOLUTION RESPIRATORY (INHALATION) at 07:08

## 2024-08-08 RX ADMIN — FUROSEMIDE 40 MG: 40 TABLET ORAL at 08:08

## 2024-08-08 RX ADMIN — LEVOTHYROXINE SODIUM 50 MCG: 50 TABLET ORAL at 05:08

## 2024-08-08 RX ADMIN — TIOTROPIUM BROMIDE INHALATION SPRAY 1 PUFF: 1.56 SPRAY, METERED RESPIRATORY (INHALATION) at 11:08

## 2024-08-08 RX ADMIN — FINASTERIDE 5 MG: 5 TABLET, FILM COATED ORAL at 08:08

## 2024-08-08 RX ADMIN — ATORVASTATIN CALCIUM 40 MG: 20 TABLET, FILM COATED ORAL at 08:08

## 2024-08-08 RX ADMIN — FLUTICASONE FUROATE AND VILANTEROL TRIFENATATE 1 PUFF: 100; 25 POWDER RESPIRATORY (INHALATION) at 07:08

## 2024-08-08 RX ADMIN — ENOXAPARIN SODIUM 100 MG: 100 INJECTION SUBCUTANEOUS at 03:08

## 2024-08-08 RX ADMIN — METOPROLOL SUCCINATE 25 MG: 25 TABLET, EXTENDED RELEASE ORAL at 08:08

## 2024-08-08 NOTE — PROGRESS NOTES
[facility] Surg [address] Medicine  Progress Note    Patient Name: Robinson Reardon Sr.  MRN: [id number]  Patient Class: IP- Inpatient   Admission Date: 8/6/2024  Length of Stay: 1 days  Attending Physician: Odilia Salazar MD  Primary Care Provider: [facility]        Subjective:     Principal Problem:Acute pulmonary embolism without acute cor pulmonale        HPI:  Mr. Reardon is a 64-year-old man who presented with 1 hour of right-sided chest pain that started while he was sitting down watching television.  The pain feels like pressure.  He thought it might be gas and drank a bottle of water but the pain persisted.  He has had similar pain in the past but not as strong and would go away quickly.  Patient is on home oxygen 3 L for COPD that was prescribed after a recent hospitalization at [facility] from 07/22 to 7/24.  On presentation here patient's blood pressure was markedly elevated.  A D-dimer was greater than 18 and a CT angiogram was done demonstrating pulmonary emboli in the distal right upper lobar pulmonary artery extending into several segmental branches of the right upper lobe.  Patient is being admitted for further management of acute pulmonary embolism.    Medical history includes COPD with asthma overlap as noted above.  Patient quit smoking about 30 years ago but still uses cannabis.  Patient moved here recently from [place] and left behind his medications and oxygen, then presented to [facility] on 07/22 with abdominal pain and diarrhea for which he had an extensive workup and was discharged when it resolved.  He failed a 6 minute walk test and was sent home with a new oxygen prescription.      Overview/Hospital Course:  Patient was admitted on full dose Lovenox to treat acute PE and was continued on his home oxygen.    Interval History: He feels much better today, chest pain resolved and breathing more comfortably.  Asking about chronic back pain and getting tramadol for  it instead of oxycodone.  Would also like to get this outpatient.  Wife at bedside.    Review of Systems   Constitutional:  Negative for chills and fever.   Respiratory:  Negative for cough and shortness of breath.    Cardiovascular:  Negative for chest pain and palpitations.   Musculoskeletal:  Positive for arthralgias and back pain.     Objective:     Vital Signs (Most Recent):  Temp: 97.7 °F (36.5 °C) (08/08/24 1202)  Pulse: 67 (08/08/24 1202)  Resp: 18 (08/08/24 1202)  BP: 113/75 (08/08/24 1202)  SpO2: 99 % (08/08/24 1202) Vital Signs (24h Range):  Temp:  [97.5 °F (36.4 °C)-98.7 °F (37.1 °C)] 97.7 °F (36.5 °C)  Pulse:  [56-74] 67  Resp:  [18-20] 18  SpO2:  [90 %-99 %] 99 %  BP: (105-130)/(73-80) 113/75     Weight: 100.2 kg (221 lb)  Body mass index is 30.82 kg/m².    Intake/Output Summary (Last 24 hours) at 8/8/2024 1811  Last data filed at 8/8/2024 1558  Gross per 24 hour   Intake 1070 ml   Output 1925 ml   Net -855 ml         Physical Exam  Cardiovascular:      Rate and Rhythm: Normal rate and regular rhythm.      Pulses: Normal pulses.      Heart sounds: Normal heart sounds. No murmur heard.     No gallop.   Pulmonary:      Effort: Pulmonary effort is normal.      Comments: Breath sounds decreased  Abdominal:      General: Bowel sounds are normal.      Palpations: Abdomen is soft.   Skin:     General: Skin is warm and dry.   Neurological:      General: No focal deficit present.      Mental Status: He is alert and oriented to person, place, and time.             Significant Labs: All pertinent labs within the past 24 hours have been reviewed.    Significant Imaging: I have reviewed all pertinent imaging results/findings within the past 24 hours.    Assessment/Plan:      * Acute pulmonary embolism without acute cor pulmonale  Presented with chest tightness, pleuritic pain, elevated D-dimer  Found to have right upper lobar pulmonary artery PE extending to segmental branches.  Treat with full dose Lovenox,  transition to apixaban on discharge      Primary hypertension  BP elevated on presentation  Latest blood pressure and vitals reviewed-     Temp:  [98.5 °F (36.9 °C)]   Pulse:  [63-71]   Resp:  [16-24]   BP: (101-159)/()   SpO2:  [91 %-100 %] .   Home meds for hypertension were reviewed and noted below.   Hypertension Medications               amLODIPine (NORVASC) 5 MG tablet Take 5 mg by mouth once daily.    furosemide (LASIX) 40 MG tablet Take 40 mg by mouth once daily.    metoprolol succinate (TOPROL-XL) 25 MG 24 hr tablet Take 25 mg by mouth once daily.    spironolactone (ALDACTONE) 25 MG tablet Take 50 mg by mouth once daily.          Continue home medications  Monitor closely    COPD (chronic obstructive pulmonary disease)  Patient is a former smoker, quit around 2018.  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        VTE Risk Mitigation (From admission, onward)           Ordered     apixaban tablet 10 mg  2 times daily         08/08/24 1812     IP VTE HIGH RISK PATIENT  Once         08/07/24 0519     Place sequential compression device  Until discontinued         08/07/24 0519                    Discharge Planning   JUJU: 8/10/2024     Code Status: Full Code   Is the patient medically ready for discharge?:     Reason for patient still in hospital (select all that apply): Patient trending condition and Treatment  Discharge Plan A: Home with family                  Odilia Nair MD  Department of Hospital Medicine   [facility] - Med Surg ([address])

## 2024-08-08 NOTE — SUBJECTIVE & OBJECTIVE
Interval History: He feels much better today, chest pain resolved and breathing more comfortably.  Asking about chronic back pain and getting tramadol for it instead of oxycodone.  Would also like to get this outpatient.  Wife at bedside.    Review of Systems   Constitutional:  Negative for chills and fever.   Respiratory:  Negative for cough and shortness of breath.    Cardiovascular:  Negative for chest pain and palpitations.   Musculoskeletal:  Positive for arthralgias and back pain.     Objective:     Vital Signs (Most Recent):  Temp: 97.7 °F (36.5 °C) (08/08/24 1202)  Pulse: 67 (08/08/24 1202)  Resp: 18 (08/08/24 1202)  BP: 113/75 (08/08/24 1202)  SpO2: 99 % (08/08/24 1202) Vital Signs (24h Range):  Temp:  [97.5 °F (36.4 °C)-98.7 °F (37.1 °C)] 97.7 °F (36.5 °C)  Pulse:  [56-74] 67  Resp:  [18-20] 18  SpO2:  [90 %-99 %] 99 %  BP: (105-130)/(73-80) 113/75     Weight: 100.2 kg (221 lb)  Body mass index is 30.82 kg/m².    Intake/Output Summary (Last 24 hours) at 8/8/2024 1811  Last data filed at 8/8/2024 1558  Gross per 24 hour   Intake 1070 ml   Output 1925 ml   Net -855 ml         Physical Exam  Cardiovascular:      Rate and Rhythm: Normal rate and regular rhythm.      Pulses: Normal pulses.      Heart sounds: Normal heart sounds. No murmur heard.     No gallop.   Pulmonary:      Effort: Pulmonary effort is normal.      Comments: Breath sounds decreased  Abdominal:      General: Bowel sounds are normal.      Palpations: Abdomen is soft.   Skin:     General: Skin is warm and dry.   Neurological:      General: No focal deficit present.      Mental Status: He is alert and oriented to person, place, and time.             Significant Labs: All pertinent labs within the past 24 hours have been reviewed.    Significant Imaging: I have reviewed all pertinent imaging results/findings within the past 24 hours.

## 2024-08-08 NOTE — PLAN OF CARE
MSW met with the patient at the bedside.     Patient is alert and oriented with no communication barriers.     Patient has a Cane & O2  in the home.     Patients PCP is correct on the face sheet. Patient choice pharmacy is bedside delivery.     Patient will need transportation home at discharge.     CM team will continue to follow.      08/08/24 1058   Discharge Assessment   Assessment Type Discharge Planning Assessment   Confirmed/corrected address, phone number and insurance No  ([id number] [facility] [place] [id number])   Confirmed Demographics Correct on Facesheet   Source of Information patient;health record   People in Home other relative(s)   Do you expect to return to your current living situation? Yes   Do you have help at home or someone to help you manage your care at home? Yes   Who are your caregiver(s) and their phone number(s)? Family   Prior to hospitilization cognitive status: Alert/Oriented   Current cognitive status: Alert/Oriented   Walking or Climbing Stairs Difficulty no   Dressing/Bathing Difficulty no   Equipment Currently Used at Home oxygen;cane, straight   Readmission within 30 days? Yes  (2 weeks ago at [facility])   Do you currently have service(s) that help you manage your care at home? No   Do you take prescription medications? Yes   Do you have prescription coverage? Yes   Do you have any problems affording any of your prescribed medications? No   How do you get to doctors appointments? car, drives self   Are you on dialysis? No   Do you take coumadin? No   Discharge Plan A Home with family   Discharge Plan B Home Health   Discharge Plan discussed with: Patient   Transition of Care Barriers None   Physical Activity   On average, how many days per week do you engage in moderate to strenuous exercise (like a brisk walk)? 5 days   On average, how many minutes do you engage in exercise at this level? 40 min   Financial Resource Strain   How hard is it for you to pay for the very basics like food, housing,  medical care, and heating? Somewhat   Housing Stability   In the last 12 months, was there a time when you were not able to pay the mortgage or rent on time? Y   At any time in the past 12 months, were you homeless or living in a shelter (including now)? N   Transportation Needs   Has the lack of transportation kept you from medical appointments, meetings, work or from getting things needed for daily living? Yes, it has kept me from medical appointments or from getting my medications.   Food Insecurity   Within the past 12 months, you worried that your food would run out before you got the money to buy more. Sometimes   Within the past 12 months, the food you bought just didn't last and you didn't have money to get more. Sometimes   Stress   Do you feel stress - tense, restless, nervous, or anxious, or unable to sleep at night because your mind is troubled all the time - these days? To some exte   Social Isolation   How often do you feel lonely or isolated from those around you?  Never   Alcohol Use   Q1: How often do you have a drink containing alcohol? Never   Q2: How many drinks containing alcohol do you have on a typical day when you are drinking? None   Q3: How often do you have six or more drinks on one occasion? Never   Utilities   In the past 12 months has the electric, gas, oil, or water company threatened to shut off services in your home? No   Health Literacy   How often do you need to have someone help you when you read instructions, pamphlets, or other written material from your doctor or pharmacy? Never

## 2024-08-08 NOTE — HOSPITAL COURSE
Patient was admitted on full dose Lovenox to treat acute PE and was continued on his home oxygen.  He had good improvement in his chest pain and blood pressure.  He requested tramadol for chronic back pain and plans to follow up for pain management at [place].  Patient was prescribed the first month's dose pack of apixaban on discharge and will follow up with his PCP Monday 8/12/24 for further management and any refills needed.  He was seen and examined on the day of discharge.

## 2024-08-09 ENCOUNTER — NURSE TRIAGE (OUTPATIENT)
Dept: ADMINISTRATIVE | Facility: CLINIC | Age: 64
End: 2024-08-09
Payer: MEDICARE

## 2024-08-09 VITALS
DIASTOLIC BLOOD PRESSURE: 73 MMHG | SYSTOLIC BLOOD PRESSURE: 106 MMHG | OXYGEN SATURATION: 99 % | RESPIRATION RATE: 23 BRPM | TEMPERATURE: 98 F | HEART RATE: 65 BPM | WEIGHT: 246.69 LBS | BODY MASS INDEX: 34.54 KG/M2 | HEIGHT: 71 IN

## 2024-08-09 LAB
ALBUMIN SERPL BCP-MCNC: 3.4 G/DL (ref 3.5–5.2)
ALP SERPL-CCNC: 137 U/L (ref 55–135)
ALT SERPL W/O P-5'-P-CCNC: 62 U/L (ref 10–44)
ANION GAP SERPL CALC-SCNC: 10 MMOL/L (ref 8–16)
AST SERPL-CCNC: 30 U/L (ref 10–40)
BASOPHILS # BLD AUTO: 0.03 K/UL (ref 0–0.2)
BASOPHILS NFR BLD: 0.3 % (ref 0–1.9)
BILIRUB SERPL-MCNC: 0.7 MG/DL (ref 0.1–1)
BUN SERPL-MCNC: 17 MG/DL (ref 8–23)
CALCIUM SERPL-MCNC: 10 MG/DL (ref 8.7–10.5)
CHLORIDE SERPL-SCNC: 101 MMOL/L (ref 95–110)
CO2 SERPL-SCNC: 26 MMOL/L (ref 23–29)
CREAT SERPL-MCNC: 1.6 MG/DL (ref 0.5–1.4)
DIFFERENTIAL METHOD BLD: ABNORMAL
EOSINOPHIL # BLD AUTO: 0.1 K/UL (ref 0–0.5)
EOSINOPHIL NFR BLD: 1 % (ref 0–8)
ERYTHROCYTE [DISTWIDTH] IN BLOOD BY AUTOMATED COUNT: 14.2 % (ref 11.5–14.5)
EST. GFR  (NO RACE VARIABLE): 47 ML/MIN/1.73 M^2
GLUCOSE SERPL-MCNC: 86 MG/DL (ref 70–110)
HCT VFR BLD AUTO: 45.1 % (ref 40–54)
HGB BLD-MCNC: 13.5 G/DL (ref 14–18)
IMM GRANULOCYTES # BLD AUTO: 0.07 K/UL (ref 0–0.04)
IMM GRANULOCYTES NFR BLD AUTO: 0.7 % (ref 0–0.5)
LYMPHOCYTES # BLD AUTO: 1.8 K/UL (ref 1–4.8)
LYMPHOCYTES NFR BLD: 18.6 % (ref 18–48)
MAGNESIUM SERPL-MCNC: 2 MG/DL (ref 1.6–2.6)
MCH RBC QN AUTO: 26.4 PG (ref 27–31)
MCHC RBC AUTO-ENTMCNC: 29.9 G/DL (ref 32–36)
MCV RBC AUTO: 88 FL (ref 82–98)
MONOCYTES # BLD AUTO: 1 K/UL (ref 0.3–1)
MONOCYTES NFR BLD: 10.4 % (ref 4–15)
NEUTROPHILS # BLD AUTO: 6.8 K/UL (ref 1.8–7.7)
NEUTROPHILS NFR BLD: 69 % (ref 38–73)
NRBC BLD-RTO: 0 /100 WBC
PHOSPHATE SERPL-MCNC: 2.9 MG/DL (ref 2.7–4.5)
PLATELET # BLD AUTO: 204 K/UL (ref 150–450)
PMV BLD AUTO: 9.2 FL (ref 9.2–12.9)
POTASSIUM SERPL-SCNC: 3.8 MMOL/L (ref 3.5–5.1)
PROT SERPL-MCNC: 7.3 G/DL (ref 6–8.4)
RBC # BLD AUTO: 5.11 M/UL (ref 4.6–6.2)
SODIUM SERPL-SCNC: 137 MMOL/L (ref 136–145)
WBC # BLD AUTO: 9.89 K/UL (ref 3.9–12.7)

## 2024-08-09 PROCEDURE — 99900035 HC TECH TIME PER 15 MIN (STAT)

## 2024-08-09 PROCEDURE — 27000221 HC OXYGEN, UP TO 24 HOURS

## 2024-08-09 PROCEDURE — 25000003 PHARM REV CODE 250: Performed by: NURSE PRACTITIONER

## 2024-08-09 PROCEDURE — 25000003 PHARM REV CODE 250: Performed by: HOSPITALIST

## 2024-08-09 PROCEDURE — 83735 ASSAY OF MAGNESIUM: CPT | Performed by: NURSE PRACTITIONER

## 2024-08-09 PROCEDURE — 80053 COMPREHEN METABOLIC PANEL: CPT | Performed by: NURSE PRACTITIONER

## 2024-08-09 PROCEDURE — 85025 COMPLETE CBC W/AUTO DIFF WBC: CPT | Performed by: NURSE PRACTITIONER

## 2024-08-09 PROCEDURE — 84100 ASSAY OF PHOSPHORUS: CPT | Performed by: NURSE PRACTITIONER

## 2024-08-09 PROCEDURE — 94640 AIRWAY INHALATION TREATMENT: CPT

## 2024-08-09 PROCEDURE — 36415 COLL VENOUS BLD VENIPUNCTURE: CPT | Performed by: NURSE PRACTITIONER

## 2024-08-09 PROCEDURE — 94618 PULMONARY STRESS TESTING: CPT

## 2024-08-09 RX ORDER — SPIRONOLACTONE 25 MG/1
25 TABLET ORAL DAILY
Status: DISCONTINUED | OUTPATIENT
Start: 2024-08-09 | End: 2024-08-09 | Stop reason: HOSPADM

## 2024-08-09 RX ORDER — TRAMADOL HYDROCHLORIDE 50 MG/1
50 TABLET ORAL EVERY 12 HOURS PRN
Qty: 14 TABLET | Refills: 0 | Status: SHIPPED | OUTPATIENT
Start: 2024-08-09

## 2024-08-09 RX ADMIN — ATORVASTATIN CALCIUM 40 MG: 20 TABLET, FILM COATED ORAL at 08:08

## 2024-08-09 RX ADMIN — APIXABAN 10 MG: 2.5 TABLET, FILM COATED ORAL at 08:08

## 2024-08-09 RX ADMIN — AMLODIPINE BESYLATE 5 MG: 5 TABLET ORAL at 08:08

## 2024-08-09 RX ADMIN — LEVOTHYROXINE SODIUM 50 MCG: 50 TABLET ORAL at 05:08

## 2024-08-09 RX ADMIN — FUROSEMIDE 40 MG: 40 TABLET ORAL at 08:08

## 2024-08-09 RX ADMIN — TIOTROPIUM BROMIDE INHALATION SPRAY 1 PUFF: 1.56 SPRAY, METERED RESPIRATORY (INHALATION) at 07:08

## 2024-08-09 RX ADMIN — FINASTERIDE 5 MG: 5 TABLET, FILM COATED ORAL at 08:08

## 2024-08-09 RX ADMIN — FLUTICASONE FUROATE AND VILANTEROL TRIFENATATE 1 PUFF: 100; 25 POWDER RESPIRATORY (INHALATION) at 07:08

## 2024-08-09 RX ADMIN — METOPROLOL SUCCINATE 25 MG: 25 TABLET, EXTENDED RELEASE ORAL at 08:08

## 2024-08-09 RX ADMIN — SPIRONOLACTONE 25 MG: 25 TABLET, FILM COATED ORAL at 08:08

## 2024-08-09 RX ADMIN — TAMSULOSIN HYDROCHLORIDE 0.8 MG: 0.4 CAPSULE ORAL at 08:08

## 2024-08-09 NOTE — PLAN OF CARE
Problem: Adult Inpatient Plan of Care  Goal: Plan of Care Review  Outcome: Progressing  Goal: Patient-Specific Goal (Individualized)  Outcome: Progressing  Goal: Absence of Hospital-Acquired Illness or Injury  Outcome: Progressing  Goal: Optimal Comfort and Wellbeing  Outcome: Progressing     POC reviewed with patient. All questions and concerns addressed. Fall/safety precautions implemented and maintained. Urinal provided and within reach. No acute events noted this shift. Please see flowsheet for full assessment and vitals. Bed locked in lowest position. Side rails up x2. Call bell within reach.

## 2024-08-09 NOTE — PLAN OF CARE
Patient will discharge home. Appointments already scheduled and added to AVS. Patient friend to provide transportation home. All CM needs have been met.    08/09/24 1258   Final Note   Assessment Type Final Discharge Note   Anticipated Discharge Disposition Home   Hospital Resources/Appts/Education Provided Provided patient/caregiver with written discharge plan information;Appointments scheduled and added to AVS   Post-Acute Status   Discharge Delays None known at this time     [facility] - Med Surg ([address])  Discharge Final Note    [facility] Provider: [facility] -     Expected Discharge Date: 8/9/2024    Final Discharge Note (most recent)       Final Note - 08/09/24 1258          Final Note    Assessment Type Final Discharge Note (P)      Anticipated Discharge Disposition Home or Self Care (P)      Hospital Resources/Appts/Education Provided Provided patient/caregiver with written discharge plan information;Appointments scheduled and added to AVS (P)         Post-Acute Status    Discharge Delays None known at this time (P)                      Important Message from Medicare             Contact Info       [facility]  [place]   Relationship: PCP - General    [address] [facility] [address]   Phone: [phone]       Next Steps: Follow up    Instructions: As scheduled with Dr. Dumont on Monday 8/12/2024

## 2024-08-09 NOTE — DISCHARGE SUMMARY
[facility] Med Surg [address] Medicine  Discharge Summary      Patient Name: Robinson Reardon Sr.  MRN: [id number]  [facility]: [id number]  Patient Class: IP- Inpatient  Admission Date: 8/6/2024  Hospital Length of Stay: 2 days  Discharge Date and Time:  08/09/2024 9:35 AM  Attending Physician: Odilia Salazar MD   Discharging Provider: Odilia Salazar MD  Primary Care Provider: [facility] [place]    Primary Care Team: Networked reference to record PCT     HPI:   Mr. Reardon is a 64-year-old man who presented with 1 hour of right-sided chest pain that started while he was sitting down watching television.  The pain feels like pressure.  He thought it might be gas and drank a bottle of water but the pain persisted.  He has had similar pain in the past but not as strong and would go away quickly.  Patient is on home oxygen 3 L for COPD that was prescribed after a recent hospitalization at [facility] from 07/22 to 7/24.  On presentation here patient's blood pressure was markedly elevated.  A D-dimer was greater than 18 and a CT angiogram was done demonstrating pulmonary emboli in the distal right upper lobar pulmonary artery extending into several segmental branches of the right upper lobe.  Patient is being admitted for further management of acute pulmonary embolism.    Medical history includes COPD with asthma overlap as noted above.  Patient quit smoking about 30 years ago but still uses cannabis.  Patient moved here recently from [place] and left behind his medications and oxygen, then presented to [facility] on 07/22 with abdominal pain and diarrhea for which he had an extensive workup and was discharged when it resolved.  He failed a 6 minute walk test and was sent home with a new oxygen prescription.            Hospital Course:   Patient was admitted on full dose Lovenox to treat acute PE and was continued on his home oxygen.  He had good improvement in his chest pain and blood pressure.  He requested  tramadol for chronic back pain and plans to follow up for pain management at [place].  Patient was prescribed the first month's dose pack of apixaban on discharge and will follow up with his PCP Monday 8/12/24 for further management and any refills needed.  He was seen and examined on the day of discharge.     Goals of Care Treatment Preferences:  Code Status: Full Code         Final Active Diagnoses:    Diagnosis Date Noted POA    PRINCIPAL PROBLEM:  Acute pulmonary embolism without acute cor pulmonale [I26.99] 08/07/2024 Yes    Primary hypertension [I10] 08/07/2024 Yes    Liver lesions [K76.9] 02/17/2022 Yes    COPD (chronic obstructive pulmonary disease) [J44.9] 02/17/2022 Yes      Problems Resolved During this Admission:       Discharged Condition: stable    Disposition: Home or Self Care    Follow Up:   Follow-up Information       [facility] Follow up.    Why: As scheduled with Dr. Dumont on Monday 8/12/2024  Contact information:  [address] [facility] [address]  [phone]                           Patient Instructions:      Diet Adult Regular     Activity as tolerated         Medications:  Reconciled Home Medications:      Medication List        START taking these medications      apixaban 5 mg (74 tabs) Dspk  Commonly known as: ELIQUIS  For the first 7 days take two 5 mg tablets twice daily.  After 7 days take one 5 mg tablet twice daily.     traMADoL 50 mg tablet  Commonly known as: ULTRAM  Take 1 tablet (50 mg total) by mouth every 12 (twelve) hours as needed for Pain.            CONTINUE taking these medications      * albuterol 90 mcg/actuation inhaler  Commonly known as: PROVENTIL/VENTOLIN HFA  Inhale 2 puffs into the lungs every 4 (four) hours as needed for Wheezing or Shortness of Breath. Rescue     * albuterol 1.25 mg/3 mL Nebu  Commonly known as: ACCUNEB  TAKE 3MLS BY NUBULIZATION EVERY 6 HOURS AS NEEDED FOR WHEEZING     amLODIPine 5 MG tablet  Commonly known as:  NORVASC  Take 5 mg by mouth once daily.     atorvastatin 40 MG tablet  Commonly known as: LIPITOR  Take 40 mg by mouth once daily.     budesonide-formoterol 80-4.5 mcg 80-4.5 mcg/actuation Hfaa  Commonly known as: SYMBICORT  Inhale 2 puffs into the lungs 2 (two) times a day. Controller     diclofenac sodium 1 % Gel  Commonly known as: VOLTAREN  Apply topically.     finasteride 5 mg tablet  Commonly known as: PROSCAR  Take 5 mg by mouth once daily.     furosemide 40 MG tablet  Commonly known as: LASIX  Take 40 mg by mouth once daily.     levocetirizine 5 MG tablet  Commonly known as: XYZAL  Take 5 mg by mouth every evening.     levothyroxine 50 MCG tablet  Commonly known as: SYNTHROID  Take 50 mcg by mouth before breakfast.     methocarbamoL 500 MG Tab  Commonly known as: ROBAXIN  Take 750 mg by mouth 3 (three) times daily.     metoprolol succinate 25 MG 24 hr tablet  Commonly known as: TOPROL-XL  Take 25 mg by mouth once daily.     montelukast 10 mg tablet  Commonly known as: SINGULAIR  Take 10 mg by mouth every evening.     spironolactone 25 MG tablet  Commonly known as: ALDACTONE  Take 25 mg by mouth once daily.     tamsulosin 0.4 mg Cap  Commonly known as: FLOMAX  Take 0.8 mg by mouth once daily.     tiotropium bromide 1.25 mcg/actuation inhaler  Commonly known as: SPIRIVA RESPIMAT  Inhale 1 puff into the lungs once daily. Controller     VITAMIN B-12 1000 MCG tablet  Generic drug: cyanocobalamin  Take 1,000 mcg by mouth once daily.           * This list has 2 medication(s) that are the same as other medications prescribed for you. Read the directions carefully, and ask your doctor or other care provider to review them with you.                  Time spent on the discharge of patient: >30 minutes         Odilia Nair MD  Department of Hospital Medicine  [facility] ([address])

## 2024-08-09 NOTE — PROGRESS NOTES
Patient SpO2 on room air at rest 94%, HR 89    Patient SpO2 on room air during 6 min walk , lowest 90%, HR 90 Highest 93%, HR 90, no short of breath.

## 2024-08-09 NOTE — PLAN OF CARE
Free from falls, injury, or skin breakdown this hospital admission. Pt eager & in agreement w/ DC. VU of DC instructions--paperwork & pain prescription passed & explained. Scripts filled and delivered to bedside. IV removed w/ cath tip intact, WNL. Voiding, ambulating, & tolerating PO well. To be DCd home w/ family--will be escorted downstairs via  transport team once dressed, ready & ride arrives. Pt discharged in no distress.

## 2024-08-13 ENCOUNTER — NURSE TRIAGE (OUTPATIENT)
Dept: ADMINISTRATIVE | Facility: CLINIC | Age: 64
End: 2024-08-13
Payer: MEDICARE

## 2024-08-13 NOTE — TELEPHONE ENCOUNTER
OOC Rn  Patient has history of blood clot right side lung.   Today the pain on left sided for about an hour,  no sob,  5/10   Pain   Patient stated that I am not talking to him like an adult.  I tried to explain that I have to asked the question as they are written for his safety.   thEre is a woment is the background screaming at patient and me that I am not talking to him right. And they disconnected call   According to Mariluz DARLING supervisor advised for me to call the patient back and apologize for our encoun ter and finish the triage which the dispo was to call 911   and I apologize for our encounter not going well the first time.   encounter. [place] protocol and advised him to call 911 Patient is low talking No women there States he will call 911 now.     Reason for Disposition   Angry or rude caller and doesn't respond to 5 minutes of triager counseling and sick adult (or caller)   Chest pain lasting longer than 5 minutes, over 30 years old, and at least one cardiac risk factor (e.g., diabetes mellitus, high blood pressure, high cholesterol, smoker, or strong family history of heart disease)    Additional Information   Negative: SEVERE difficulty breathing (e.g., struggling for each breath, speaks in single words)   Negative: Difficult to awaken or acting confused (e.g., disoriented, slurred speech)   Negative: Shock suspected (e.g., cold/pale/clammy skin, too weak to stand, low BP, rapid pulse)   Negative: Passed out (i.e., lost consciousness, collapsed and was not responding)   Negative: Chest pain lasting longer than 5 minutes and over 44 years old   Negative: Violent behavior, or threatening to physically hurt or kill someone   Negative: Caller is very confused and no other adult (e.g., friend or family member) available   Negative: Sounds like a life-threatening emergency to the triager   Negative: Child abuse suspected   Negative: Elder or vulnerable adult abuse suspected   Negative: Suicide thoughts,  threats, attempts, or questions   Negative: Patient sounds very sick or weak to the triager   Negative: [place] worried caller and second call within 4 hours about the same medical problem   Negative: [place] worried caller and third call within 48 hours about the same medical problem   Negative: [place] worried caller and can't be reassured by triager   Negative: Caller demands to speak with the PCP and about sick adult (or sick caller)    Protocols used: Chest Pain-A-OH, Difficult Call-A-OH

## 2024-08-14 ENCOUNTER — HOSPITAL ENCOUNTER (EMERGENCY)
Facility: OTHER | Age: 64
Discharge: HOME | End: 2024-08-14
Attending: EMERGENCY MEDICINE
Payer: MEDICARE

## 2024-08-14 VITALS
HEART RATE: 56 BPM | WEIGHT: 259 LBS | RESPIRATION RATE: 14 BRPM | HEIGHT: 71 IN | TEMPERATURE: 98 F | SYSTOLIC BLOOD PRESSURE: 142 MMHG | BODY MASS INDEX: 36.26 KG/M2 | OXYGEN SATURATION: 96 % | DIASTOLIC BLOOD PRESSURE: 84 MMHG

## 2024-08-14 DIAGNOSIS — R07.89 ATYPICAL CHEST PAIN: Primary | ICD-10-CM

## 2024-08-14 DIAGNOSIS — I10 HYPERTENSION, UNSPECIFIED TYPE: ICD-10-CM

## 2024-08-14 DIAGNOSIS — R07.9 CHEST PAIN: ICD-10-CM

## 2024-08-14 LAB
ALBUMIN SERPL BCP-MCNC: 3.3 G/DL (ref 3.5–5.2)
ALP SERPL-CCNC: 89 U/L (ref 55–135)
ALT SERPL W/O P-5'-P-CCNC: 25 U/L (ref 10–44)
ANION GAP SERPL CALC-SCNC: 10 MMOL/L (ref 8–16)
APTT PPP: 34.6 SEC (ref 21–32)
AST SERPL-CCNC: 19 U/L (ref 10–40)
BASOPHILS # BLD AUTO: 0.05 K/UL (ref 0–0.2)
BASOPHILS NFR BLD: 0.6 % (ref 0–1.9)
BILIRUB SERPL-MCNC: 0.4 MG/DL (ref 0.1–1)
BNP SERPL-MCNC: 13 PG/ML (ref 0–99)
BUN SERPL-MCNC: 12 MG/DL (ref 8–23)
CALCIUM SERPL-MCNC: 9.1 MG/DL (ref 8.7–10.5)
CHLORIDE SERPL-SCNC: 107 MMOL/L (ref 95–110)
CO2 SERPL-SCNC: 23 MMOL/L (ref 23–29)
CREAT SERPL-MCNC: 1.5 MG/DL (ref 0.5–1.4)
DIFFERENTIAL METHOD BLD: ABNORMAL
EOSINOPHIL # BLD AUTO: 0.1 K/UL (ref 0–0.5)
EOSINOPHIL NFR BLD: 0.6 % (ref 0–8)
ERYTHROCYTE [DISTWIDTH] IN BLOOD BY AUTOMATED COUNT: 14.5 % (ref 11.5–14.5)
EST. GFR  (NO RACE VARIABLE): 51 ML/MIN/1.73 M^2
GLUCOSE SERPL-MCNC: 104 MG/DL (ref 70–110)
HCT VFR BLD AUTO: 39.2 % (ref 40–54)
HGB BLD-MCNC: 12.3 G/DL (ref 14–18)
IMM GRANULOCYTES # BLD AUTO: 0.06 K/UL (ref 0–0.04)
IMM GRANULOCYTES NFR BLD AUTO: 0.7 % (ref 0–0.5)
INR PPP: 1 (ref 0.8–1.2)
LYMPHOCYTES # BLD AUTO: 1.2 K/UL (ref 1–4.8)
LYMPHOCYTES NFR BLD: 14.3 % (ref 18–48)
MCH RBC QN AUTO: 27.2 PG (ref 27–31)
MCHC RBC AUTO-ENTMCNC: 31.4 G/DL (ref 32–36)
MCV RBC AUTO: 87 FL (ref 82–98)
MONOCYTES # BLD AUTO: 0.6 K/UL (ref 0.3–1)
MONOCYTES NFR BLD: 6.9 % (ref 4–15)
NEUTROPHILS # BLD AUTO: 6.4 K/UL (ref 1.8–7.7)
NEUTROPHILS NFR BLD: 76.9 % (ref 38–73)
NRBC BLD-RTO: 0 /100 WBC
PLATELET # BLD AUTO: 222 K/UL (ref 150–450)
PMV BLD AUTO: 9 FL (ref 9.2–12.9)
POTASSIUM SERPL-SCNC: 3.5 MMOL/L (ref 3.5–5.1)
PROT SERPL-MCNC: 6.8 G/DL (ref 6–8.4)
PROTHROMBIN TIME: 11.1 SEC (ref 9–12.5)
RBC # BLD AUTO: 4.53 M/UL (ref 4.6–6.2)
SODIUM SERPL-SCNC: 140 MMOL/L (ref 136–145)
TROPONIN I SERPL DL<=0.01 NG/ML-MCNC: 0.01 NG/ML (ref 0–0.03)
TROPONIN I SERPL DL<=0.01 NG/ML-MCNC: 0.05 NG/ML (ref 0–0.03)
WBC # BLD AUTO: 8.37 K/UL (ref 3.9–12.7)

## 2024-08-14 PROCEDURE — 84484 ASSAY OF TROPONIN QUANT: CPT | Mod: 91 | Performed by: EMERGENCY MEDICINE

## 2024-08-14 PROCEDURE — 85025 COMPLETE CBC W/AUTO DIFF WBC: CPT | Performed by: NURSE PRACTITIONER

## 2024-08-14 PROCEDURE — 63600175 PHARM REV CODE 636 W HCPCS: Performed by: EMERGENCY MEDICINE

## 2024-08-14 PROCEDURE — 99285 EMERGENCY DEPT VISIT HI MDM: CPT | Mod: 25

## 2024-08-14 PROCEDURE — 93005 ELECTROCARDIOGRAM TRACING: CPT

## 2024-08-14 PROCEDURE — 83880 ASSAY OF NATRIURETIC PEPTIDE: CPT | Performed by: NURSE PRACTITIONER

## 2024-08-14 PROCEDURE — 85610 PROTHROMBIN TIME: CPT | Performed by: NURSE PRACTITIONER

## 2024-08-14 PROCEDURE — 25500020 PHARM REV CODE 255: Performed by: EMERGENCY MEDICINE

## 2024-08-14 PROCEDURE — 85730 THROMBOPLASTIN TIME PARTIAL: CPT | Performed by: NURSE PRACTITIONER

## 2024-08-14 PROCEDURE — 96375 TX/PRO/DX INJ NEW DRUG ADDON: CPT

## 2024-08-14 PROCEDURE — 84484 ASSAY OF TROPONIN QUANT: CPT | Performed by: NURSE PRACTITIONER

## 2024-08-14 PROCEDURE — 25000003 PHARM REV CODE 250: Performed by: EMERGENCY MEDICINE

## 2024-08-14 PROCEDURE — 93010 ELECTROCARDIOGRAM REPORT: CPT | Mod: ,,, | Performed by: INTERNAL MEDICINE

## 2024-08-14 PROCEDURE — 96374 THER/PROPH/DIAG INJ IV PUSH: CPT

## 2024-08-14 PROCEDURE — 80053 COMPREHEN METABOLIC PANEL: CPT | Performed by: NURSE PRACTITIONER

## 2024-08-14 RX ORDER — ASPIRIN 325 MG
325 TABLET ORAL
Status: DISCONTINUED | OUTPATIENT
Start: 2024-08-14 | End: 2024-08-15 | Stop reason: HOSPADM

## 2024-08-14 RX ORDER — HYDROCODONE BITARTRATE AND ACETAMINOPHEN 5; 325 MG/1; MG/1
1 TABLET ORAL
Status: COMPLETED | OUTPATIENT
Start: 2024-08-14 | End: 2024-08-14

## 2024-08-14 RX ORDER — HYDROCODONE BITARTRATE AND ACETAMINOPHEN 5; 325 MG/1; MG/1
1 TABLET ORAL EVERY 6 HOURS PRN
Qty: 12 TABLET | Refills: 0 | Status: SHIPPED | OUTPATIENT
Start: 2024-08-14

## 2024-08-14 RX ORDER — MORPHINE SULFATE 4 MG/ML
4 INJECTION, SOLUTION INTRAMUSCULAR; INTRAVENOUS
Status: COMPLETED | OUTPATIENT
Start: 2024-08-14 | End: 2024-08-14

## 2024-08-14 RX ORDER — LORAZEPAM 2 MG/ML
1 INJECTION INTRAMUSCULAR
Status: COMPLETED | OUTPATIENT
Start: 2024-08-14 | End: 2024-08-14

## 2024-08-14 RX ORDER — HYDROCODONE BITARTRATE AND ACETAMINOPHEN 5; 325 MG/1; MG/1
1 TABLET ORAL EVERY 6 HOURS PRN
Qty: 12 TABLET | Refills: 0 | Status: SHIPPED | OUTPATIENT
Start: 2024-08-14 | End: 2024-08-14

## 2024-08-14 RX ADMIN — HYDROCODONE BITARTRATE AND ACETAMINOPHEN 1 TABLET: 5; 325 TABLET ORAL at 07:08

## 2024-08-14 RX ADMIN — IOHEXOL 100 ML: 350 INJECTION, SOLUTION INTRAVENOUS at 05:08

## 2024-08-14 RX ADMIN — LORAZEPAM 1 MG: 2 INJECTION INTRAMUSCULAR; INTRAVENOUS at 04:08

## 2024-08-14 RX ADMIN — MORPHINE SULFATE 4 MG: 4 INJECTION, SOLUTION INTRAMUSCULAR; INTRAVENOUS at 04:08

## 2024-08-14 NOTE — FIRST PROVIDER EVALUATION
" Emergency Department TeleTriage Encounter Note      CHIEF COMPLAINT    Chief Complaint   Patient presents with    Chest Pain     Pt reports constant "aggravating" L sided chest pain since yesterday worse on inspiration. Pt states he is currently being treated for PE on R side and this pain feels similar       VITAL SIGNS   Initial Vitals [08/14/24 1407]   BP Pulse Resp Temp SpO2   127/77 72 20 99.1 °F (37.3 °C) (!) 94 %      MAP       --            ALLERGIES    Review of patient's allergies indicates:   Allergen Reactions    Ace inhibitors Other (See Comments) and Swelling     angioedema  Face, lips, tongue swelling      Lisinopril Swelling    Betadine [povidone-iodine] Rash     Pt stated when he was donating blood years ago, skin turned another color       PROVIDER TRIAGE NOTE  This is a teletriage evaluation of a 64 y.o. male presenting to the ED with c/o left sided chest pain, began yesterday. Reports PE diagnosed on 8/6. On eliquis, reports taking as prescribed. Limited physical exam via telehealth: The patient is awake, alert, answering questions appropriately and is not in respiratory distress. Initial orders will be placed and care will be transferred to an alternate provider when patient is roomed for a full evaluation. Any additional orders and the final disposition will be determined by that provider.         ORDERS  Labs Reviewed   CBC W/ AUTO DIFFERENTIAL   COMPREHENSIVE METABOLIC PANEL   TROPONIN I   B-TYPE NATRIURETIC PEPTIDE   PROTIME-INR   APTT       ED Orders (720h ago, onward)      Start Ordered     Status Ordering Provider    08/14/24 1427 08/14/24 1426  APTT  STAT         Ordered BRETT GIBBONS    08/14/24 1426 08/14/24 1426  Vital signs  Every 15 min         Ordered BRETT GIBBONS    08/14/24 1426 08/14/24 1426  Cardiac Monitoring - Adult  Continuous        Comments: Notify Physician If:    Ordered BRETT GIBBONS    08/14/24 1426 08/14/24 1426  Pulse Oximetry Continuous  Continuous      "    Ordered EDWIGE, BRETT P.    08/14/24 1426 08/14/24 1426  Diet NPO  Diet effective now         Ordered BRETT GIBBONS P.    08/14/24 1426 08/14/24 1426  Saline lock IV  Once         Ordered GIBBONS, BRETT P.    08/14/24 1426 08/14/24 1426  EKG 12-lead  Once        Comments: Do not perform if previously done during this visit/ triage    Ordered BRETT GIBBONS P.    08/14/24 1426 08/14/24 1426  CBC auto differential  STAT         Ordered GIBBONS, BRETT P.    08/14/24 1426 08/14/24 1426  Comprehensive metabolic panel  STAT         Ordered GIBBONS, BRETT P.    08/14/24 1426 08/14/24 1426  Troponin I #1  STAT         Ordered GIBBONS, BRETT P.    08/14/24 1426 08/14/24 1426  B-Type natriuretic peptide (BNP)  STAT         Ordered EDWIGE, BRETT P.    08/14/24 1426 08/14/24 1426  X-Ray Chest PA And Lateral  1 time imaging         Ordered BRETT GIBBONS P.    08/14/24 1426 08/14/24 1426  Protime-INR  STAT         Ordered BRETT GIBBONS P.    08/14/24 1418 08/14/24 1417  EKG 12-lead  Once         Completed by DUNG ESCAMILLA on 8/14/2024 at  2:17 PM MIC CRUZ              Virtual Visit Note: The provider triage portion of this emergency department evaluation and documentation was performed via [employer], a HIPAA-compliant telemedicine application, in concert with a tele-presenter in the room. A face to face patient evaluation with one of my colleagues will occur once the patient is placed in an emergency department room.      DISCLAIMER: This note was prepared with [employer] voice recognition transcription software. Garbled syntax, mangled pronouns, and other bizarre constructions may be attributed to that software system.

## 2024-08-14 NOTE — ED TRIAGE NOTES
Dx with right PE on 8/6 and has been taking Eliquis. Today with c/o sharp, stabbing pain in left chest since yesterday. States pain is intermittent and worse with deep breath. Denies SOB or fever. Presents awake, alert, oriented. Resp unlabored.
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
## 2024-08-15 ENCOUNTER — NURSE TRIAGE (OUTPATIENT)
Dept: ADMINISTRATIVE | Facility: CLINIC | Age: 64
End: 2024-08-15
Payer: MEDICARE

## 2024-08-15 ENCOUNTER — TELEPHONE (OUTPATIENT)
Dept: PULMONOLOGY | Facility: CLINIC | Age: 64
End: 2024-08-15
Payer: MEDICARE

## 2024-08-15 LAB
OHS QRS DURATION: 92 MS
OHS QTC CALCULATION: 415 MS

## 2024-08-15 NOTE — TELEPHONE ENCOUNTER
----- Message from Antonette Jackson sent at 8/15/2024  8:50 AM CDT -----  Regarding: Referral  Good morning,      I received a referral on behalf of the attached patient from Carol Dumont MD., with a diagnosis of acute pulmonary embolism, unspecified pulmonary embolism type, unspecified whether acute cor pulmonale present. The patient also has COPD/Asthma.  I have scanned the referral and notes into media mgr.  Please review contact the patient to schedule, or to advise.     Thank you,     Antonette Jackson  [facility]

## 2024-08-15 NOTE — DISCHARGE INSTRUCTIONS
Mr. Reardon,    Thank you for letting me care for you today! It was nice meeting you, and I hope you feel better soon.   If you would like access to your chart and what was done today please utilize the [facility].   Please come back to [facility] for all of your future medical needs.    Our goal in the emergency department is to always give you outstanding care and exceptional service. You may receive a survey by mail or e-mail in the next week regarding your experience in our ED. We would greatly appreciate you completing and returning the survey. Your feedback provides us with a way to recognize our staff who give very good care and it helps us learn how to improve when your experience was below our aspiration of excellence.     Sincerely,    Manuel Ayon MD  Board Certified Emergency Physician

## 2024-08-15 NOTE — TELEPHONE ENCOUNTER
Reason for Disposition   Caller has medicine question only, adult not sick, and triager answers question    Additional Information   Negative: Intentional drug overdose and suicidal thoughts or ideas   Negative: MORE THAN A DOUBLE DOSE of a prescription or over-the-counter (OTC) drug   Negative: DOUBLE DOSE (an extra dose or lesser amount) of prescription drug and any symptoms (e.g., dizziness, nausea, pain, sleepiness)   Negative: DOUBLE DOSE (an extra dose or lesser amount) of over-the-counter (OTC) drug and any symptoms (e.g., dizziness, nausea, pain, sleepiness)   Negative: Took another person's prescription drug   Negative: DOUBLE DOSE (an extra dose or lesser amount) of prescription drug and NO symptoms  (Exception: A double dose of antibiotics.)   Negative: Diabetes drug error or overdose (e.g., took wrong type of insulin or took extra dose)   Negative: Caller has medication question about med NOT prescribed by PCP and triager unable to answer question (e.g., compatibility with other med, storage)   Negative: Prescription not at pharmacy and was prescribed by PCP recently  (Exception: triager has access to EMR and prescription is recorded there. Go to Home Care and confirm for pharmacy.)   Negative: Pharmacy calling with prescription question and triager unable to answer question   Negative: Caller has URGENT medicine question about med that PCP or specialist prescribed and triager unable to answer question   Negative: Caller has NON-URGENT medicine question about med that PCP or specialist prescribed and triager unable to answer question   Negative: Caller wants to use a complementary or alternative medicine   Negative: Medicine patch causing local rash or itching   Negative: Prescription request for new medicine (not a refill)   Negative: Prescription prescribed recently is not at pharmacy and triager has access to patient's EMR and prescription is recorded in the EMR   Negative: DOUBLE DOSE (an extra dose  or lesser amount) of over-the-counter (OTC) drug and NO symptoms   Negative: DOUBLE DOSE (an extra dose or lesser amount) of antibiotic drug and NO symptoms    Protocols used: Medication Question Call-A-[place]  Patient states he was recently diagnosed with blood clot on his long. State was prescribed Eliquis and wants to know if he should take an aspirin 81mg. Patient states his doctor did not tell him to start aspirin, states he started taking them. Patient instructed not to take any additional medications without talking to his primary care doctor. Instructed to only take the medications that were prescribed to him. Patient missed a call from DHRUV Angulo at [facility] today for scheduling. Patient stated he can be reached at  or [phone]. Secure chat sent to DHRUV Angulo to contact pt again.

## 2024-08-21 ENCOUNTER — TELEPHONE (OUTPATIENT)
Dept: PULMONOLOGY | Facility: CLINIC | Age: 64
End: 2024-08-21
Payer: MEDICARE

## 2024-08-21 NOTE — TELEPHONE ENCOUNTER
----- Message from Antonette Jackson sent at 8/21/2024 10:20 AM CDT -----  Regarding: Referral  Good morning,    I received a call from the patient regarding scheduling.  The phone number has been updated from his work number in his chart to reflect as primary.  Please contact the patient for scheduling.    ----- Message from Antonette [place] sent at 8/15/2024  8:50 AM CDT -----  Regarding: Referral  Good morning,      I received a referral on behalf of the attached patient from Carol Dumont MD., with a diagnosis of acute pulmonary embolism, unspecified pulmonary embolism type, unspecified whether acute cor pulmonale present. The patient also has COPD/Asthma.  I have scanned the referral and notes into media mgr.  Please review contact the patient to schedule, or to advise.     Thank you,     Antonette [place]

## 2024-08-21 NOTE — TELEPHONE ENCOUNTER
I spoke with patient in regards to scheduling an appointment. Patient is scheduled on 8/29/24 at 9:30 AM with Dr Zavala. Appointment mailed. Patient confirmed and verbalized understanding.

## 2024-08-22 DIAGNOSIS — J41.0 SIMPLE CHRONIC BRONCHITIS: Primary | ICD-10-CM

## 2024-08-22 DIAGNOSIS — J45.909 ASTHMA, UNSPECIFIED ASTHMA SEVERITY, UNSPECIFIED WHETHER COMPLICATED, UNSPECIFIED WHETHER PERSISTENT: ICD-10-CM

## 2024-08-27 NOTE — PROGRESS NOTES
Subjective:      Patient ID: Robinson Reardon Sr. is a 64 y.o. male.    Chief Complaint: Asthma, COPD, and Shortness of Breath    Pt is a 65 yo AAM pmh recent acute Asthma, PE, HLD, HTN, hx of CVA who presents for follow up of recent PE and chronic asthma. Eliquis was refilled for 3 months treatment. Had started having right sided chest pain and worse shortness of breath day before presented to ED. [place] like he was having heart attack. Feels like had similar pains in the past.     Per patient is being set up with Pulmonologist associated with his PCP clinic.     Smoking hx: quit ~20 years ago, continues to smoke THC- 2 pinky sized joints a day when has it  Work hx:   Exposure hx: no chem/fume, has had bronchospasm with bleach/ammonia/fabulosa in the past.   Inhaler use: Symbicort (BID), spiriva  PRN inhaler use: albuterol (2x/day when no significant activity, when more active 3-4x/day)  Hx of lung dz: asthma  Family hx of lung dz: mother- COPD/Asthma  Taking singulair daily.     Activity level: ~200 ft before short of breath.     Review of Systems   Constitutional:  Negative for weight loss, weight gain, activity change and fatigue.   HENT:  Negative for postnasal drip and congestion.    Respiratory:  Positive for dyspnea on extertion. Negative for cough, hemoptysis, sputum production, chest tightness, shortness of breath, wheezing and use of rescue inhaler.    Cardiovascular:  Negative for chest pain, palpitations and leg swelling.   Gastrointestinal:  Negative for nausea, vomiting and acid reflux.   Neurological:  Negative for dizziness and light-headedness.   Psychiatric/Behavioral:  Negative for confusion and sleep disturbance. The patient is not nervous/anxious.      Objective:     Physical Exam   Constitutional: He is oriented to person, place, and time. He appears well-developed and well-nourished. He is obese.   HENT:   Head: Normocephalic.   Cardiovascular: Normal rate, regular rhythm and normal heart sounds.  "  Pulmonary/Chest: Normal expansion, symmetric chest wall expansion and effort normal. He has no wheezes. He has no rhonchi. He has no rales.   Musculoskeletal:         General: No edema.   Neurological: He is alert and oriented to person, place, and time. Gait abnormal.   Skin: No cyanosis. Nails show no clubbing.   Psychiatric: He has a normal mood and affect. His behavior is normal. Judgment and thought content normal.   Vitals reviewed.    Personal Diagnostic Review    CT of chest performed on 8/14/2024 PE protocol revealed RUL pulmonary embolism. No significant parenchymal or interstitial disease.     Echocardiogram: 8/7/24    Left Ventricle: The left ventricle is mildly dilated. Ventricular mass is normal. Normal wall thickness. There is normal systolic function. Ejection fraction by visual approximation is 60%. Grade I diastolic dysfunction. Normal left ventricular filling pressure. Tissue Doppler velocity is reduced.    Right Ventricle: Normal right ventricular cavity size. Wall thickness is normal. Systolic function is normal.    Pulmonary Artery: The estimated pulmonary artery systolic pressure is 18 mmHg.    IVC/SVC: Normal venous pressure at 3 mmHg.    Pulmonary function tests:   8/29/24  FEV1: 1.49L  (54.4 % predicted),   FVC:  2.11L (56.6 % predicted),   FEV1/FVC:  71,   TLC: not performed  DLCO: 13.53 (48.4% % predicted)    PFTs (8/22/2018):   FVC 2.37, 56%  FEV1 1.74, 53%  FEV1/FVC: 73  RV 2.62, 113%  TLC 5.33, 74%  RV/TLC 49, 148%  DLCO: 20.34, 60%     In hospital walk test:   Patient SpO2 on room air at rest 94%, HR 89     Patient SpO2 on room air during 6 min walk , lowest 90%, HR 90 Highest 93%, HR 90, no short of breath.        8/14/2024     7:22 PM 8/14/2024     4:52 PM 8/14/2024     3:17 PM 8/14/2024     2:07 PM 8/9/2024     1:23 PM 8/9/2024    11:38 AM 8/9/2024    11:15 AM   Pulmonary Function Tests   SpO2 96 % 95 % 95 % 94 % 99 % 98 % 98 %   Height    5' 11" (1.803 m)      Weight    117.5 kg " (259 lb)      BMI (Calculated)    36.1        Assessment:     1. Moderate persistent asthma without complication    2. Other acute pulmonary embolism without acute cor pulmonale    3. Simple chronic bronchitis    4. Class 2 obesity due to excess calories without serious comorbidity with body mass index (BMI) of 37.0 to 37.9 in adult         Outpatient Encounter Medications as of 8/29/2024   Medication Sig Dispense Refill    albuterol (ACCUNEB) 1.25 mg/3 mL Nebu TAKE 3MLS BY NUBULIZATION EVERY 6 HOURS AS NEEDED FOR WHEEZING 75 mL 0    albuterol (PROVENTIL/VENTOLIN HFA) 90 mcg/actuation inhaler Inhale 2 puffs into the lungs every 4 (four) hours as needed for Wheezing or Shortness of Breath. Rescue      amLODIPine (NORVASC) 5 MG tablet Take 5 mg by mouth once daily.      apixaban (ELIQUIS) 5 mg (74 tabs) DsPk For the first 7 days take two 5 mg tablets twice daily.  After 7 days take one 5 mg tablet twice daily. 74 each 0    atorvastatin (LIPITOR) 40 MG tablet Take 40 mg by mouth once daily.      budesonide-formoterol 80-4.5 mcg (SYMBICORT) 80-4.5 mcg/actuation HFAA Inhale 2 puffs into the lungs 2 (two) times a day. Controller      cyanocobalamin (VITAMIN B-12) 1000 MCG tablet Take 1,000 mcg by mouth once daily.      diclofenac sodium (VOLTAREN) 1 % Gel Apply topically.      ergocalciferol (ERGOCALCIFEROL) 50,000 unit Cap Take 1 capsule by mouth every 7 days.      finasteride (PROSCAR) 5 mg tablet Take 5 mg by mouth once daily.      furosemide (LASIX) 40 MG tablet Take 40 mg by mouth once daily.      HYDROcodone-acetaminophen (NORCO) 5-325 mg per tablet Take 1 tablet by mouth every 6 (six) hours as needed for Pain. 12 tablet 0    levocetirizine (XYZAL) 5 MG tablet Take 5 mg by mouth every evening.      levothyroxine (SYNTHROID) 50 MCG tablet Take 50 mcg by mouth before breakfast.      losartan (COZAAR) 25 MG tablet Take 1 tablet by mouth once daily.      methocarbamol (ROBAXIN) 500 MG Tab Take 750 mg by mouth 3 (three)  times daily.      metoprolol succinate (TOPROL-XL) 25 MG 24 hr tablet Take 25 mg by mouth once daily.      montelukast (SINGULAIR) 10 mg tablet Take 10 mg by mouth every evening.      predniSONE (DELTASONE) 10 MG tablet Take 30 mg by mouth.      spironolactone (ALDACTONE) 25 MG tablet Take 25 mg by mouth once daily.      tamsulosin (FLOMAX) 0.4 mg Cp24 Take 0.8 mg by mouth once daily.      tiotropium bromide (SPIRIVA RESPIMAT) 1.25 mcg/actuation inhaler Inhale 1 puff into the lungs once daily. Controller      traMADoL (ULTRAM) 50 mg tablet Take 1 tablet (50 mg total) by mouth every 12 (twelve) hours as needed for Pain. (Patient not taking: Reported on 8/29/2024) 14 tablet 0     No facility-administered encounter medications on file as of 8/29/2024.     No orders of the defined types were placed in this encounter.    Plan:     COPD (chronic obstructive pulmonary disease)  Former smoker, quit tobacco use ~2018 with continued use of THC. Continue use of Symbicort and spiriva. Discussed appropriate use, rinsing mouth out after use. PRN use of albuterol.     Asthma  As per COPD. Continue singulair.     Acute pulmonary embolism without acute cor pulmonale  Continue Eliquis BID as discussed with PCP.     Class 2 obesity due to excess calories without serious comorbidity with body mass index (BMI) of 37.0 to 37.9 in adult  Complicates all aspects of care. Discussed improvement in weight loss can improve pulmonary function.     Follow up as needed.     Quinten Zavala MD  [facility]

## 2024-08-29 ENCOUNTER — OFFICE VISIT (OUTPATIENT)
Dept: PULMONOLOGY | Facility: CLINIC | Age: 64
End: 2024-08-29
Payer: MEDICARE

## 2024-08-29 ENCOUNTER — HOSPITAL ENCOUNTER (OUTPATIENT)
Dept: PULMONOLOGY | Facility: CLINIC | Age: 64
Discharge: HOME | End: 2024-08-29
Payer: MEDICARE

## 2024-08-29 VITALS
DIASTOLIC BLOOD PRESSURE: 88 MMHG | OXYGEN SATURATION: 95 % | HEART RATE: 67 BPM | HEIGHT: 70 IN | SYSTOLIC BLOOD PRESSURE: 146 MMHG | BODY MASS INDEX: 37.3 KG/M2 | WEIGHT: 260.56 LBS

## 2024-08-29 DIAGNOSIS — J45.909 ASTHMA, UNSPECIFIED ASTHMA SEVERITY, UNSPECIFIED WHETHER COMPLICATED, UNSPECIFIED WHETHER PERSISTENT: ICD-10-CM

## 2024-08-29 DIAGNOSIS — J45.40 MODERATE PERSISTENT ASTHMA WITHOUT COMPLICATION: Primary | ICD-10-CM

## 2024-08-29 DIAGNOSIS — E66.09 CLASS 2 OBESITY DUE TO EXCESS CALORIES WITHOUT SERIOUS COMORBIDITY WITH BODY MASS INDEX (BMI) OF 37.0 TO 37.9 IN ADULT: ICD-10-CM

## 2024-08-29 DIAGNOSIS — J41.0 SIMPLE CHRONIC BRONCHITIS: ICD-10-CM

## 2024-08-29 DIAGNOSIS — I26.99 OTHER ACUTE PULMONARY EMBOLISM WITHOUT ACUTE COR PULMONALE: ICD-10-CM

## 2024-08-29 PROBLEM — E66.812 CLASS 2 OBESITY DUE TO EXCESS CALORIES WITHOUT SERIOUS COMORBIDITY WITH BODY MASS INDEX (BMI) OF 37.0 TO 37.9 IN ADULT: Status: ACTIVE | Noted: 2024-08-29

## 2024-08-29 LAB
DLCO ADJ PRE: 13.53 ML/(MIN*MMHG) (ref 21.04–34.9)
DLCO SINGLE BREATH LLN: 21.04
DLCO SINGLE BREATH PRE REF: 44.9 %
DLCO SINGLE BREATH REF: 27.97
DLCOC SBVA LLN: 2.76
DLCOC SBVA PRE REF: 120.7 %
DLCOC SBVA REF: 3.93
DLCOC SINGLE BREATH LLN: 21.04
DLCOC SINGLE BREATH PRE REF: 48.4 %
DLCOC SINGLE BREATH REF: 27.97
DLCOCSBVAULN: 5.09
DLCOCSINGLEBREATHULN: 34.9
DLCOCSINGLEBREATHZSCORE: -3.43
DLCOSINGLEBREATHULN: 34.9
DLCOSINGLEBREATHZSCORE: -3.66
DLCOVA LLN: 2.76
DLCOVA PRE REF: 112.1 %
DLCOVA PRE: 4.4 ML/(MIN*MMHG*L) (ref 2.76–5.09)
DLCOVA REF: 3.93
DLCOVAULN: 5.09
DLVAADJ PRE: 4.74 ML/(MIN*MMHG*L) (ref 2.76–5.09)
FEF 25 75 LLN: 1.6
FEF 25 75 PRE REF: 23.5 %
FEF 25 75 REF: 3.31
FET100 CHG: 1.9 %
FEV05 LLN: 1.58
FEV05 REF: 2.71
FEV1 CHG: 4.8 %
FEV1 FVC LLN: 65
FEV1 FVC PRE REF: 87.2 %
FEV1 FVC REF: 77
FEV1 LLN: 2.01
FEV1 PRE REF: 49.6 %
FEV1 REF: 2.87
FEV1 VOL CHG: 0.07
FEV1FVCZSCORE: -1.33
FEV1ZSCORE: -2.7
FVC CHG: -0.2 %
FVC LLN: 2.71
FVC PRE REF: 56.8 %
FVC REF: 3.72
FVC VOL CHG: -0.01
FVCZSCORE: -2.64
IVC PRE: 1.71 L (ref 2.71–4.74)
IVC SINGLE BREATH LLN: 2.71
IVC SINGLE BREATH PRE REF: 46.1 %
IVC SINGLE BREATH REF: 3.72
IVCSINGLEBREATHULN: 4.74
PEF LLN: 5.52
PEF PRE REF: 58.2 %
PEF REF: 8.17
PHYSICIAN COMMENT: ABNORMAL
POST FEF 25 75: 0.92 L/S (ref 1.6–5.02)
POST FET 100: 6.66 SEC
POST FEV1 FVC: 70.68 % (ref 64.75–88.13)
POST FEV1: 1.49 L (ref 2.01–3.66)
POST FEV5: 1.15 L (ref 1.58–3.85)
POST FVC: 2.11 L (ref 2.71–4.74)
POST PEF: 5.16 L/S (ref 5.52–10.83)
PRE DLCO: 12.57 ML/(MIN*MMHG) (ref 21.04–34.9)
PRE FEF 25 75: 0.78 L/S (ref 1.6–5.02)
PRE FET 100: 6.53 SEC
PRE FEV05 REF: 39.8 %
PRE FEV1 FVC: 67.28 % (ref 64.75–88.13)
PRE FEV1: 1.42 L (ref 2.01–3.66)
PRE FEV5: 1.08 L (ref 1.58–3.85)
PRE FVC: 2.11 L (ref 2.71–4.74)
PRE PEF: 4.76 L/S (ref 5.52–10.83)
VA PRE: 2.86 L (ref 6.98–6.98)
VA SINGLE BREATH LLN: 6.98
VA SINGLE BREATH PRE REF: 40.9 %
VA SINGLE BREATH REF: 6.98
VASINGLEBREATHULN: 6.98

## 2024-08-29 PROCEDURE — 99999 PR PBB SHADOW E&M-EST. PATIENT-LVL IV: CPT | Mod: PBBFAC,,, | Performed by: INTERNAL MEDICINE

## 2024-08-29 RX ORDER — PREDNISONE 10 MG/1
30 TABLET ORAL
COMMUNITY

## 2024-08-29 RX ORDER — LOSARTAN POTASSIUM 25 MG/1
1 TABLET ORAL DAILY
COMMUNITY

## 2024-08-29 RX ORDER — ERGOCALCIFEROL 1.25 MG/1
1 CAPSULE ORAL
COMMUNITY

## 2024-08-29 NOTE — ASSESSMENT & PLAN NOTE
Former smoker, quit tobacco use ~2018 with continued use of THC. Continue use of Symbicort and spiriva. Discussed appropriate use, rinsing mouth out after use. PRN use of albuterol.

## 2024-08-29 NOTE — ASSESSMENT & PLAN NOTE
Complicates all aspects of care. Discussed improvement in weight loss can improve pulmonary function.
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
## 2024-09-23 ENCOUNTER — OFFICE VISIT (OUTPATIENT)
Dept: ORTHOPEDICS | Facility: CLINIC | Age: 64
End: 2024-09-23
Payer: MEDICARE

## 2024-09-23 VITALS — WEIGHT: 260.56 LBS | BODY MASS INDEX: 37.3 KG/M2 | HEIGHT: 70 IN

## 2024-09-23 DIAGNOSIS — M54.16 LUMBAR RADICULOPATHY, CHRONIC: Primary | ICD-10-CM

## 2024-09-23 PROCEDURE — 3288F FALL RISK ASSESSMENT DOCD: CPT | Mod: CPTII,S$GLB,, | Performed by: ORTHOPAEDIC SURGERY

## 2024-09-23 PROCEDURE — 1101F PT FALLS ASSESS-DOCD LE1/YR: CPT | Mod: CPTII,S$GLB,, | Performed by: ORTHOPAEDIC SURGERY

## 2024-09-23 PROCEDURE — 3008F BODY MASS INDEX DOCD: CPT | Mod: CPTII,S$GLB,, | Performed by: ORTHOPAEDIC SURGERY

## 2024-09-23 PROCEDURE — 99204 OFFICE O/P NEW MOD 45 MIN: CPT | Mod: S$GLB,,, | Performed by: ORTHOPAEDIC SURGERY

## 2024-09-23 PROCEDURE — 1159F MED LIST DOCD IN RCRD: CPT | Mod: CPTII,S$GLB,, | Performed by: ORTHOPAEDIC SURGERY

## 2024-09-23 PROCEDURE — 1125F AMNT PAIN NOTED PAIN PRSNT: CPT | Mod: CPTII,S$GLB,, | Performed by: ORTHOPAEDIC SURGERY

## 2024-09-23 PROCEDURE — 4010F ACE/ARB THERAPY RXD/TAKEN: CPT | Mod: CPTII,S$GLB,, | Performed by: ORTHOPAEDIC SURGERY

## 2024-09-23 PROCEDURE — 3044F HG A1C LEVEL LT 7.0%: CPT | Mod: CPTII,S$GLB,, | Performed by: ORTHOPAEDIC SURGERY

## 2024-09-23 PROCEDURE — 99999 PR PBB SHADOW E&M-EST. PATIENT-LVL IV: CPT | Mod: PBBFAC,,, | Performed by: ORTHOPAEDIC SURGERY

## 2024-09-23 RX ORDER — GABAPENTIN 300 MG/1
300 CAPSULE ORAL 3 TIMES DAILY
Qty: 90 CAPSULE | Refills: 11 | Status: SHIPPED | OUTPATIENT
Start: 2024-09-23 | End: 2025-09-23

## 2024-09-25 ENCOUNTER — TELEPHONE (OUTPATIENT)
Dept: PAIN MEDICINE | Facility: CLINIC | Age: 64
End: 2024-09-25

## 2024-09-25 ENCOUNTER — OFFICE VISIT (OUTPATIENT)
Dept: PAIN MEDICINE | Facility: CLINIC | Age: 64
End: 2024-09-25
Payer: MEDICARE

## 2024-09-25 VITALS
OXYGEN SATURATION: 98 % | WEIGHT: 262.38 LBS | RESPIRATION RATE: 18 BRPM | SYSTOLIC BLOOD PRESSURE: 123 MMHG | BODY MASS INDEX: 37.64 KG/M2 | TEMPERATURE: 99 F | HEART RATE: 56 BPM | DIASTOLIC BLOOD PRESSURE: 82 MMHG

## 2024-09-25 DIAGNOSIS — M51.36 DDD (DEGENERATIVE DISC DISEASE), LUMBAR: Primary | ICD-10-CM

## 2024-09-25 DIAGNOSIS — G89.29 CHRONIC BILATERAL LOW BACK PAIN WITHOUT SCIATICA: Primary | ICD-10-CM

## 2024-09-25 DIAGNOSIS — M54.50 CHRONIC BILATERAL LOW BACK PAIN WITHOUT SCIATICA: Primary | ICD-10-CM

## 2024-09-25 PROCEDURE — 3074F SYST BP LT 130 MM HG: CPT | Mod: CPTII,S$GLB,, | Performed by: ANESTHESIOLOGY

## 2024-09-25 PROCEDURE — 1101F PT FALLS ASSESS-DOCD LE1/YR: CPT | Mod: CPTII,S$GLB,, | Performed by: ANESTHESIOLOGY

## 2024-09-25 PROCEDURE — 1159F MED LIST DOCD IN RCRD: CPT | Mod: CPTII,S$GLB,, | Performed by: ANESTHESIOLOGY

## 2024-09-25 PROCEDURE — 3079F DIAST BP 80-89 MM HG: CPT | Mod: CPTII,S$GLB,, | Performed by: ANESTHESIOLOGY

## 2024-09-25 PROCEDURE — 99999 PR PBB SHADOW E&M-EST. PATIENT-LVL V: CPT | Mod: PBBFAC,,, | Performed by: ANESTHESIOLOGY

## 2024-09-25 PROCEDURE — 3044F HG A1C LEVEL LT 7.0%: CPT | Mod: CPTII,S$GLB,, | Performed by: ANESTHESIOLOGY

## 2024-09-25 PROCEDURE — 3288F FALL RISK ASSESSMENT DOCD: CPT | Mod: CPTII,S$GLB,, | Performed by: ANESTHESIOLOGY

## 2024-09-25 PROCEDURE — 1125F AMNT PAIN NOTED PAIN PRSNT: CPT | Mod: CPTII,S$GLB,, | Performed by: ANESTHESIOLOGY

## 2024-09-25 PROCEDURE — 4010F ACE/ARB THERAPY RXD/TAKEN: CPT | Mod: CPTII,S$GLB,, | Performed by: ANESTHESIOLOGY

## 2024-09-25 PROCEDURE — 3008F BODY MASS INDEX DOCD: CPT | Mod: CPTII,S$GLB,, | Performed by: ANESTHESIOLOGY

## 2024-09-25 PROCEDURE — 99204 OFFICE O/P NEW MOD 45 MIN: CPT | Mod: S$GLB,,, | Performed by: ANESTHESIOLOGY

## 2024-09-25 RX ORDER — ACETAMINOPHEN 500 MG
1000 TABLET ORAL EVERY 8 HOURS PRN
Qty: 180 TABLET | Refills: 1 | Status: SHIPPED | OUTPATIENT
Start: 2024-09-25

## 2024-09-25 NOTE — TELEPHONE ENCOUNTER
----- Message from Lyric Bowers MA sent at 9/25/2024  3:57 PM CDT -----  Hi Dr. Cline,     I was performing some post visit rounding and Mr. Reardon mention that a prescription for XS tylenol was suppose to be sent to his pharmacy as he doesn't have the money to buy OTC and he was told they would be.     Is this something that was upon agreed on, be sent today.     He also reported his visit was excellent with you.     Lyric
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
## 2024-09-27 ENCOUNTER — HOSPITAL ENCOUNTER (OUTPATIENT)
Dept: RADIOLOGY | Facility: OTHER | Age: 64
Discharge: HOME | End: 2024-09-27
Attending: ORTHOPAEDIC SURGERY
Payer: MEDICARE

## 2024-09-27 DIAGNOSIS — M54.16 LUMBAR RADICULOPATHY, CHRONIC: ICD-10-CM

## 2024-09-27 PROCEDURE — 72148 MRI LUMBAR SPINE W/O DYE: CPT | Mod: 26,,, | Performed by: RADIOLOGY

## 2024-09-27 PROCEDURE — 72148 MRI LUMBAR SPINE W/O DYE: CPT | Mod: TC

## 2024-09-27 PROCEDURE — 72110 X-RAY EXAM L-2 SPINE 4/>VWS: CPT | Mod: 26,,, | Performed by: RADIOLOGY

## 2024-09-27 PROCEDURE — 72110 X-RAY EXAM L-2 SPINE 4/>VWS: CPT | Mod: TC,FY

## 2024-09-30 NOTE — PROGRESS NOTES
Established Patient - Audio Only Telehealth Visit     The patient location is: home  The chief complaint leading to consultation is: MRI results  Visit type: Virtual visit with audio only (telephone)  Total time spent with patient: 10 min       The reason for the audio only service rather than synchronous audio and video virtual visit was related to technical difficulties or patient preference/necessity.     Each patient to whom I provide medical services by telemedicine is:  (1) informed of the relationship between the physician and patient and the respective role of any other health care provider with respect to management of the patient; and (2) notified that they may decline to receive medical services by telemedicine and may withdraw from such care at any time. Patient verbally consented to receive this service via voice-only telephone call.    DATE: 9/30/2024  PATIENT: Robinson Reardon SrAminta    Attending Physician: Mio Almeida M.D.    HISTORY:  Robinson Reardon Sr. is a 64 y.o. male who returns to me today for MRI results.  He was last seen by me 9/23/2024.  Today he is doing well but notes he continues to have  low back and bilateral (R>L) leg pain (Back - 10, Leg - 10). The pain has been present for years, worsening after his hip replacement. The patient describes the pain as sharp on the right side of his lower back and radiating down both legs.  The pain is worse with walking and improved by nothing. There is mild associated numbness and tingling. There is positive subjective weakness. Prior treatments have included PT, but no ESIs or surgery.     The Patient denies myelopathic symptoms such as handwriting changes or difficulty with buttons/coins/keys. Denies perineal paresthesias, bowel/bladder dysfunction.      EXAM:  There were no vitals taken for this visit.    My physical examination was notable for the following findings:     Musculoskeletal and neuro exam stable      IMAGING:    Today I personally re-  reviewed AP, Lat and Flex/Ex  upright L-spine that demonstrate (outside facility report) details significant degenerative changes with age indeterminate L1 VCF.     MRI lumbar demonstrates multilevel degenerative change predominantly on the basis of facet arthropathy.  Resultant multilevel neural foraminal narrowing, severe at L5-S1.       There is no height or weight on file to calculate BMI.    Hemoglobin A1C   Date Value Ref Range Status   07/22/2024 5.8 (H) 4.7 - 5.6 % Final         ASSESSMENT/PLAN:    There are no diagnoses linked to this encounter.    Today we discussed at length all of the different treatment options including anti-inflammatories, acetaminophen, rest, ice, heat, physical therapy including strengthening and stretching exercises, home exercises, ROM, aerobic conditioning, aqua therapy, other modalities including ultrasound, massage, and dry needling, epidural steroid injections and finally surgical intervention.      Pt presents with chronic lumbar radiculopathy. Failure of conservative rx. Will increase gabapentin to 600mg TID and order bilateral L5-S1 TFESI with pain management. Pt will fu if pain persists.                 This service was not originating from a related E/M service provided within the previous 7 days nor will  to an E/M service or procedure within the next 24 hours or my soonest available appointment.  Prevailing standard of care was able to be met in this audio-only visit.

## 2024-10-01 ENCOUNTER — TELEPHONE (OUTPATIENT)
Dept: ORTHOPEDICS | Facility: CLINIC | Age: 64
End: 2024-10-01
Payer: MEDICARE

## 2024-10-01 ENCOUNTER — OFFICE VISIT (OUTPATIENT)
Dept: ORTHOPEDICS | Facility: CLINIC | Age: 64
End: 2024-10-01
Payer: MEDICARE

## 2024-10-01 DIAGNOSIS — M54.16 LUMBAR RADICULOPATHY, CHRONIC: Primary | ICD-10-CM

## 2024-10-01 RX ORDER — GABAPENTIN 600 MG/1
600 TABLET ORAL 3 TIMES DAILY
Qty: 90 TABLET | Refills: 11 | Status: SHIPPED | OUTPATIENT
Start: 2024-10-01 | End: 2025-10-01

## 2024-10-01 NOTE — TELEPHONE ENCOUNTER
Called back, left VM    ----- Message from Verena sent at 10/1/2024  8:08 AM CDT -----  Regarding: PT'S RETURNING A CALL BACK REGARDING RESULTS TO HIS MRI  Contact: PT  Confirmed contact info below:  Contact Name: Robinson Reardon  Phone Number: [phone]

## 2024-10-03 ENCOUNTER — CLINICAL SUPPORT (OUTPATIENT)
Dept: REHABILITATION | Facility: OTHER | Age: 64
End: 2024-10-03
Payer: MEDICARE

## 2024-10-03 DIAGNOSIS — Z74.09 IMPAIRED FUNCTIONAL MOBILITY, BALANCE, GAIT, AND ENDURANCE: ICD-10-CM

## 2024-10-03 DIAGNOSIS — M54.50 CHRONIC BILATERAL LOW BACK PAIN WITHOUT SCIATICA: Primary | ICD-10-CM

## 2024-10-03 DIAGNOSIS — G89.29 CHRONIC BILATERAL LOW BACK PAIN WITHOUT SCIATICA: Primary | ICD-10-CM

## 2024-10-03 DIAGNOSIS — M25.551 RIGHT HIP PAIN: ICD-10-CM

## 2024-10-03 PROCEDURE — 97530 THERAPEUTIC ACTIVITIES: CPT | Mod: PN | Performed by: PHYSICAL THERAPIST

## 2024-10-03 PROCEDURE — 97162 PT EVAL MOD COMPLEX 30 MIN: CPT | Mod: PN | Performed by: PHYSICAL THERAPIST
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
## 2024-10-08 ENCOUNTER — TELEPHONE (OUTPATIENT)
Dept: PAIN MEDICINE | Facility: CLINIC | Age: 64
End: 2024-10-08
Payer: MEDICARE

## 2024-10-08 NOTE — TELEPHONE ENCOUNTER
Pt wants to know will he need somebody with him. Staff verbalized to patient that we will get the procedure area to give him a call about his questions and concerns.

## 2024-10-08 NOTE — TELEPHONE ENCOUNTER
----- Message from [place] sent at 10/8/2024  8:44 AM CDT -----  Regarding: pt  Name of Who is Calling:Pt         What is the request in detail: Inquiring if he will need someone to be with him for procedure  On 10-21  Please advise            Can the clinic reply by [facility]: no         What Number to Call Back if not in [employer]:Telephone Information:  [employer]          [phone]

## 2024-10-14 ENCOUNTER — DOCUMENTATION ONLY (OUTPATIENT)
Dept: REHABILITATION | Facility: OTHER | Age: 64
End: 2024-10-14
Payer: MEDICARE

## 2024-10-14 DIAGNOSIS — M54.50 CHRONIC BILATERAL LOW BACK PAIN WITHOUT SCIATICA: Primary | ICD-10-CM

## 2024-10-14 DIAGNOSIS — Z74.09 IMPAIRED FUNCTIONAL MOBILITY, BALANCE, GAIT, AND ENDURANCE: ICD-10-CM

## 2024-10-14 DIAGNOSIS — M25.551 RIGHT HIP PAIN: ICD-10-CM

## 2024-10-14 DIAGNOSIS — G89.29 CHRONIC BILATERAL LOW BACK PAIN WITHOUT SCIATICA: Primary | ICD-10-CM

## 2024-10-14 NOTE — PROGRESS NOTES
Physical Therapy No Show Note       Patient was scheduled for physical therapy at [facility] for 10/14/2024. Pt failed to appear for appointment without prior notification for today.     Anni Fragoso, PTA

## 2024-10-15 ENCOUNTER — DOCUMENTATION ONLY (OUTPATIENT)
Dept: REHABILITATION | Facility: OTHER | Age: 64
End: 2024-10-15
Payer: MEDICARE

## 2024-10-21 ENCOUNTER — HOSPITAL ENCOUNTER (OUTPATIENT)
Facility: OTHER | Age: 64
Discharge: HOME | End: 2024-10-21
Attending: ANESTHESIOLOGY | Admitting: ANESTHESIOLOGY
Payer: MEDICARE

## 2024-10-21 VITALS
DIASTOLIC BLOOD PRESSURE: 83 MMHG | OXYGEN SATURATION: 94 % | SYSTOLIC BLOOD PRESSURE: 136 MMHG | HEART RATE: 59 BPM | RESPIRATION RATE: 18 BRPM

## 2024-10-21 DIAGNOSIS — G89.29 CHRONIC PAIN: ICD-10-CM

## 2024-10-21 DIAGNOSIS — M54.16 LUMBAR RADICULOPATHY: Primary | ICD-10-CM

## 2024-10-21 PROCEDURE — 99152 MOD SED SAME PHYS/QHP 5/>YRS: CPT | Performed by: ANESTHESIOLOGY

## 2024-10-21 PROCEDURE — 64483 NJX AA&/STRD TFRM EPI L/S 1: CPT | Mod: RT | Performed by: ANESTHESIOLOGY

## 2024-10-21 PROCEDURE — 63600175 PHARM REV CODE 636 W HCPCS: Performed by: ANESTHESIOLOGY

## 2024-10-21 PROCEDURE — 64483 NJX AA&/STRD TFRM EPI L/S 1: CPT | Mod: RT,,, | Performed by: ANESTHESIOLOGY

## 2024-10-21 PROCEDURE — 99152 MOD SED SAME PHYS/QHP 5/>YRS: CPT | Mod: ,,, | Performed by: ANESTHESIOLOGY

## 2024-10-21 PROCEDURE — 25500020 PHARM REV CODE 255: Performed by: ANESTHESIOLOGY

## 2024-10-21 RX ORDER — LIDOCAINE HYDROCHLORIDE 20 MG/ML
INJECTION, SOLUTION INFILTRATION; PERINEURAL
Status: DISCONTINUED | OUTPATIENT
Start: 2024-10-21 | End: 2024-10-21 | Stop reason: HOSPADM

## 2024-10-21 RX ORDER — FENTANYL CITRATE 50 UG/ML
INJECTION, SOLUTION INTRAMUSCULAR; INTRAVENOUS
Status: DISCONTINUED | OUTPATIENT
Start: 2024-10-21 | End: 2024-10-21 | Stop reason: HOSPADM

## 2024-10-21 RX ORDER — DEXAMETHASONE SODIUM PHOSPHATE 10 MG/ML
INJECTION INTRAMUSCULAR; INTRAVENOUS
Status: DISCONTINUED | OUTPATIENT
Start: 2024-10-21 | End: 2024-10-21 | Stop reason: HOSPADM

## 2024-10-21 RX ORDER — SODIUM CHLORIDE 9 MG/ML
INJECTION, SOLUTION INTRAVENOUS CONTINUOUS
Status: DISCONTINUED | OUTPATIENT
Start: 2024-10-21 | End: 2024-10-21 | Stop reason: HOSPADM

## 2024-10-21 RX ORDER — LIDOCAINE HYDROCHLORIDE 10 MG/ML
INJECTION, SOLUTION EPIDURAL; INFILTRATION; INTRACAUDAL; PERINEURAL
Status: DISCONTINUED | OUTPATIENT
Start: 2024-10-21 | End: 2024-10-21 | Stop reason: HOSPADM

## 2024-10-21 RX ORDER — MIDAZOLAM HYDROCHLORIDE 1 MG/ML
INJECTION INTRAMUSCULAR; INTRAVENOUS
Status: DISCONTINUED | OUTPATIENT
Start: 2024-10-21 | End: 2024-10-21 | Stop reason: HOSPADM

## 2024-10-21 NOTE — OP NOTE
Lumbar Transforaminal Epidural Steroid Injection under Fluoroscopic Guidance    The procedure, risks, benefits, and options were discussed with the patient. There are no contraindications to the procedure. The patent expressed understanding and agreed to the procedure. Informed written consent was obtained prior to the start of the procedure and can be found in the patient's chart.    PATIENT NAME: Robinson Reardon Sr.   MRN: [id number]     DATE OF PROCEDURE: 10/21/2024    PROCEDURE:  Right  L5/S1 Lumbar Transforaminal Epidural Steroid Injection under Fluoroscopic Guidance    PRE-OP DIAGNOSIS: Lumbar radiculopathy, chronic [M54.16] Lumbar radiculopathy [M54.16]    POST-OP DIAGNOSIS: Same    PHYSICIAN: Laura Cline MD    ASSISTANTS: Radha Nava MD     MEDICATIONS INJECTED: Preservative-free Decadron 10mg with 5cc of Lidocaine 1% MPF     LOCAL ANESTHETIC INJECTED: Xylocaine 2%     SEDATION: Versed 2mg and Fentanyl 50mcg                                                                                                                                                                                     Conscious sedation ordered by M.D. Patient re-evaluation prior to administration of conscious sedation. No changes noted in patient's status from initial evaluation. The patient's vital signs were monitored by RN and patient remained hemodynamically stable throughout the procedure.    Event Time In   Sedation Start 0935   Sedation End 0950       ESTIMATED BLOOD LOSS: None    COMPLICATIONS: None    TECHNIQUE: Time-out was performed to identify the patient and procedure to be performed. With the patient laying in a prone position, the surgical area was prepped and draped in the usual sterile fashion using ChloraPrep and a fenestrated drape.The levels were determined under fluoroscopy guidance. Skin anesthesia was achieved by injecting Lidocaine 2% over the injection sites. The transforaminal spaces were then approached with a  22 gauge, 7 inch spinal quinke needle that was introduced under fluoroscopic guidance in the AP and Lateral views. Once the needle tip was in the area of the transforaminal space, and there was no blood, CSF or paraesthesias, contrast dye Omnipaque (300mg/mL) was injected to confirm placement and there was no vascular runoff. Fluoroscopic imaging in the AP and lateral views revealed a clear outline of the spinal nerve with proximal spread of agent through the neural foramen into the epidural space. 3 mL of the medication mixture listed above was injected slowly at each site. Displacement of the radio opaque contrast after injection of the medication confirmed that the medication went into the area of the transforaminal spaces. The needles were removed and bleeding was nil. A sterile dressing was applied. No specimens collected. The patient tolerated the procedure well.       The patient was monitored after the procedure in the recovery area. They were given post-procedure and discharge instructions to follow at home. The patient was discharged in a stable condition.      Santy Pérez MD     I reviewed and edited the fellow's note. I conducted my own interview and physical examination. I agree with the findings. I was present and supervising all critical portions of the procedure.

## 2024-10-21 NOTE — DISCHARGE INSTRUCTIONS

## 2024-10-21 NOTE — DISCHARGE SUMMARY
Discharge Note  Short Stay      SUMMARY     Admit Date: 10/21/2024    Attending Physician: Laura Cline MD    Discharge Physician: Laura Cline MD      Discharge Date: 10/21/2024 9:50 AM    Procedure(s) (LRB):  LUMBAR TRANSFORAMINAL BILATERAL L5/S1 DIRECTREFERRAL *ELIQUIS CLEARANCE REQUESTED* (Bilateral)    Final Diagnosis:  Lumbar radiculopathy, chronic [M54.16]      Disposition: Home or self care    Patient Instructions:   Current Discharge Medication List        CONTINUE these medications which have NOT CHANGED    Details   acetaminophen (TYLENOL) 500 MG tablet Take 2 tablets (1,000 mg total) by mouth every 8 (eight) hours as needed for Pain.  Qty: 180 tablet, Refills: 1    Associated Diagnoses: DDD (degenerative disc disease), lumbar      albuterol (ACCUNEB) 1.25 mg/3 mL Nebu TAKE 3MLS BY NUBULIZATION EVERY 6 HOURS AS NEEDED FOR WHEEZING  Qty: 75 mL, Refills: 0      albuterol (PROVENTIL/VENTOLIN HFA) 90 mcg/actuation inhaler Inhale 2 puffs into the lungs every 4 (four) hours as needed for Wheezing or Shortness of Breath. Rescue      amLODIPine (NORVASC) 5 MG tablet Take 5 mg by mouth once daily.      apixaban (ELIQUIS) 5 mg (74 tabs) DsPk For the first 7 days take two 5 mg tablets twice daily.  After 7 days take one 5 mg tablet twice daily.  Qty: 74 each, Refills: 0      atorvastatin (LIPITOR) 40 MG tablet Take 40 mg by mouth once daily.      budesonide-formoterol 80-4.5 mcg (SYMBICORT) 80-4.5 mcg/actuation HFAA Inhale 2 puffs into the lungs 2 (two) times a day. Controller      cyanocobalamin (VITAMIN B-12) 1000 MCG tablet Take 1,000 mcg by mouth once daily.      diclofenac sodium (VOLTAREN) 1 % Gel Apply topically.      ergocalciferol (ERGOCALCIFEROL) 50,000 unit Cap Take 1 capsule by mouth every 7 days.      finasteride (PROSCAR) 5 mg tablet Take 5 mg by mouth once daily.      furosemide (LASIX) 40 MG tablet Take 40 mg by mouth once daily.      gabapentin (NEURONTIN) 600 MG tablet Take 1 tablet (600 mg  total) by mouth 3 (three) times daily.  Qty: 90 tablet, Refills: 11      HYDROcodone-acetaminophen (NORCO) 5-325 mg per tablet Take 1 tablet by mouth every 6 (six) hours as needed for Pain.  Qty: 12 tablet, Refills: 0    Comments: Quantity prescribed more than 7 day supply? No  Associated Diagnoses: Atypical chest pain      levocetirizine (XYZAL) 5 MG tablet Take 5 mg by mouth every evening.      levothyroxine (SYNTHROID) 50 MCG tablet Take 50 mcg by mouth before breakfast.      losartan (COZAAR) 25 MG tablet Take 1 tablet by mouth once daily.      methocarbamol (ROBAXIN) 500 MG Tab Take 750 mg by mouth 3 (three) times daily.      metoprolol succinate (TOPROL-XL) 25 MG 24 hr tablet Take 25 mg by mouth once daily.      montelukast (SINGULAIR) 10 mg tablet Take 10 mg by mouth every evening.      predniSONE (DELTASONE) 10 MG tablet Take 30 mg by mouth.      spironolactone (ALDACTONE) 25 MG tablet Take 25 mg by mouth once daily.      tamsulosin (FLOMAX) 0.4 mg Cp24 Take 0.8 mg by mouth once daily.      tiotropium bromide (SPIRIVA RESPIMAT) 1.25 mcg/actuation inhaler Inhale 1 puff into the lungs once daily. Controller      traMADoL (ULTRAM) 50 mg tablet Take 1 tablet (50 mg total) by mouth every 12 (twelve) hours as needed for Pain.  Qty: 14 tablet, Refills: 0    Comments: Quantity prescribed more than 7 day supply? No                 Discharge Diagnosis: Same as above  Condition on Discharge: Stable with no complications to procedure   Diet on Discharge: Same as before.  Activity: as per instruction sheet.  Discharge to: Home with a responsible adult.  Follow up: 2-4 weeks       Please call my office or pager at [phone] if experienced any weakness or loss of sensation, fever > 101.5, pain uncontrolled with oral medications, persistent nausea/vomiting/or diarrhea, redness or drainage from the incisions, or any other worrisome concerns. If physician on call was not reached or could not communicate with our office for  any reason please go to the nearest emergency department     Santy Pérez MD

## 2024-10-29 RX ORDER — TRAMADOL HYDROCHLORIDE 50 MG/1
50 TABLET ORAL EVERY 12 HOURS PRN
Qty: 14 TABLET | Refills: 0 | OUTPATIENT
Start: 2024-10-29

## 2024-10-31 ENCOUNTER — TELEPHONE (OUTPATIENT)
Dept: PAIN MEDICINE | Facility: CLINIC | Age: 64
End: 2024-10-31
Payer: MEDICARE

## 2024-11-08 ENCOUNTER — OFFICE VISIT (OUTPATIENT)
Dept: PAIN MEDICINE | Facility: CLINIC | Age: 64
End: 2024-11-08
Payer: MEDICARE

## 2024-11-08 VITALS
SYSTOLIC BLOOD PRESSURE: 143 MMHG | TEMPERATURE: 98 F | BODY MASS INDEX: 37.64 KG/M2 | WEIGHT: 262.38 LBS | HEART RATE: 57 BPM | DIASTOLIC BLOOD PRESSURE: 87 MMHG

## 2024-11-08 DIAGNOSIS — M51.360 DEGENERATION OF INTERVERTEBRAL DISC OF LUMBAR REGION WITH DISCOGENIC BACK PAIN: ICD-10-CM

## 2024-11-08 DIAGNOSIS — M47.816 LUMBAR SPONDYLOSIS: Primary | ICD-10-CM

## 2024-11-08 PROCEDURE — 99999 PR PBB SHADOW E&M-EST. PATIENT-LVL IV: CPT | Mod: PBBFAC,,, | Performed by: ANESTHESIOLOGY
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
## 2024-11-25 ENCOUNTER — NURSE TRIAGE (OUTPATIENT)
Dept: ADMINISTRATIVE | Facility: CLINIC | Age: 64
End: 2024-11-25
Payer: MEDICARE

## 2024-11-25 NOTE — TELEPHONE ENCOUNTER
Spoke with pt who reports that he has been having increased swelling to bilateral lower leg for past 2 months. Reports that he is noting sore is developing to right lower leg.  Also states that leg is noted to be leaking fluid. Advised to be seen by provider within 24 hours. Appointment offered, but declined. States will go to ED    Reason for Disposition   Looks like a boil, infected sore, deep ulcer or other infected rash (spreading redness, pus)    Additional Information   Negative: SEVERE difficulty breathing (e.g., struggling for each breath, speaks in single words)   Negative: Looks like a broken bone or dislocated joint (e.g., crooked or deformed)   Negative: Sounds like a life-threatening emergency to the triager   Negative: Difficulty breathing at rest   Negative: Entire foot is cool or blue in comparison to other side   Negative: [1] Can't walk or can barely walk AND [2] new-onset   Negative: [1] Difficulty breathing with exertion (e.g., walking) AND [2] new-onset or getting worse   Negative: [1] Red area or streak AND [2] fever   Negative: [1] Swelling is painful to touch AND [2] fever   Negative: [1] Cast on leg or ankle AND [2] now increased pain   Negative: Patient sounds very sick or weak to the triager   Negative: SEVERE leg swelling (e.g., swelling extends above knee, entire leg is swollen, weeping fluid)   Negative: [1] Red area or streak [2] large (> 2 in. or 5 cm)   Negative: [1] Thigh or calf pain AND [2] only 1 side AND [3] present > 1 hour   Negative: [1] Thigh, calf, or ankle swelling AND [2] only 1 side   Negative: [1] Thigh, calf, or ankle swelling AND [2] bilateral AND [3] 1 side is more swollen   Negative: [1] MODERATE leg swelling (e.g., swelling extends up to knees) AND [2] new-onset or getting WORSE   Negative: Swelling of face, arm or hands  (Exception: Slight puffiness of fingers occurring during hot weather.)    Protocols used: Leg Swelling and Edema-A-AH

## 2024-12-01 ENCOUNTER — HOSPITAL ENCOUNTER (EMERGENCY)
Facility: OTHER | Age: 64
Discharge: HOME | End: 2024-12-01
Attending: EMERGENCY MEDICINE
Payer: MEDICARE

## 2024-12-01 VITALS
BODY MASS INDEX: 35.98 KG/M2 | SYSTOLIC BLOOD PRESSURE: 130 MMHG | WEIGHT: 257 LBS | OXYGEN SATURATION: 96 % | TEMPERATURE: 98 F | HEIGHT: 71 IN | DIASTOLIC BLOOD PRESSURE: 67 MMHG | RESPIRATION RATE: 18 BRPM | HEART RATE: 64 BPM

## 2024-12-01 DIAGNOSIS — M25.551 RIGHT HIP PAIN: ICD-10-CM

## 2024-12-01 DIAGNOSIS — G89.29 CHRONIC RIGHT-SIDED LOW BACK PAIN WITHOUT SCIATICA: Primary | ICD-10-CM

## 2024-12-01 DIAGNOSIS — R60.0 BILATERAL LOWER EXTREMITY EDEMA: ICD-10-CM

## 2024-12-01 DIAGNOSIS — M54.50 CHRONIC RIGHT-SIDED LOW BACK PAIN WITHOUT SCIATICA: Primary | ICD-10-CM

## 2024-12-01 LAB
ANION GAP SERPL CALC-SCNC: 8 MMOL/L (ref 8–16)
BASOPHILS # BLD AUTO: 0.04 K/UL (ref 0–0.2)
BASOPHILS NFR BLD: 0.5 % (ref 0–1.9)
BUN SERPL-MCNC: 17 MG/DL (ref 8–23)
CALCIUM SERPL-MCNC: 9.3 MG/DL (ref 8.7–10.5)
CHLORIDE SERPL-SCNC: 105 MMOL/L (ref 95–110)
CO2 SERPL-SCNC: 28 MMOL/L (ref 23–29)
CREAT SERPL-MCNC: 1.5 MG/DL (ref 0.5–1.4)
DIFFERENTIAL METHOD BLD: ABNORMAL
EOSINOPHIL # BLD AUTO: 0.1 K/UL (ref 0–0.5)
EOSINOPHIL NFR BLD: 1.3 % (ref 0–8)
ERYTHROCYTE [DISTWIDTH] IN BLOOD BY AUTOMATED COUNT: 14 % (ref 11.5–14.5)
EST. GFR  (NO RACE VARIABLE): 51 ML/MIN/1.73 M^2
GLUCOSE SERPL-MCNC: 91 MG/DL (ref 70–110)
HCT VFR BLD AUTO: 40.2 % (ref 40–54)
HCV AB SERPL QL IA: NEGATIVE
HGB BLD-MCNC: 12.8 G/DL (ref 14–18)
HIV 1+2 AB+HIV1 P24 AG SERPL QL IA: NEGATIVE
IMM GRANULOCYTES # BLD AUTO: 0.03 K/UL (ref 0–0.04)
IMM GRANULOCYTES NFR BLD AUTO: 0.4 % (ref 0–0.5)
LYMPHOCYTES # BLD AUTO: 1.2 K/UL (ref 1–4.8)
LYMPHOCYTES NFR BLD: 16.3 % (ref 18–48)
MCH RBC QN AUTO: 28.1 PG (ref 27–31)
MCHC RBC AUTO-ENTMCNC: 31.8 G/DL (ref 32–36)
MCV RBC AUTO: 88 FL (ref 82–98)
MONOCYTES # BLD AUTO: 0.8 K/UL (ref 0.3–1)
MONOCYTES NFR BLD: 10.1 % (ref 4–15)
NEUTROPHILS # BLD AUTO: 5.4 K/UL (ref 1.8–7.7)
NEUTROPHILS NFR BLD: 71.4 % (ref 38–73)
NRBC BLD-RTO: 0 /100 WBC
PLATELET # BLD AUTO: 200 K/UL (ref 150–450)
PMV BLD AUTO: 9.4 FL (ref 9.2–12.9)
POTASSIUM SERPL-SCNC: 4.3 MMOL/L (ref 3.5–5.1)
RBC # BLD AUTO: 4.56 M/UL (ref 4.6–6.2)
SODIUM SERPL-SCNC: 141 MMOL/L (ref 136–145)
WBC # BLD AUTO: 7.5 K/UL (ref 3.9–12.7)

## 2024-12-01 PROCEDURE — 86803 HEPATITIS C AB TEST: CPT | Performed by: EMERGENCY MEDICINE

## 2024-12-01 PROCEDURE — 87389 HIV-1 AG W/HIV-1&-2 AB AG IA: CPT | Performed by: EMERGENCY MEDICINE

## 2024-12-01 PROCEDURE — 85025 COMPLETE CBC W/AUTO DIFF WBC: CPT | Performed by: EMERGENCY MEDICINE

## 2024-12-01 PROCEDURE — 93010 ELECTROCARDIOGRAM REPORT: CPT | Mod: ,,, | Performed by: INTERNAL MEDICINE

## 2024-12-01 PROCEDURE — 96374 THER/PROPH/DIAG INJ IV PUSH: CPT

## 2024-12-01 PROCEDURE — 80048 BASIC METABOLIC PNL TOTAL CA: CPT | Performed by: EMERGENCY MEDICINE

## 2024-12-01 PROCEDURE — 96375 TX/PRO/DX INJ NEW DRUG ADDON: CPT

## 2024-12-01 PROCEDURE — 63600175 PHARM REV CODE 636 W HCPCS: Performed by: EMERGENCY MEDICINE

## 2024-12-01 PROCEDURE — 93005 ELECTROCARDIOGRAM TRACING: CPT

## 2024-12-01 PROCEDURE — 99285 EMERGENCY DEPT VISIT HI MDM: CPT | Mod: 25

## 2024-12-01 RX ORDER — HYDROCODONE BITARTRATE AND ACETAMINOPHEN 5; 325 MG/1; MG/1
1 TABLET ORAL EVERY 4 HOURS PRN
Qty: 8 TABLET | Refills: 0 | Status: SHIPPED | OUTPATIENT
Start: 2024-12-01 | End: 2024-12-11

## 2024-12-01 RX ORDER — KETOROLAC TROMETHAMINE 30 MG/ML
15 INJECTION, SOLUTION INTRAMUSCULAR; INTRAVENOUS
Status: COMPLETED | OUTPATIENT
Start: 2024-12-01 | End: 2024-12-01

## 2024-12-01 RX ORDER — TIZANIDINE 4 MG/1
4 TABLET ORAL EVERY 6 HOURS PRN
Qty: 20 TABLET | Refills: 0 | Status: SHIPPED | OUTPATIENT
Start: 2024-12-01 | End: 2024-12-11

## 2024-12-01 RX ORDER — LIDOCAINE 50 MG/G
1 PATCH TOPICAL DAILY
Qty: 10 PATCH | Refills: 0 | Status: SHIPPED | OUTPATIENT
Start: 2024-12-01 | End: 2024-12-11

## 2024-12-01 RX ORDER — ORPHENADRINE CITRATE 30 MG/ML
30 INJECTION INTRAMUSCULAR; INTRAVENOUS
Status: COMPLETED | OUTPATIENT
Start: 2024-12-01 | End: 2024-12-01

## 2024-12-01 RX ADMIN — ORPHENADRINE CITRATE 30 MG: 60 INJECTION INTRAMUSCULAR; INTRAVENOUS at 07:12

## 2024-12-01 RX ADMIN — KETOROLAC TROMETHAMINE 15 MG: 30 INJECTION, SOLUTION INTRAMUSCULAR; INTRAVENOUS at 06:12

## 2024-12-01 NOTE — ED PROVIDER NOTES
Source of History:  The patient    Chief complaint:  Back Pain (Right side pain x 2 days hx of arthritis back. Foot swelling)      HPI:  Robinson Reardon Sr. is a 64 y.o. male  with history of congestive heart failure, COPD, osteoarthritis of the right hip and previous hip replacement as well as pulmonary embolus on blood thinners presents with a multitude of chronic complaints.  Complains of bilateral lower extremity edema for the last 2 months.  Also states he has worsening right hip pain and pain radiating down his right leg that has been going on for years.  He has had hip replacement due to osteoarthritis before.  He was seen made pain management for this.  He states he was here in the emergency department today mainly because he has had difficulty sleeping last couple of nights as the hip pain is continuing to bother him and he wants to figure out why this has been going on.    States he currently is on blood thinners for a pulmonary embolus.  He has been seen by his cardiologist in his followed closely at the [facility] for his lower extremity edema and had a recent echocardiogram for this.    This is the extent to the patients complaints today here in the emergency department.    ROS:   See HPI.    Review of patient's allergies indicates:   Allergen Reactions    Ace inhibitors Other (See Comments) and Swelling     angioedema  Face, lips, tongue swelling      Lisinopril Swelling    Betadine [povidone-iodine] Rash     Pt stated when he was donating blood years ago, skin turned another color       PMH:  As per HPI and below:  Past Medical History:   Diagnosis Date    Asthma     CHF (congestive heart failure)     COPD (chronic obstructive pulmonary disease)     History of CVA with residual deficit 2/17/2022    HLD (hyperlipidemia) 2/17/2022    Hyperlipidemia     Hypertension     Hyperthyroidism     Osteoarthritis of hip 2/17/2022    Sleep apnea 2017    Stroke     Unintentional weight loss 2/17/2022  "    Past Surgical History:   Procedure Laterality Date    [facility]      LAPAROSCOPIC BIOPSY OF LIVER Right 2021    Procedure: BIOPSY, LIVER, LAPAROSCOPIC;  Surgeon: Jsoe Vieyra Jr., MD;  Location: [facility] OR;  Service: General;  Laterality: Right;    LAPAROSCOPIC CHOLECYSTECTOMY N/A 2021    Procedure: CHOLECYSTECTOMY, LAPAROSCOPIC;  Surgeon: oJse Vieyra Jr., MD;  Location: [facility] OR;  Service: General;  Laterality: N/A;    TRANSFORAMINAL EPIDURAL INJECTION OF STEROID Bilateral 10/21/2024    Procedure: LUMBAR TRANSFORAMINAL BILATERAL L5/S1 DIRECTREFERRAL *ELIQUIS CLEARANCE REQUESTED*;  Surgeon: Laura Cline MD;  Location: [facility] PAIN MGT;  Service: Pain Management;  Laterality: Bilateral;  [phone]       Social History     Tobacco Use    Smoking status: Former     Current packs/day: 0.00     Average packs/day: 0.3 packs/day for 24.0 years (6.0 ttl pk-yrs)     Types: Cigarettes     Start date: 3/7/1994     Quit date: 3/7/2018     Years since quittin.7    Smokeless tobacco: Never   Substance Use Topics    Alcohol use: Not Currently    Drug use: Yes     Types: Marijuana     Comment: Hx of Crack cocaine 15 yrs       Physical Exam:    /66 (BP Location: Left arm)   Pulse 60   Temp 98.2 °F (36.8 °C) (Oral)   Resp 18   Ht 5' 11" (1.803 m)   Wt 116.6 kg (257 lb)   SpO2 95%   BMI 35.84 kg/m²   Nursing note and vital signs reviewed.  Constitutional: No acute distress.  Nontoxic morbidly obese  Eyes:  Conjunctiva normal.  Extraocular muscles are intact.  Cardiovascular: Regular rate and rhythm.  No murmurs. No gallops. No rubs 2 + pitting edema bilateral lower extremities.  No erythema.  Normal temperature.  Respiratory: Clear to auscultation bilaterally.  Good air movement.  No wheezes.  No rhonchi. No rales. No accessory muscle use..  Abdomen: Soft.  Not distended.  Nontender.  No guarding.  No rebound. Non-peritoneal.  Old exploratory laparotomy scar from [facility] 35 years ago.  Neuro: alert. At " baseline.  No focal neurological deficits.  MSK: No deformities.      Summary of Previous Medical Records:  11/22/2024 reviewed notes from patient's cardiologist on recent echocardiogram for the bilateral lower extremity edema.  Per the cardiology notes echocardiogram without significant abnormalities to explain the patient's leg swelling.  There is change of a diuretic from furosemide to bumetanide.  Also recommended stopping amlodipine and taking 2 spironolactone tablets.      Differential Dx/ MDM/ Workup:  64-year-old male with multiple comorbidities here with chronic complaints.  I spent in his significant amount of time discussing and helping the patient understand the capabilities of with the emergency department can do.  This was after the patient had stated I want you to figure out what is going on.  I did explain that with his swelling as well as his back pain that has been going on for months it will take time for this to improve and unfortunately will not be able to fix it immediately in the emergency department today.  Patient's vital signs are unremarkable.  Lower extremity edema is chronic likely due to the patient's weight poor vasculature as well as diet.  I discussed diet exercise and weight loss with him and he states he still eats a lot of rice and grits.  Patient is being managed looking at the records by his cardiologist with the [facility].  He shows no evidence of respiratory stress to suggest left-sided congestive heart failure.  Will get some blood work to rule out electrolyte abnormalities or any kidney insufficiency that can contribute to this.  Will also give analgesia as well as muscle relaxant for his pain in his hip at this time.  Will get an x-ray of the hip.  I do suspect likely sciatica in this fact.  The patient has already had a previous hip replacement for osteoarthritis in his right hip.  Patient did is on blood thinners already for pulmonary embolus and he shows no  evidence of severe respiratory distress that necessitates any further workup regarding this.  I have also considered but do not suspect acute coronary syndrome or COPD exacerbation.      ED Course as of 12/01/24 0736   Sun Dec 01, 2024   0636 EKG 12-lead  EKG independently interpreted by myself shows sinus bradycardia rate of 58, normal intervals, narrow QRS, no acute ST T wave abnormalities.  Compared to an EKG on August 14, 2024 shows no significant change. [SM]   0700 WBC: 7.50 [SM]   0700 Hemoglobin(!): 12.8 [SM]   0700 Platelet Count: 200 [SM]   0709 Potassium: 4.3 [SM]   0711 Sodium: 141 [SM]   0711 Creatinine(!): 1.5  Baseline 1.35-1.6 [SM]   0712 X-Ray Lumbar Spine Ap And Lateral  X-ray of the lumbar spine independently interpreted by myself shows good alignment.  Significant decreased joint spacing L4 over L5 and L5 over S1 consistent with likely degenerative disc disease.  No fracture or spondylolisthesis seen.  Osteophytes noted. [SM]   0712 X-Ray Hip 2 or 3 views Right with Pelvis when performed  X-ray of the right hip and pelvis independently interpreted by myself shows previous hip replacement.  No dislocation or malalignment.  No acute process. [SM]   0732 Discussed the findings with the patient.  He was sitting at bedside.  He has a appointments coming up this week with primary care as well as Cardiology.  I do not feel there is need for hospitalization at this time.  Will discharge home with prescription for analgesia.  Also see if we can get him some compression stockings.  I advised that he closely follow up with the primary care as well as pain management and Cardiology.    Patient understands and all questions has been answered. [SM]      ED Course User Index  [SM] Tomi Billings, DO                 Diagnostic Impression:    1. Chronic right-sided low back pain without sciatica    2. Right hip pain    3. Bilateral lower extremity edema         ED Disposition Condition    Discharge Stable             ED Prescriptions       Medication Sig Dispense Start Date End Date Auth. Provider    LIDOcaine (LIDODERM) 5 % Place 1 patch onto the skin once daily. Remove & Discard patch within 12 hours or as directed by MD for 10 days 10 patch 12/1/2024 12/11/2024 Tomi Billings, DO    tiZANidine (ZANAFLEX) 4 MG tablet Take 1 tablet (4 mg total) by mouth every 6 (six) hours as needed. 20 tablet 12/1/2024 12/11/2024 Tomi Billings, DO    HYDROcodone-acetaminophen (NORCO) 5-325 mg per tablet Take 1 tablet by mouth every 4 (four) hours as needed for Pain. 8 tablet 12/1/2024 12/11/2024 Tomi Billings, DO          Follow-up Information       Follow up With Specialties Details Why Contact Info    [facility]    [address] [facility] [address]  [phone]               Tomi Billings DO  12/01/24 0736

## 2024-12-01 NOTE — ED NOTES
C/o chronic back pain as well as rigth hip pain. States pain worsened the past three days, exceptionally bad this morning from sleeping. Denies recent injury, states pain meds aren't helping.  Also c/o BLE swelling and some SOB, BLE swelling for past two months. No CP, reports he has been compliant with his diuretics.   Not otherwise distressed, denies any needs.

## 2024-12-02 LAB
OHS QRS DURATION: 94 MS
OHS QTC CALCULATION: 465 MS

## 2024-12-18 ENCOUNTER — OFFICE VISIT (OUTPATIENT)
Dept: PODIATRY | Facility: CLINIC | Age: 64
End: 2024-12-18
Payer: MEDICARE

## 2024-12-18 VITALS
HEIGHT: 71 IN | BODY MASS INDEX: 35.99 KG/M2 | SYSTOLIC BLOOD PRESSURE: 130 MMHG | DIASTOLIC BLOOD PRESSURE: 80 MMHG | HEART RATE: 64 BPM | WEIGHT: 257.06 LBS

## 2024-12-18 DIAGNOSIS — M79.674 TOE PAIN, RIGHT: ICD-10-CM

## 2024-12-18 DIAGNOSIS — Z79.01 LONG TERM CURRENT USE OF ANTICOAGULANT THERAPY: ICD-10-CM

## 2024-12-18 DIAGNOSIS — B35.1 ONYCHOMYCOSIS DUE TO DERMATOPHYTE: Primary | ICD-10-CM

## 2024-12-18 PROCEDURE — 99999 PR PBB SHADOW E&M-EST. PATIENT-LVL IV: CPT | Mod: PBBFAC,,, | Performed by: PODIATRIST

## 2024-12-18 RX ORDER — CICLOPIROX 80 MG/ML
SOLUTION TOPICAL NIGHTLY
Qty: 6.6 ML | Refills: 11 | Status: SHIPPED | OUTPATIENT
Start: 2024-12-18

## 2024-12-18 NOTE — PROGRESS NOTES
Subjective:      Patient ID: Robinson Reardon Sr. is a 64 y.o. male.    Chief Complaint: Nail Care (Eliquis )    Thick discolored misshapen toenails all toes.  Chronic condition.  This exacerbation is Gradual onset, worsening over past several weeks, aggravated by increased weight bearing, shoe gear, pressure.  No previous medical treatment.  OTC pain med not helping.       Review of Systems   Constitutional: Negative for chills, diaphoresis, fever, malaise/fatigue and night sweats.   Cardiovascular:  Positive for leg swelling. Negative for claudication, cyanosis and syncope.   Skin:  Positive for nail changes. Negative for color change, dry skin, rash, suspicious lesions and unusual hair distribution.   Musculoskeletal:  Negative for falls, joint pain, joint swelling, muscle cramps, muscle weakness and stiffness.   Gastrointestinal:  Negative for constipation, diarrhea, nausea and vomiting.   Neurological:  Positive for paresthesias and sensory change. Negative for brief paralysis, disturbances in coordination, focal weakness, numbness and tremors.         Objective:      Physical Exam  Constitutional:       General: He is not in acute distress.     Appearance: He is well-developed. He is not diaphoretic.   Cardiovascular:      Pulses:           Popliteal pulses are 2+ on the right side and 2+ on the left side.        Dorsalis pedis pulses are 2+ on the right side and 2+ on the left side.        Posterior tibial pulses are 2+ on the right side and 2+ on the left side.      Comments: Capillary refill 3 seconds all toes/distal feet, all toes/both feet warm to touch.      Negative lymphadenopathy bilateral popliteal fossa and tarsal tunnel.     <2+_ pitting lower extremity edema bilateral.    Musculoskeletal:      Right ankle: No swelling, deformity, ecchymosis or lacerations. Normal range of motion. Normal pulse.      Right Achilles Tendon: Normal. No defects. Bowers's test negative.      Comments: Walks with cane for  stability due to hip replacement right 2 years.   Lymphadenopathy:      Lower Body: No right inguinal adenopathy. No left inguinal adenopathy.      Comments: Negative lymphadenopathy bilateral popliteal fossa and tarsal tunnel.    Negative lymphangitic streaking bilateral feet/ankles/legs.   Skin:     General: Skin is warm and dry.      Capillary Refill: Capillary refill takes 2 to 3 seconds.      Coloration: Skin is not pale.      Findings: No abrasion, bruising, burn, ecchymosis, erythema, laceration, lesion or rash.      Nails: There is no clubbing.      Comments:   Skin thin, shiny, atrophic, with decreased density and distribution of pedal hair bilateral, but without hyperpigmentation, dane discoloration,  ulcers, masses, nodules or cords palpated bilateral feet and legs.    Toenails 1st, 2nd, 3rd, 4th, 5th  bilateral are hypertrophic thickened 2-3 mm, dystrophic, discolored tanish brown with tan, gray crumbly subungual debris.  Tender to distal nail plate pressure, without periungual skin abnormality of each.    Neurological:      Mental Status: He is alert and oriented to person, place, and time.      Sensory: No sensory deficit.      Motor: No tremor, atrophy or abnormal muscle tone.      Gait: Gait normal.      Comments: Paresthesias, and burning bilateral feet with no clearly identified trigger or source.    Negative tinel sign to percussion sural, superficial peroneal, deep peroneal, saphenous, and posterior tibial nerves right and left ankles and feet.     Psychiatric:         Behavior: Behavior is cooperative.           Assessment:       Encounter Diagnoses   Name Primary?    Onychomycosis due to dermatophyte Yes    Long term current use of anticoagulant therapy     Toe pain, right          Plan:       Robinson was seen today for nail care.    Diagnoses and all orders for this visit:    Onychomycosis due to dermatophyte    Long term current use of anticoagulant therapy    Toe pain, right    Other  orders  -     ciclopirox (PENLAC) 8 % Soln; Apply topically nightly.      I counseled the patient on his conditions, their implications and medical management.        Inspect feet multiple times daily for signs of occurrence/recurrence ulceration.    With the patient's permission, I debrided all ten toenails with a sterile nipper and curette, removing all offending nail and debris.  Patient tolerated the procedure well and related significant relief.    Iglesialac              No follow-ups on file.

## 2025-01-13 ENCOUNTER — HOSPITAL ENCOUNTER (EMERGENCY)
Facility: OTHER | Age: 65
Discharge: HOME | End: 2025-01-13
Payer: MEDICARE

## 2025-01-13 VITALS
OXYGEN SATURATION: 94 % | BODY MASS INDEX: 37.1 KG/M2 | RESPIRATION RATE: 20 BRPM | TEMPERATURE: 99 F | DIASTOLIC BLOOD PRESSURE: 67 MMHG | SYSTOLIC BLOOD PRESSURE: 112 MMHG | HEIGHT: 71 IN | HEART RATE: 77 BPM | WEIGHT: 265 LBS

## 2025-01-13 DIAGNOSIS — R07.9 CHEST PAIN: ICD-10-CM

## 2025-01-13 DIAGNOSIS — R06.02 SOB (SHORTNESS OF BREATH): Primary | ICD-10-CM

## 2025-01-13 LAB
ALBUMIN SERPL BCP-MCNC: 3.4 G/DL (ref 3.5–5.2)
ALP SERPL-CCNC: 65 U/L (ref 40–150)
ALT SERPL W/O P-5'-P-CCNC: 18 U/L (ref 10–44)
ANION GAP SERPL CALC-SCNC: 12 MMOL/L (ref 8–16)
AST SERPL-CCNC: 22 U/L (ref 10–40)
BASOPHILS # BLD AUTO: 0.04 K/UL (ref 0–0.2)
BASOPHILS NFR BLD: 0.4 % (ref 0–1.9)
BILIRUB SERPL-MCNC: 0.5 MG/DL (ref 0.1–1)
BNP SERPL-MCNC: <10 PG/ML (ref 0–99)
BUN SERPL-MCNC: 15 MG/DL (ref 8–23)
CALCIUM SERPL-MCNC: 9.6 MG/DL (ref 8.7–10.5)
CHLORIDE SERPL-SCNC: 103 MMOL/L (ref 95–110)
CO2 SERPL-SCNC: 23 MMOL/L (ref 23–29)
CREAT SERPL-MCNC: 1.7 MG/DL (ref 0.5–1.4)
CTP QC/QA: YES
DIFFERENTIAL METHOD BLD: ABNORMAL
EOSINOPHIL # BLD AUTO: 0.1 K/UL (ref 0–0.5)
EOSINOPHIL NFR BLD: 0.9 % (ref 0–8)
ERYTHROCYTE [DISTWIDTH] IN BLOOD BY AUTOMATED COUNT: 13.4 % (ref 11.5–14.5)
EST. GFR  (NO RACE VARIABLE): 44 ML/MIN/1.73 M^2
GLUCOSE SERPL-MCNC: 89 MG/DL (ref 70–110)
HCT VFR BLD AUTO: 43.2 % (ref 40–54)
HGB BLD-MCNC: 13.6 G/DL (ref 14–18)
IMM GRANULOCYTES # BLD AUTO: 0.04 K/UL (ref 0–0.04)
IMM GRANULOCYTES NFR BLD AUTO: 0.4 % (ref 0–0.5)
LYMPHOCYTES # BLD AUTO: 1.8 K/UL (ref 1–4.8)
LYMPHOCYTES NFR BLD: 19.9 % (ref 18–48)
MCH RBC QN AUTO: 27.3 PG (ref 27–31)
MCHC RBC AUTO-ENTMCNC: 31.5 G/DL (ref 32–36)
MCV RBC AUTO: 87 FL (ref 82–98)
MONOCYTES # BLD AUTO: 0.8 K/UL (ref 0.3–1)
MONOCYTES NFR BLD: 8.6 % (ref 4–15)
NEUTROPHILS # BLD AUTO: 6.3 K/UL (ref 1.8–7.7)
NEUTROPHILS NFR BLD: 69.8 % (ref 38–73)
NRBC BLD-RTO: 0 /100 WBC
PLATELET # BLD AUTO: 218 K/UL (ref 150–450)
PMV BLD AUTO: 9.4 FL (ref 9.2–12.9)
POC MOLECULAR INFLUENZA A AGN: NEGATIVE
POC MOLECULAR INFLUENZA B AGN: NEGATIVE
POTASSIUM SERPL-SCNC: 3.7 MMOL/L (ref 3.5–5.1)
PROT SERPL-MCNC: 7.1 G/DL (ref 6–8.4)
RBC # BLD AUTO: 4.99 M/UL (ref 4.6–6.2)
SODIUM SERPL-SCNC: 138 MMOL/L (ref 136–145)
TROPONIN I SERPL DL<=0.01 NG/ML-MCNC: 0.03 NG/ML (ref 0–0.03)
WBC # BLD AUTO: 9.08 K/UL (ref 3.9–12.7)

## 2025-01-13 PROCEDURE — 83880 ASSAY OF NATRIURETIC PEPTIDE: CPT

## 2025-01-13 PROCEDURE — 94761 N-INVAS EAR/PLS OXIMETRY MLT: CPT

## 2025-01-13 PROCEDURE — 85025 COMPLETE CBC W/AUTO DIFF WBC: CPT

## 2025-01-13 PROCEDURE — 25000003 PHARM REV CODE 250

## 2025-01-13 PROCEDURE — 96374 THER/PROPH/DIAG INJ IV PUSH: CPT

## 2025-01-13 PROCEDURE — 25500020 PHARM REV CODE 255

## 2025-01-13 PROCEDURE — 80053 COMPREHEN METABOLIC PANEL: CPT

## 2025-01-13 PROCEDURE — 99285 EMERGENCY DEPT VISIT HI MDM: CPT | Mod: 25

## 2025-01-13 PROCEDURE — 94640 AIRWAY INHALATION TREATMENT: CPT | Mod: XB

## 2025-01-13 PROCEDURE — 84484 ASSAY OF TROPONIN QUANT: CPT

## 2025-01-13 PROCEDURE — 93005 ELECTROCARDIOGRAM TRACING: CPT

## 2025-01-13 PROCEDURE — 63600175 PHARM REV CODE 636 W HCPCS

## 2025-01-13 PROCEDURE — 93010 ELECTROCARDIOGRAM REPORT: CPT | Mod: ,,, | Performed by: INTERNAL MEDICINE

## 2025-01-13 PROCEDURE — 25000242 PHARM REV CODE 250 ALT 637 W/ HCPCS

## 2025-01-13 RX ORDER — LIDOCAINE 50 MG/G
1 PATCH TOPICAL
Status: DISCONTINUED | OUTPATIENT
Start: 2025-01-13 | End: 2025-01-14 | Stop reason: HOSPADM

## 2025-01-13 RX ORDER — ACETAMINOPHEN 500 MG
1000 TABLET ORAL
Status: COMPLETED | OUTPATIENT
Start: 2025-01-13 | End: 2025-01-13

## 2025-01-13 RX ORDER — ALBUTEROL SULFATE 2.5 MG/.5ML
2.5 SOLUTION RESPIRATORY (INHALATION)
Status: COMPLETED | OUTPATIENT
Start: 2025-01-13 | End: 2025-01-13

## 2025-01-13 RX ORDER — LIDOCAINE 50 MG/G
1 PATCH TOPICAL DAILY
Qty: 9 PATCH | Refills: 0 | Status: SHIPPED | OUTPATIENT
Start: 2025-01-13 | End: 2025-01-26

## 2025-01-13 RX ORDER — IPRATROPIUM BROMIDE AND ALBUTEROL SULFATE 2.5; .5 MG/3ML; MG/3ML
3 SOLUTION RESPIRATORY (INHALATION)
Status: COMPLETED | OUTPATIENT
Start: 2025-01-13 | End: 2025-01-13

## 2025-01-13 RX ORDER — LIDOCAINE 50 MG/G
1 PATCH TOPICAL
Status: DISCONTINUED | OUTPATIENT
Start: 2025-01-13 | End: 2025-01-13

## 2025-01-13 RX ORDER — ASPIRIN 325 MG
325 TABLET ORAL
Status: DISCONTINUED | OUTPATIENT
Start: 2025-01-13 | End: 2025-01-14 | Stop reason: HOSPADM

## 2025-01-13 RX ORDER — ONDANSETRON HYDROCHLORIDE 2 MG/ML
4 INJECTION, SOLUTION INTRAVENOUS
Status: COMPLETED | OUTPATIENT
Start: 2025-01-13 | End: 2025-01-13

## 2025-01-13 RX ADMIN — IPRATROPIUM BROMIDE AND ALBUTEROL SULFATE 3 ML: .5; 3 SOLUTION RESPIRATORY (INHALATION) at 09:01

## 2025-01-13 RX ADMIN — IOHEXOL 100 ML: 350 INJECTION, SOLUTION INTRAVENOUS at 07:01

## 2025-01-13 RX ADMIN — ONDANSETRON 4 MG: 2 INJECTION INTRAMUSCULAR; INTRAVENOUS at 07:01

## 2025-01-13 RX ADMIN — ALBUTEROL SULFATE 2.5 MG: 2.5 SOLUTION RESPIRATORY (INHALATION) at 09:01

## 2025-01-13 RX ADMIN — ACETAMINOPHEN 1000 MG: 500 TABLET, FILM COATED ORAL at 07:01

## 2025-01-13 RX ADMIN — LIDOCAINE 1 PATCH: 50 PATCH CUTANEOUS at 10:01

## 2025-01-13 RX ADMIN — LIDOCAINE 1 PATCH: 50 PATCH CUTANEOUS at 09:01
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
## 2025-01-14 DIAGNOSIS — Z96.641 STATUS POST RIGHT HIP REPLACEMENT: ICD-10-CM

## 2025-01-14 DIAGNOSIS — M25.551 RIGHT HIP PAIN: Primary | ICD-10-CM

## 2025-01-14 LAB
OHS QRS DURATION: 94 MS
OHS QTC CALCULATION: 414 MS

## 2025-01-14 NOTE — ED NOTES
ambulatory pulse ox test  Pt baseline 94% lowest 92% while walking.  Pt stood and walked briskly under own power. Reported no lightheadedness, dizziness, or sob.
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
## 2025-01-14 NOTE — DISCHARGE INSTRUCTIONS
Please schedule an appointment with your PCP/cardiologist at [place] within the next week for follow up.    Please continue to take at home breathing treatments as needed. Continue to monitor your symptoms.     I have sent a prescription for lidocaine patches to your pharmacy at [facility]. Lidoacine patches should only cost you $4.

## 2025-01-22 ENCOUNTER — HOSPITAL ENCOUNTER (INPATIENT)
Facility: OTHER | Age: 65
LOS: 2 days | Discharge: HOME | DRG: 194 | End: 2025-01-24
Attending: EMERGENCY MEDICINE | Admitting: HOSPITALIST
Payer: MEDICARE

## 2025-01-22 DIAGNOSIS — R09.02 HYPOXIA: ICD-10-CM

## 2025-01-22 DIAGNOSIS — J11.1 INFLUENZA: ICD-10-CM

## 2025-01-22 DIAGNOSIS — R06.00 DYSPNEA, UNSPECIFIED TYPE: ICD-10-CM

## 2025-01-22 DIAGNOSIS — S31.829A WOUND OF LEFT BUTTOCK, INITIAL ENCOUNTER: ICD-10-CM

## 2025-01-22 DIAGNOSIS — J18.9 COMMUNITY ACQUIRED PNEUMONIA, UNSPECIFIED LATERALITY: Primary | ICD-10-CM

## 2025-01-22 PROBLEM — J44.1 COPD EXACERBATION: Status: ACTIVE | Noted: 2025-01-22

## 2025-01-22 PROBLEM — N18.30 CKD (CHRONIC KIDNEY DISEASE) STAGE 3, GFR 30-59 ML/MIN: Status: ACTIVE | Noted: 2025-01-22

## 2025-01-22 PROBLEM — J10.1 INFLUENZA A: Status: ACTIVE | Noted: 2025-01-22

## 2025-01-22 LAB
ALBUMIN SERPL BCP-MCNC: 3.4 G/DL (ref 3.5–5.2)
ALP SERPL-CCNC: 66 U/L (ref 40–150)
ALT SERPL W/O P-5'-P-CCNC: 27 U/L (ref 10–44)
ANION GAP SERPL CALC-SCNC: 13 MMOL/L (ref 8–16)
AST SERPL-CCNC: 33 U/L (ref 10–40)
BASOPHILS # BLD AUTO: 0.02 K/UL (ref 0–0.2)
BASOPHILS NFR BLD: 0.3 % (ref 0–1.9)
BILIRUB SERPL-MCNC: 0.8 MG/DL (ref 0.1–1)
BILIRUB UR QL STRIP: NEGATIVE
BNP SERPL-MCNC: <10 PG/ML (ref 0–99)
BUN SERPL-MCNC: 12 MG/DL (ref 8–23)
CALCIUM SERPL-MCNC: 9.6 MG/DL (ref 8.7–10.5)
CHLORIDE SERPL-SCNC: 100 MMOL/L (ref 95–110)
CLARITY UR: CLEAR
CO2 SERPL-SCNC: 24 MMOL/L (ref 23–29)
COLOR UR: YELLOW
CREAT SERPL-MCNC: 1.6 MG/DL (ref 0.5–1.4)
CTP QC/QA: YES
CTP QC/QA: YES
DIFFERENTIAL METHOD BLD: ABNORMAL
EOSINOPHIL # BLD AUTO: 0 K/UL (ref 0–0.5)
EOSINOPHIL NFR BLD: 0 % (ref 0–8)
ERYTHROCYTE [DISTWIDTH] IN BLOOD BY AUTOMATED COUNT: 13.8 % (ref 11.5–14.5)
EST. GFR  (NO RACE VARIABLE): 47 ML/MIN/1.73 M^2
GLUCOSE SERPL-MCNC: 91 MG/DL (ref 70–110)
GLUCOSE UR QL STRIP: NEGATIVE
HCT VFR BLD AUTO: 42.8 % (ref 40–54)
HGB BLD-MCNC: 14.5 G/DL (ref 14–18)
HGB UR QL STRIP: NEGATIVE
IMM GRANULOCYTES # BLD AUTO: 0.02 K/UL (ref 0–0.04)
IMM GRANULOCYTES NFR BLD AUTO: 0.3 % (ref 0–0.5)
KETONES UR QL STRIP: ABNORMAL
LDH SERPL L TO P-CCNC: 1.09 MMOL/L (ref 0.5–2.2)
LEUKOCYTE ESTERASE UR QL STRIP: NEGATIVE
LYMPHOCYTES # BLD AUTO: 0.8 K/UL (ref 1–4.8)
LYMPHOCYTES NFR BLD: 10.7 % (ref 18–48)
MCH RBC QN AUTO: 28.9 PG (ref 27–31)
MCHC RBC AUTO-ENTMCNC: 33.9 G/DL (ref 32–36)
MCV RBC AUTO: 85 FL (ref 82–98)
MONOCYTES # BLD AUTO: 1.1 K/UL (ref 0.3–1)
MONOCYTES NFR BLD: 14.2 % (ref 4–15)
NEUTROPHILS # BLD AUTO: 5.9 K/UL (ref 1.8–7.7)
NEUTROPHILS NFR BLD: 74.5 % (ref 38–73)
NITRITE UR QL STRIP: NEGATIVE
NRBC BLD-RTO: 0 /100 WBC
PH UR STRIP: 6 [PH] (ref 5–8)
PLATELET # BLD AUTO: 235 K/UL (ref 150–450)
PMV BLD AUTO: 9.8 FL (ref 9.2–12.9)
POC MOLECULAR INFLUENZA A AGN: POSITIVE
POC MOLECULAR INFLUENZA B AGN: NEGATIVE
POTASSIUM SERPL-SCNC: 3.5 MMOL/L (ref 3.5–5.1)
PROT SERPL-MCNC: 7.4 G/DL (ref 6–8.4)
PROT UR QL STRIP: ABNORMAL
RBC # BLD AUTO: 5.01 M/UL (ref 4.6–6.2)
SAMPLE: NORMAL
SARS-COV-2 RDRP RESP QL NAA+PROBE: NEGATIVE
SODIUM SERPL-SCNC: 137 MMOL/L (ref 136–145)
SP GR UR STRIP: 1.02 (ref 1–1.03)
TROPONIN I SERPL DL<=0.01 NG/ML-MCNC: 0.08 NG/ML (ref 0–0.03)
URN SPEC COLLECT METH UR: ABNORMAL
UROBILINOGEN UR STRIP-ACNC: ABNORMAL EU/DL
WBC # BLD AUTO: 7.86 K/UL (ref 3.9–12.7)

## 2025-01-22 PROCEDURE — 63600175 PHARM REV CODE 636 W HCPCS: Performed by: EMERGENCY MEDICINE

## 2025-01-22 PROCEDURE — 83880 ASSAY OF NATRIURETIC PEPTIDE: CPT | Performed by: EMERGENCY MEDICINE

## 2025-01-22 PROCEDURE — 84484 ASSAY OF TROPONIN QUANT: CPT | Performed by: EMERGENCY MEDICINE

## 2025-01-22 PROCEDURE — 99285 EMERGENCY DEPT VISIT HI MDM: CPT | Mod: 25

## 2025-01-22 PROCEDURE — 12000002 HC ACUTE/MED SURGE SEMI-PRIVATE ROOM

## 2025-01-22 PROCEDURE — 96365 THER/PROPH/DIAG IV INF INIT: CPT

## 2025-01-22 PROCEDURE — 94640 AIRWAY INHALATION TREATMENT: CPT

## 2025-01-22 PROCEDURE — 81003 URINALYSIS AUTO W/O SCOPE: CPT | Performed by: EMERGENCY MEDICINE

## 2025-01-22 PROCEDURE — 93005 ELECTROCARDIOGRAM TRACING: CPT

## 2025-01-22 PROCEDURE — 94761 N-INVAS EAR/PLS OXIMETRY MLT: CPT

## 2025-01-22 PROCEDURE — 25000242 PHARM REV CODE 250 ALT 637 W/ HCPCS: Performed by: EMERGENCY MEDICINE

## 2025-01-22 PROCEDURE — 25000003 PHARM REV CODE 250: Performed by: EMERGENCY MEDICINE

## 2025-01-22 PROCEDURE — 96375 TX/PRO/DX INJ NEW DRUG ADDON: CPT

## 2025-01-22 PROCEDURE — 80053 COMPREHEN METABOLIC PANEL: CPT | Performed by: EMERGENCY MEDICINE

## 2025-01-22 PROCEDURE — 87635 SARS-COV-2 COVID-19 AMP PRB: CPT | Performed by: EMERGENCY MEDICINE

## 2025-01-22 PROCEDURE — 93010 ELECTROCARDIOGRAM REPORT: CPT | Mod: ,,, | Performed by: INTERNAL MEDICINE

## 2025-01-22 PROCEDURE — 85025 COMPLETE CBC W/AUTO DIFF WBC: CPT | Performed by: EMERGENCY MEDICINE

## 2025-01-22 PROCEDURE — 87040 BLOOD CULTURE FOR BACTERIA: CPT | Performed by: EMERGENCY MEDICINE

## 2025-01-22 RX ORDER — AMLODIPINE BESYLATE 5 MG/1
5 TABLET ORAL DAILY
Status: DISCONTINUED | OUTPATIENT
Start: 2025-01-23 | End: 2025-01-24 | Stop reason: HOSPADM

## 2025-01-22 RX ORDER — MONTELUKAST SODIUM 10 MG/1
10 TABLET ORAL NIGHTLY
Status: DISCONTINUED | OUTPATIENT
Start: 2025-01-23 | End: 2025-01-24 | Stop reason: HOSPADM

## 2025-01-22 RX ORDER — OSELTAMIVIR PHOSPHATE 75 MG/1
75 CAPSULE ORAL DAILY
Status: DISCONTINUED | OUTPATIENT
Start: 2025-01-23 | End: 2025-01-24 | Stop reason: HOSPADM

## 2025-01-22 RX ORDER — CEFTRIAXONE 1 G/1
1 INJECTION, POWDER, FOR SOLUTION INTRAMUSCULAR; INTRAVENOUS
Status: DISCONTINUED | OUTPATIENT
Start: 2025-01-23 | End: 2025-01-24 | Stop reason: HOSPADM

## 2025-01-22 RX ORDER — ACETAMINOPHEN 325 MG/1
650 TABLET ORAL
Status: COMPLETED | OUTPATIENT
Start: 2025-01-22 | End: 2025-01-22

## 2025-01-22 RX ORDER — OSELTAMIVIR PHOSPHATE 75 MG/1
75 CAPSULE ORAL
Status: COMPLETED | OUTPATIENT
Start: 2025-01-22 | End: 2025-01-22

## 2025-01-22 RX ORDER — ATORVASTATIN CALCIUM 20 MG/1
40 TABLET, FILM COATED ORAL DAILY
Status: DISCONTINUED | OUTPATIENT
Start: 2025-01-23 | End: 2025-01-24 | Stop reason: HOSPADM

## 2025-01-22 RX ORDER — FINASTERIDE 5 MG/1
5 TABLET, FILM COATED ORAL DAILY
Status: DISCONTINUED | OUTPATIENT
Start: 2025-01-23 | End: 2025-01-24 | Stop reason: HOSPADM

## 2025-01-22 RX ORDER — CEFTRIAXONE 2 G/1
2 INJECTION, POWDER, FOR SOLUTION INTRAMUSCULAR; INTRAVENOUS ONCE
Status: COMPLETED | OUTPATIENT
Start: 2025-01-22 | End: 2025-01-22

## 2025-01-22 RX ORDER — SPIRONOLACTONE 25 MG/1
50 TABLET ORAL DAILY
Status: DISCONTINUED | OUTPATIENT
Start: 2025-01-23 | End: 2025-01-24 | Stop reason: HOSPADM

## 2025-01-22 RX ORDER — IBUPROFEN 200 MG
16 TABLET ORAL
Status: DISCONTINUED | OUTPATIENT
Start: 2025-01-23 | End: 2025-01-24 | Stop reason: HOSPADM

## 2025-01-22 RX ORDER — LEVOTHYROXINE SODIUM 50 UG/1
50 TABLET ORAL
Status: DISCONTINUED | OUTPATIENT
Start: 2025-01-23 | End: 2025-01-24 | Stop reason: HOSPADM

## 2025-01-22 RX ORDER — NALOXONE HCL 0.4 MG/ML
0.02 VIAL (ML) INJECTION
Status: DISCONTINUED | OUTPATIENT
Start: 2025-01-23 | End: 2025-01-24 | Stop reason: HOSPADM

## 2025-01-22 RX ORDER — ONDANSETRON HYDROCHLORIDE 2 MG/ML
4 INJECTION, SOLUTION INTRAVENOUS
Status: COMPLETED | OUTPATIENT
Start: 2025-01-22 | End: 2025-01-22

## 2025-01-22 RX ORDER — METHYLPREDNISOLONE SOD SUCC 125 MG
125 VIAL (EA) INJECTION
Status: COMPLETED | OUTPATIENT
Start: 2025-01-22 | End: 2025-01-22

## 2025-01-22 RX ORDER — IPRATROPIUM BROMIDE 0.5 MG/2.5ML
0.5 SOLUTION RESPIRATORY (INHALATION) ONCE
Status: COMPLETED | OUTPATIENT
Start: 2025-01-22 | End: 2025-01-22

## 2025-01-22 RX ORDER — PREDNISONE 50 MG/1
50 TABLET ORAL DAILY
Status: DISCONTINUED | OUTPATIENT
Start: 2025-01-23 | End: 2025-01-23

## 2025-01-22 RX ORDER — BUMETANIDE 1 MG/1
2 TABLET ORAL 2 TIMES DAILY
Status: DISCONTINUED | OUTPATIENT
Start: 2025-01-23 | End: 2025-01-24 | Stop reason: HOSPADM

## 2025-01-22 RX ORDER — FLUTICASONE FUROATE, UMECLIDINIUM BROMIDE AND VILANTEROL TRIFENATATE 200; 62.5; 25 UG/1; UG/1; UG/1
1 POWDER RESPIRATORY (INHALATION) DAILY
COMMUNITY
Start: 2024-11-04

## 2025-01-22 RX ORDER — IBUPROFEN 200 MG
24 TABLET ORAL
Status: DISCONTINUED | OUTPATIENT
Start: 2025-01-23 | End: 2025-01-24 | Stop reason: HOSPADM

## 2025-01-22 RX ORDER — ACETAMINOPHEN 325 MG/1
650 TABLET ORAL EVERY 4 HOURS PRN
Status: DISCONTINUED | OUTPATIENT
Start: 2025-01-23 | End: 2025-01-24 | Stop reason: HOSPADM

## 2025-01-22 RX ORDER — ALBUTEROL SULFATE 2.5 MG/.5ML
5 SOLUTION RESPIRATORY (INHALATION)
Status: COMPLETED | OUTPATIENT
Start: 2025-01-22 | End: 2025-01-22

## 2025-01-22 RX ORDER — METOPROLOL SUCCINATE 25 MG/1
25 TABLET, EXTENDED RELEASE ORAL DAILY
Status: DISCONTINUED | OUTPATIENT
Start: 2025-01-23 | End: 2025-01-24 | Stop reason: HOSPADM

## 2025-01-22 RX ORDER — SODIUM CHLORIDE 0.9 % (FLUSH) 0.9 %
10 SYRINGE (ML) INJECTION EVERY 8 HOURS PRN
Status: DISCONTINUED | OUTPATIENT
Start: 2025-01-23 | End: 2025-01-24 | Stop reason: HOSPADM

## 2025-01-22 RX ORDER — ONDANSETRON HYDROCHLORIDE 2 MG/ML
4 INJECTION, SOLUTION INTRAVENOUS EVERY 8 HOURS PRN
Status: DISCONTINUED | OUTPATIENT
Start: 2025-01-23 | End: 2025-01-24 | Stop reason: HOSPADM

## 2025-01-22 RX ORDER — GLUCAGON 1 MG
1 KIT INJECTION
Status: DISCONTINUED | OUTPATIENT
Start: 2025-01-23 | End: 2025-01-24 | Stop reason: HOSPADM

## 2025-01-22 RX ORDER — GABAPENTIN 300 MG/1
600 CAPSULE ORAL 3 TIMES DAILY
Status: DISCONTINUED | OUTPATIENT
Start: 2025-01-23 | End: 2025-01-24 | Stop reason: HOSPADM

## 2025-01-22 RX ORDER — IPRATROPIUM BROMIDE AND ALBUTEROL SULFATE 2.5; .5 MG/3ML; MG/3ML
3 SOLUTION RESPIRATORY (INHALATION) EVERY 4 HOURS
Status: DISCONTINUED | OUTPATIENT
Start: 2025-01-23 | End: 2025-01-23

## 2025-01-22 RX ORDER — TAMSULOSIN HYDROCHLORIDE 0.4 MG/1
0.8 CAPSULE ORAL DAILY
Status: DISCONTINUED | OUTPATIENT
Start: 2025-01-23 | End: 2025-01-24 | Stop reason: HOSPADM

## 2025-01-22 RX ADMIN — OSELTAMIVIR PHOSPHATE 75 MG: 75 CAPSULE ORAL at 10:01

## 2025-01-22 RX ADMIN — METHYLPREDNISOLONE SODIUM SUCCINATE 125 MG: 125 INJECTION, POWDER, FOR SOLUTION INTRAMUSCULAR; INTRAVENOUS at 10:01

## 2025-01-22 RX ADMIN — CEFTRIAXONE SODIUM 2 G: 2 INJECTION, POWDER, FOR SOLUTION INTRAMUSCULAR; INTRAVENOUS at 09:01

## 2025-01-22 RX ADMIN — ALBUTEROL SULFATE 5 MG: 2.5 SOLUTION RESPIRATORY (INHALATION) at 11:01

## 2025-01-22 RX ADMIN — IPRATROPIUM BROMIDE 0.5 MG: 0.5 SOLUTION RESPIRATORY (INHALATION) at 11:01

## 2025-01-22 RX ADMIN — ONDANSETRON 4 MG: 2 INJECTION INTRAMUSCULAR; INTRAVENOUS at 09:01

## 2025-01-22 RX ADMIN — AZITHROMYCIN MONOHYDRATE 500 MG: 500 INJECTION, POWDER, LYOPHILIZED, FOR SOLUTION INTRAVENOUS at 09:01

## 2025-01-22 RX ADMIN — ACETAMINOPHEN 650 MG: 325 TABLET, FILM COATED ORAL at 09:01

## 2025-01-22 NOTE — Clinical Note
AVS virtually reviewed with patient in its entirety with emphasis on diet, medications, follow-up appointments and reasons to return to the ED or contact the [facility] On Call Nurse Care Line. Patient also encouraged to utilize their patient portal. Ease and convenience of use reiterated. Education complete and patient voiced understanding. All questions answered. Discharge teaching complete. 
Diagnosis: Community acquired pneumonia, unspecified laterality [5402322]   Reason for IP Medical Treatment  (Clinical interventions that can only be accomplished in the IP setting? ) :: Hypoxia, CAP, sepsis  
No

## 2025-01-23 PROBLEM — R79.89 ELEVATED TROPONIN: Status: ACTIVE | Noted: 2025-01-23

## 2025-01-23 LAB
ALBUMIN SERPL BCP-MCNC: 3.1 G/DL (ref 3.5–5.2)
ALP SERPL-CCNC: 59 U/L (ref 40–150)
ALT SERPL W/O P-5'-P-CCNC: 22 U/L (ref 10–44)
ANION GAP SERPL CALC-SCNC: 12 MMOL/L (ref 8–16)
AST SERPL-CCNC: 30 U/L (ref 10–40)
BASOPHILS # BLD AUTO: 0.02 K/UL (ref 0–0.2)
BASOPHILS NFR BLD: 0.2 % (ref 0–1.9)
BILIRUB SERPL-MCNC: 0.5 MG/DL (ref 0.1–1)
BUN SERPL-MCNC: 15 MG/DL (ref 8–23)
C DIFF GDH STL QL: NEGATIVE
C DIFF TOX A+B STL QL IA: NEGATIVE
CALCIUM SERPL-MCNC: 9.1 MG/DL (ref 8.7–10.5)
CHLORIDE SERPL-SCNC: 103 MMOL/L (ref 95–110)
CO2 SERPL-SCNC: 20 MMOL/L (ref 23–29)
CREAT SERPL-MCNC: 1.5 MG/DL (ref 0.5–1.4)
DIFFERENTIAL METHOD BLD: ABNORMAL
EOSINOPHIL # BLD AUTO: 0 K/UL (ref 0–0.5)
EOSINOPHIL NFR BLD: 0 % (ref 0–8)
ERYTHROCYTE [DISTWIDTH] IN BLOOD BY AUTOMATED COUNT: 13.8 % (ref 11.5–14.5)
EST. GFR  (NO RACE VARIABLE): 51 ML/MIN/1.73 M^2
GLUCOSE SERPL-MCNC: 192 MG/DL (ref 70–110)
HCT VFR BLD AUTO: 39.4 % (ref 40–54)
HGB BLD-MCNC: 12.8 G/DL (ref 14–18)
IMM GRANULOCYTES # BLD AUTO: 0.05 K/UL (ref 0–0.04)
IMM GRANULOCYTES NFR BLD AUTO: 0.5 % (ref 0–0.5)
LACTATE SERPL-SCNC: 1.3 MMOL/L (ref 0.5–2.2)
LYMPHOCYTES # BLD AUTO: 0.3 K/UL (ref 1–4.8)
LYMPHOCYTES NFR BLD: 2.3 % (ref 18–48)
MAGNESIUM SERPL-MCNC: 1.8 MG/DL (ref 1.6–2.6)
MCH RBC QN AUTO: 28.1 PG (ref 27–31)
MCHC RBC AUTO-ENTMCNC: 32.5 G/DL (ref 32–36)
MCV RBC AUTO: 86 FL (ref 82–98)
MONOCYTES # BLD AUTO: 0.2 K/UL (ref 0.3–1)
MONOCYTES NFR BLD: 2.1 % (ref 4–15)
NEUTROPHILS # BLD AUTO: 10.5 K/UL (ref 1.8–7.7)
NEUTROPHILS NFR BLD: 94.9 % (ref 38–73)
NRBC BLD-RTO: 0 /100 WBC
OHS QRS DURATION: 94 MS
OHS QTC CALCULATION: 459 MS
PHOSPHATE SERPL-MCNC: 3 MG/DL (ref 2.7–4.5)
PLATELET # BLD AUTO: 206 K/UL (ref 150–450)
PMV BLD AUTO: 9.1 FL (ref 9.2–12.9)
POTASSIUM SERPL-SCNC: 3.7 MMOL/L (ref 3.5–5.1)
PROT SERPL-MCNC: 6.7 G/DL (ref 6–8.4)
RBC # BLD AUTO: 4.56 M/UL (ref 4.6–6.2)
SODIUM SERPL-SCNC: 135 MMOL/L (ref 136–145)
TROPONIN I SERPL DL<=0.01 NG/ML-MCNC: 0.06 NG/ML (ref 0–0.03)
TROPONIN I SERPL DL<=0.01 NG/ML-MCNC: 0.06 NG/ML (ref 0–0.03)
WBC # BLD AUTO: 11.05 K/UL (ref 3.9–12.7)

## 2025-01-23 PROCEDURE — 85025 COMPLETE CBC W/AUTO DIFF WBC: CPT | Performed by: NURSE PRACTITIONER

## 2025-01-23 PROCEDURE — 27000207 HC ISOLATION

## 2025-01-23 PROCEDURE — 11000001 HC ACUTE MED/SURG PRIVATE ROOM

## 2025-01-23 PROCEDURE — 84100 ASSAY OF PHOSPHORUS: CPT | Performed by: NURSE PRACTITIONER

## 2025-01-23 PROCEDURE — 94761 N-INVAS EAR/PLS OXIMETRY MLT: CPT

## 2025-01-23 PROCEDURE — 80053 COMPREHEN METABOLIC PANEL: CPT | Performed by: NURSE PRACTITIONER

## 2025-01-23 PROCEDURE — 36415 COLL VENOUS BLD VENIPUNCTURE: CPT | Performed by: NURSE PRACTITIONER

## 2025-01-23 PROCEDURE — 27000221 HC OXYGEN, UP TO 24 HOURS

## 2025-01-23 PROCEDURE — 94640 AIRWAY INHALATION TREATMENT: CPT

## 2025-01-23 PROCEDURE — 25000242 PHARM REV CODE 250 ALT 637 W/ HCPCS: Performed by: NURSE PRACTITIONER

## 2025-01-23 PROCEDURE — 83605 ASSAY OF LACTIC ACID: CPT | Performed by: NURSE PRACTITIONER

## 2025-01-23 PROCEDURE — 25000003 PHARM REV CODE 250: Performed by: NURSE PRACTITIONER

## 2025-01-23 PROCEDURE — 84484 ASSAY OF TROPONIN QUANT: CPT | Mod: 91 | Performed by: NURSE PRACTITIONER

## 2025-01-23 PROCEDURE — 63600175 PHARM REV CODE 636 W HCPCS: Performed by: NURSE PRACTITIONER

## 2025-01-23 PROCEDURE — 87449 NOS EACH ORGANISM AG IA: CPT | Performed by: NURSE PRACTITIONER

## 2025-01-23 PROCEDURE — 99900035 HC TECH TIME PER 15 MIN (STAT)

## 2025-01-23 PROCEDURE — 83735 ASSAY OF MAGNESIUM: CPT | Performed by: NURSE PRACTITIONER

## 2025-01-23 RX ORDER — MORPHINE SULFATE 2 MG/ML
2 INJECTION, SOLUTION INTRAMUSCULAR; INTRAVENOUS EVERY 4 HOURS PRN
Status: DISCONTINUED | OUTPATIENT
Start: 2025-01-23 | End: 2025-01-24 | Stop reason: HOSPADM

## 2025-01-23 RX ORDER — FLUTICASONE PROPIONATE 50 MCG
2 SPRAY, SUSPENSION (ML) NASAL DAILY
COMMUNITY

## 2025-01-23 RX ORDER — CETIRIZINE HYDROCHLORIDE 10 MG/1
10 TABLET ORAL DAILY
COMMUNITY

## 2025-01-23 RX ORDER — IPRATROPIUM BROMIDE AND ALBUTEROL SULFATE 2.5; .5 MG/3ML; MG/3ML
3 SOLUTION RESPIRATORY (INHALATION)
Status: DISCONTINUED | OUTPATIENT
Start: 2025-01-23 | End: 2025-01-24 | Stop reason: HOSPADM

## 2025-01-23 RX ORDER — BUMETANIDE 2 MG/1
2 TABLET ORAL 2 TIMES DAILY
COMMUNITY

## 2025-01-23 RX ORDER — FLUTICASONE FUROATE AND VILANTEROL 200; 25 UG/1; UG/1
1 POWDER RESPIRATORY (INHALATION) DAILY
Status: DISCONTINUED | OUTPATIENT
Start: 2025-01-23 | End: 2025-01-24 | Stop reason: HOSPADM

## 2025-01-23 RX ADMIN — CEFTRIAXONE SODIUM 1 G: 1 INJECTION, POWDER, FOR SOLUTION INTRAMUSCULAR; INTRAVENOUS at 08:01

## 2025-01-23 RX ADMIN — AZITHROMYCIN MONOHYDRATE 500 MG: 500 INJECTION, POWDER, LYOPHILIZED, FOR SOLUTION INTRAVENOUS at 08:01

## 2025-01-23 RX ADMIN — ATORVASTATIN CALCIUM 40 MG: 20 TABLET, FILM COATED ORAL at 09:01

## 2025-01-23 RX ADMIN — OSELTAMIVIR PHOSPHATE 75 MG: 75 CAPSULE ORAL at 09:01

## 2025-01-23 RX ADMIN — IPRATROPIUM BROMIDE AND ALBUTEROL SULFATE 3 ML: 2.5; .5 SOLUTION RESPIRATORY (INHALATION) at 11:01

## 2025-01-23 RX ADMIN — FINASTERIDE 5 MG: 5 TABLET, FILM COATED ORAL at 09:01

## 2025-01-23 RX ADMIN — IPRATROPIUM BROMIDE AND ALBUTEROL SULFATE 3 ML: 2.5; .5 SOLUTION RESPIRATORY (INHALATION) at 07:01

## 2025-01-23 RX ADMIN — BUMETANIDE 2 MG: 1 TABLET ORAL at 06:01

## 2025-01-23 RX ADMIN — IPRATROPIUM BROMIDE AND ALBUTEROL SULFATE 3 ML: 2.5; .5 SOLUTION RESPIRATORY (INHALATION) at 03:01

## 2025-01-23 RX ADMIN — AMLODIPINE BESYLATE 5 MG: 5 TABLET ORAL at 09:01

## 2025-01-23 RX ADMIN — LEVOTHYROXINE SODIUM 50 MCG: 25 TABLET ORAL at 06:01

## 2025-01-23 RX ADMIN — APIXABAN 5 MG: 2.5 TABLET, FILM COATED ORAL at 08:01

## 2025-01-23 RX ADMIN — MORPHINE SULFATE 2 MG: 2 INJECTION, SOLUTION INTRAMUSCULAR; INTRAVENOUS at 03:01

## 2025-01-23 RX ADMIN — SPIRONOLACTONE 50 MG: 25 TABLET ORAL at 09:01

## 2025-01-23 RX ADMIN — BUMETANIDE 2 MG: 1 TABLET ORAL at 09:01

## 2025-01-23 RX ADMIN — METOPROLOL SUCCINATE 25 MG: 25 TABLET, EXTENDED RELEASE ORAL at 09:01

## 2025-01-23 RX ADMIN — FLUTICASONE FUROATE AND VILANTEROL TRIFENATATE 1 PUFF: 200; 25 POWDER RESPIRATORY (INHALATION) at 07:01

## 2025-01-23 RX ADMIN — MORPHINE SULFATE 2 MG: 2 INJECTION, SOLUTION INTRAMUSCULAR; INTRAVENOUS at 01:01

## 2025-01-23 RX ADMIN — APIXABAN 5 MG: 2.5 TABLET, FILM COATED ORAL at 09:01

## 2025-01-23 RX ADMIN — GABAPENTIN 600 MG: 300 CAPSULE ORAL at 08:01

## 2025-01-23 RX ADMIN — TIOTROPIUM BROMIDE INHALATION SPRAY 2 PUFF: 3.12 SPRAY, METERED RESPIRATORY (INHALATION) at 07:01

## 2025-01-23 RX ADMIN — PREDNISONE 50 MG: 50 TABLET ORAL at 09:01

## 2025-01-23 RX ADMIN — MONTELUKAST 10 MG: 10 TABLET, FILM COATED ORAL at 08:01

## 2025-01-23 RX ADMIN — TAMSULOSIN HYDROCHLORIDE 0.8 MG: 0.4 CAPSULE ORAL at 09:01
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
## 2025-01-23 NOTE — PLAN OF CARE
POC reviewed with the patient. AOx4 VSS on 2L NC. All due medications given.Complained of pain, PRN medication given as per MAR. Purposeful rounding done. Safety and droplet precaution maintained.     Problem: Adult Inpatient Plan of Care  Goal: Plan of Care Review  Outcome: Progressing  Goal: Patient-Specific Goal (Individualized)  Outcome: Progressing  Goal: Absence of Hospital-Acquired Illness or Injury  Outcome: Progressing  Goal: Optimal Comfort and Wellbeing  Outcome: Progressing  Goal: Readiness for Transition of Care  Outcome: Progressing     Problem: Pneumonia  Goal: Fluid Balance  Outcome: Progressing  Goal: Resolution of Infection Signs and Symptoms  Outcome: Progressing  Goal: Effective Oxygenation and Ventilation  Outcome: Progressing     Problem: Wound  Goal: Optimal Coping  Outcome: Progressing  Goal: Optimal Functional Ability  Outcome: Progressing  Goal: Absence of Infection Signs and Symptoms  Outcome: Progressing  Goal: Improved Oral Intake  Outcome: Progressing  Goal: Optimal Pain Control and Function  Outcome: Progressing  Goal: Skin Health and Integrity  Outcome: Progressing  Goal: Optimal Wound Healing  Outcome: Progressing
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
## 2025-01-23 NOTE — PLAN OF CARE
Pt remained free of falls and injuries throughout shift. AAOx4. Pt calm, pleasant, and cooperative. Purposeful hourly rounding performed. Pt swallows meds whole. IV flushed and saline locked. Managed c/o R hip and back pain with PRN IV morphine. Patient ambulates with standby assist using cane, just needs assist get his legs back in bed. Pt educated on possible CDiff infection and need for specimen collection to r/o CDiff. Collection hat in toilet and pt instructed to call for assist with ambulation. Urinal at bedside in reach. VSS on 2 L O2 NC. Pt resting comfortably in bed, denies needs at this time, NADN. Bed low and locked, bed alarm on, call light in reach. Side rails up x2. Will continue to monitor.

## 2025-01-23 NOTE — ASSESSMENT & PLAN NOTE
Patient's COPD is with exacerbation noted by continued dyspnea currently.  Patient is currently off COPD Pathway. Continue scheduled inhalers Steroids, Antibiotics, and Supplemental oxygen and monitor respiratory status closely.     Patient states he is supposed to be on home oxygen at 2 liters, however rats ate the cord 5 months ago, so he hasn't been on it.

## 2025-01-23 NOTE — NURSING
Pt arrived to 321 via wheelchair, pt needed minimal assist transferring to bed. Pt AAOX4 on 2 L O2 NC. Respirations even and non-labored, lungs clear, lower lobes coarse and diminished. Pt has cane at bedside, only needs assist with getting RLE back in bed due to pain. JAIRON Ritchie contacted for c/o 8/10 R hip pain, PRN IV morphine given per new orders. 4 eyes on skin completed with other RN. Pt only has wound to L inner buttock. Wound cleansed and non-adhesive dsg applied. Pt has 2 cellphones and  at bedside. Declines storing belongings with security, will keep at bedside with self. Pt oriented to care setting and plan of care. Bed low and locked, bed alarm on, call light in reach, side rails up X2. Will continue to monitor.

## 2025-01-23 NOTE — PLAN OF CARE
Recommendations  1) Continue cardiac diet    2) Encourage intake of meals    3) Monitor weight/labs   4) RD to monitor and follow up    Goals: Pt will maintain adequate intake >75% of meals by RD follow up  Nutrition Goal Status: new  Communication of RD Recs:  (POC)

## 2025-01-23 NOTE — ASSESSMENT & PLAN NOTE
Creatine stable for now. BMP reviewed- noted Estimated Creatinine Clearance: 58.4 mL/min (A) (based on SCr of 1.6 mg/dL (H)). according to latest data. Based on current GFR, CKD stage is stage 3 - GFR 30-59.  Monitor UOP and serial BMP and adjust therapy as needed. Renally dose meds. Avoid nephrotoxic medications and procedures.

## 2025-01-23 NOTE — ASSESSMENT & PLAN NOTE
Patient's blood pressure range in the last 24 hours was: BP  Min: 119/77  Max: 172/103.The patient's inpatient anti-hypertensive regimen is listed below:  Current Antihypertensives  amLODIPine tablet 5 mg, Daily, Oral  bumetanide tablet 2 mg, 2 times daily, Oral  metoprolol succinate (TOPROL-XL) 24 hr tablet 25 mg, Daily, Oral  spironolactone tablet 50 mg, Daily, Oral    Plan  - BP is controlled, no changes needed to their regimen
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
## 2025-01-23 NOTE — ED NOTES
Attempted to call 3 center to notify that patient was on his way up to the floor. No answer. Received a busy line
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
## 2025-01-23 NOTE — HPI
The patient is a 64-year-old male with a past medical history of COPD, CKD, HTN, HLD, CVA, CHF, and PE currently on Eliquis who presents with rhinorrhea, cough, and dyspnea starting 2 days ago. Patient reports having to use his inhaler multiple times with his dyspnea improved at this time. He reports multiple loose stools that worsened throughout the day today. On initial workup, patient febrile, hypoxia to 88, tachycardia, and tachypnea.  He is influenza A positive and appears to have a right lung pneumonia developing.  He will be admitted for further management of his COPD exacerbation, developing pneumonia, and Influenza A

## 2025-01-23 NOTE — ED TRIAGE NOTES
Pt presents to ED via EMS w/ c/o SOB, chest pain, fevers, chills, N/V/D, headache. Pt reports using inhaler at home w/ no relief. AAOx4. NAD noted

## 2025-01-23 NOTE — ASSESSMENT & PLAN NOTE
Patient has a diagnosis of pneumonia. The cause of the pneumonia is suspected to be bacterial in etiology but organism is not known. The pneumonia is  unchanged . The patient has the following signs/symptoms of pneumonia: cough, sputum production, shortness of breath, and chest pain. The patient does have a current oxygen requirement and the patient does have a home oxygen requirement. I have reviewed the pertinent imaging.      Current antimicrobial regimen consists of the antibiotics listed below and Oseltamivir. Will monitor patient closely and continue current treatment plan unchanged.    Antibiotics (From admission, onward)      Start     Stop Route Frequency Ordered    01/23/25 2100  cefTRIAXone injection 1 g         -- IV Every 24 hours (non-standard times) 01/22/25 2317 01/23/25 2100  azithromycin (ZITHROMAX) 500 mg in 0.9% NaCl 250 mL IVPB (admixture device)         -- IV Every 24 hours (non-standard times) 01/22/25 2317            Microbiology Results (last 7 days)       Procedure Component Value Units Date/Time    Clostridium difficile EIA [[id number]]     Order Status: No result Specimen: Stool     Blood culture x two cultures. Draw prior to antibiotics. [[id number]] Collected: 01/22/25 2120    Order Status: Sent Specimen: Blood     Blood culture x two cultures. Draw prior to antibiotics. [[id number]] Collected: 01/22/25 2112    Order Status: Sent Specimen: Blood from Peripheral, Antecubital, Left

## 2025-01-23 NOTE — PLAN OF CARE
Assessment completed via telephone with patient. Patient will need ride home.     [facility] ([place])  Initial Discharge Assessment       Primary Care Provider: Carol Dumont MD    Admission Diagnosis: Hypoxia [R09.02]  Influenza [J11.1]  Wound of left buttock, initial encounter [S31.829A]  Dyspnea, unspecified type [R06.00]  Community acquired pneumonia, unspecified laterality [J18.9]    Admission Date: 1/22/2025  Expected Discharge Date:     Transition of Care Barriers: None    Payor: [employer] MEDICARE / Plan: [employer] HMO PPO SPECIAL NEEDS / Product Type: Medicare Advantage /     Extended Emergency Contact Information  Primary Emergency Contact: Christine Martinez   [place]  Mobile Phone: [phone]  Relation: Sister    Discharge Plan A: Home  Discharge Plan B: Home      [facility]  [address]  [place] [address]  Phone: [phone] Fax: [phone]    [id number] - [place], [place] - [address] AT [facility] [address]  [address]  [place] [address]  Phone: [phone] Fax: [phone]    [id number] - [place], [place] - [address] AT [facility] [address]  [address]  [place] [address]  Phone: [phone] Fax: [phone]      Initial Assessment (most recent)       Adult Discharge Assessment - 01/23/25 1121          Discharge Assessment    Assessment Type Discharge Planning Assessment     Confirmed/corrected address, phone number and insurance Yes     Confirmed Demographics Correct on Facesheet     Source of Information patient     Communicated JUJU with patient/caregiver Date not available/Unable to determine     People in Home alone     Do you expect to return to your current living situation? Yes     Do you have help at home or someone to help you manage your care at home? No     Prior to hospitilization cognitive status: Alert/Oriented     Current  cognitive status: Alert/Oriented     Walking or Climbing Stairs Difficulty yes     Walking or Climbing Stairs ambulation difficulty, requires equipment     Mobility Management cane     Equipment Currently Used at Home cane, straight;oxygen;nebulizer     Readmission within 30 days? No     Patient currently being followed by outpatient case management? No     Do you currently have service(s) that help you manage your care at home? No     Do you take prescription medications? Yes     Do you have prescription coverage? Yes     Coverage [employer] MM, Medicaid     Do you have any problems affording any of your prescribed medications? No     Is the patient taking medications as prescribed? yes     Who is going to help you get home at discharge? will need transportation home     How do you get to doctors appointments? health plan transportation     Are you on dialysis? No     Do you take coumadin? No     Discharge Plan A Home     Discharge Plan B Home     DME Needed Upon Discharge  none     Discharge Plan discussed with: Patient     Transition of Care Barriers None

## 2025-01-23 NOTE — ED NOTES
Pt had BM in the bed. Pt cleaned, new sheets placed on bed, new gown given to pt, brief placed on pt.

## 2025-01-24 VITALS
HEIGHT: 71 IN | TEMPERATURE: 98 F | DIASTOLIC BLOOD PRESSURE: 77 MMHG | BODY MASS INDEX: 35.31 KG/M2 | HEART RATE: 66 BPM | SYSTOLIC BLOOD PRESSURE: 123 MMHG | RESPIRATION RATE: 16 BRPM | WEIGHT: 252.19 LBS | OXYGEN SATURATION: 96 %

## 2025-01-24 PROBLEM — Z79.01 CHRONIC ANTICOAGULATION: Status: ACTIVE | Noted: 2025-01-24

## 2025-01-24 PROBLEM — J43.2 CENTRILOBULAR EMPHYSEMA: Status: ACTIVE | Noted: 2025-01-22

## 2025-01-24 PROBLEM — J41.0 SIMPLE CHRONIC BRONCHITIS: Status: ACTIVE | Noted: 2025-01-22

## 2025-01-24 LAB
ALBUMIN SERPL BCP-MCNC: 2.9 G/DL (ref 3.5–5.2)
ALP SERPL-CCNC: 55 U/L (ref 40–150)
ALT SERPL W/O P-5'-P-CCNC: 19 U/L (ref 10–44)
ANION GAP SERPL CALC-SCNC: 10 MMOL/L (ref 8–16)
AST SERPL-CCNC: 22 U/L (ref 10–40)
BASOPHILS # BLD AUTO: 0.02 K/UL (ref 0–0.2)
BASOPHILS NFR BLD: 0.2 % (ref 0–1.9)
BILIRUB SERPL-MCNC: 0.3 MG/DL (ref 0.1–1)
BUN SERPL-MCNC: 15 MG/DL (ref 8–23)
CALCIUM SERPL-MCNC: 9.1 MG/DL (ref 8.7–10.5)
CHLORIDE SERPL-SCNC: 104 MMOL/L (ref 95–110)
CO2 SERPL-SCNC: 25 MMOL/L (ref 23–29)
CREAT SERPL-MCNC: 1.3 MG/DL (ref 0.5–1.4)
DIFFERENTIAL METHOD BLD: ABNORMAL
EOSINOPHIL # BLD AUTO: 0 K/UL (ref 0–0.5)
EOSINOPHIL NFR BLD: 0 % (ref 0–8)
ERYTHROCYTE [DISTWIDTH] IN BLOOD BY AUTOMATED COUNT: 13.7 % (ref 11.5–14.5)
EST. GFR  (NO RACE VARIABLE): >60 ML/MIN/1.73 M^2
GLUCOSE SERPL-MCNC: 120 MG/DL (ref 70–110)
HCT VFR BLD AUTO: 39.1 % (ref 40–54)
HGB BLD-MCNC: 12.1 G/DL (ref 14–18)
IMM GRANULOCYTES # BLD AUTO: 0.04 K/UL (ref 0–0.04)
IMM GRANULOCYTES NFR BLD AUTO: 0.3 % (ref 0–0.5)
LYMPHOCYTES # BLD AUTO: 0.9 K/UL (ref 1–4.8)
LYMPHOCYTES NFR BLD: 6.5 % (ref 18–48)
MAGNESIUM SERPL-MCNC: 2.2 MG/DL (ref 1.6–2.6)
MCH RBC QN AUTO: 27.1 PG (ref 27–31)
MCHC RBC AUTO-ENTMCNC: 30.9 G/DL (ref 32–36)
MCV RBC AUTO: 88 FL (ref 82–98)
MONOCYTES # BLD AUTO: 0.9 K/UL (ref 0.3–1)
MONOCYTES NFR BLD: 7.1 % (ref 4–15)
NEUTROPHILS # BLD AUTO: 11.3 K/UL (ref 1.8–7.7)
NEUTROPHILS NFR BLD: 85.9 % (ref 38–73)
NRBC BLD-RTO: 0 /100 WBC
PHOSPHATE SERPL-MCNC: 3.3 MG/DL (ref 2.7–4.5)
PLATELET # BLD AUTO: 211 K/UL (ref 150–450)
PMV BLD AUTO: 9.4 FL (ref 9.2–12.9)
POTASSIUM SERPL-SCNC: 3.8 MMOL/L (ref 3.5–5.1)
PROT SERPL-MCNC: 6.5 G/DL (ref 6–8.4)
RBC # BLD AUTO: 4.46 M/UL (ref 4.6–6.2)
SODIUM SERPL-SCNC: 139 MMOL/L (ref 136–145)
WBC # BLD AUTO: 13.16 K/UL (ref 3.9–12.7)

## 2025-01-24 PROCEDURE — 63600175 PHARM REV CODE 636 W HCPCS: Performed by: NURSE PRACTITIONER

## 2025-01-24 PROCEDURE — 94640 AIRWAY INHALATION TREATMENT: CPT

## 2025-01-24 PROCEDURE — 83735 ASSAY OF MAGNESIUM: CPT | Performed by: NURSE PRACTITIONER

## 2025-01-24 PROCEDURE — 85025 COMPLETE CBC W/AUTO DIFF WBC: CPT | Performed by: NURSE PRACTITIONER

## 2025-01-24 PROCEDURE — 25000003 PHARM REV CODE 250: Performed by: NURSE PRACTITIONER

## 2025-01-24 PROCEDURE — 94761 N-INVAS EAR/PLS OXIMETRY MLT: CPT

## 2025-01-24 PROCEDURE — 36415 COLL VENOUS BLD VENIPUNCTURE: CPT | Performed by: NURSE PRACTITIONER

## 2025-01-24 PROCEDURE — 27000221 HC OXYGEN, UP TO 24 HOURS

## 2025-01-24 PROCEDURE — 80053 COMPREHEN METABOLIC PANEL: CPT | Performed by: NURSE PRACTITIONER

## 2025-01-24 PROCEDURE — 25000242 PHARM REV CODE 250 ALT 637 W/ HCPCS: Performed by: NURSE PRACTITIONER

## 2025-01-24 PROCEDURE — 99900035 HC TECH TIME PER 15 MIN (STAT)

## 2025-01-24 PROCEDURE — 84100 ASSAY OF PHOSPHORUS: CPT | Performed by: NURSE PRACTITIONER

## 2025-01-24 RX ORDER — DOXYCYCLINE 100 MG/1
100 CAPSULE ORAL EVERY 12 HOURS
Qty: 14 CAPSULE | Refills: 0 | Status: SHIPPED | OUTPATIENT
Start: 2025-01-24 | End: 2025-01-26

## 2025-01-24 RX ORDER — OSELTAMIVIR PHOSPHATE 75 MG/1
75 CAPSULE ORAL 2 TIMES DAILY
Qty: 7 CAPSULE | Refills: 0 | Status: ON HOLD | OUTPATIENT
Start: 2025-01-24 | End: 2025-01-27

## 2025-01-24 RX ADMIN — ATORVASTATIN CALCIUM 40 MG: 20 TABLET, FILM COATED ORAL at 09:01

## 2025-01-24 RX ADMIN — METOPROLOL SUCCINATE 25 MG: 25 TABLET, EXTENDED RELEASE ORAL at 09:01

## 2025-01-24 RX ADMIN — TAMSULOSIN HYDROCHLORIDE 0.8 MG: 0.4 CAPSULE ORAL at 09:01

## 2025-01-24 RX ADMIN — SPIRONOLACTONE 50 MG: 25 TABLET ORAL at 09:01

## 2025-01-24 RX ADMIN — IPRATROPIUM BROMIDE AND ALBUTEROL SULFATE 3 ML: 2.5; .5 SOLUTION RESPIRATORY (INHALATION) at 12:01

## 2025-01-24 RX ADMIN — BUMETANIDE 2 MG: 1 TABLET ORAL at 09:01

## 2025-01-24 RX ADMIN — FINASTERIDE 5 MG: 5 TABLET, FILM COATED ORAL at 09:01

## 2025-01-24 RX ADMIN — LEVOTHYROXINE SODIUM 50 MCG: 25 TABLET ORAL at 05:01

## 2025-01-24 RX ADMIN — TIOTROPIUM BROMIDE INHALATION SPRAY 2 PUFF: 3.12 SPRAY, METERED RESPIRATORY (INHALATION) at 08:01

## 2025-01-24 RX ADMIN — AMLODIPINE BESYLATE 5 MG: 5 TABLET ORAL at 09:01

## 2025-01-24 RX ADMIN — FLUTICASONE FUROATE AND VILANTEROL TRIFENATATE 1 PUFF: 200; 25 POWDER RESPIRATORY (INHALATION) at 08:01

## 2025-01-24 RX ADMIN — APIXABAN 5 MG: 2.5 TABLET, FILM COATED ORAL at 09:01

## 2025-01-24 RX ADMIN — GABAPENTIN 600 MG: 300 CAPSULE ORAL at 09:01

## 2025-01-24 RX ADMIN — IPRATROPIUM BROMIDE AND ALBUTEROL SULFATE 3 ML: 2.5; .5 SOLUTION RESPIRATORY (INHALATION) at 08:01

## 2025-01-24 RX ADMIN — OSELTAMIVIR PHOSPHATE 75 MG: 75 CAPSULE ORAL at 09:01

## 2025-01-24 RX ADMIN — MORPHINE SULFATE 2 MG: 2 INJECTION, SOLUTION INTRAMUSCULAR; INTRAVENOUS at 12:01

## 2025-01-24 NOTE — ASSESSMENT & PLAN NOTE
Patient has a diagnosis of pneumonia. The cause of the pneumonia is suspected to be bacterial in etiology but organism is not known. The pneumonia is  unchanged . The patient has the following signs/symptoms of pneumonia: cough, sputum production, shortness of breath, and chest pain. The patient does have a current oxygen requirement and the patient does have a home oxygen requirement. I have reviewed the pertinent imaging.      Current antimicrobial regimen consists of the antibiotics listed below and Oseltamivir. Will monitor patient closely and continue current treatment plan unchanged.    Antibiotics (From admission, onward)    Start     Stop Route Frequency Ordered    01/23/25 2100  cefTRIAXone injection 1 g         -- IV Every 24 hours (non-standard times) 01/22/25 2317 01/23/25 2100  azithromycin (ZITHROMAX) 500 mg in 0.9% NaCl 250 mL IVPB (admixture device)         -- IV Every 24 hours (non-standard times) 01/22/25 2317          Microbiology Results (last 7 days)     Procedure Component Value Units Date/Time    Clostridium difficile EIA [[id number]] Collected: 01/23/25 1259    Order Status: Completed Specimen: Stool Updated: 01/23/25 2202     C. diff Antigen Negative     C difficile Toxins A+B, EIA Negative     Comment: Testing not recommended for children <24 months old.       Narrative:      If specimen collected today, this will be a community onset  CDIFF case. If specimen cannot be collected by hospital day  3, discontinue test and follow Diarrhea Decision Tree to  determine if a MAGGIE CDIFF order is appropriate. The order will  automatically discontinue if specimen not collected within  48 hours.  [email or web address]  [email or web address]    Blood culture x two cultures. Draw prior to antibiotics. [[id number]] Collected: 01/22/25 2120    Order Status: Completed Specimen: Blood Updated: 01/23/25 1915     Blood Culture, Routine No Growth to date    Narrative:       Aerobic and anaerobic    Blood culture x two cultures. Draw prior to antibiotics. [[id number]] Collected: 01/22/25 2112    Order Status: Completed Specimen: Blood from Peripheral, Antecubital, Left Updated: 01/23/25 1915     Blood Culture, Routine No Growth to date    Narrative:      Aerobic and anaerobic
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
## 2025-01-24 NOTE — PLAN OF CARE
Medicare Message     Important Message from Medicare regarding Discharge Appeal Rights Explained to patient/caregiver; Signed/date by patient/caregiver   Date IMM was signed 1/23/2025   Time IMM was signed 0942

## 2025-01-24 NOTE — PLAN OF CARE
AAO x 4.  Patient is ambulatory with cane and standby assist (though he doesn't call when out of bed).  Plan of care reviewed.  Skin is warm and dry. Small pressure appearing wound site to left inner buttocks said to be open to air ( patient declines to show me). Decreased lung sounds to the bases.  No audible cough noted ( scheduled cough medication noted).  Oxygen at 2 liters/nasal canula.  No complaints of shortness of breath. Droplet precautions isolation in use.  Patient did complain that he had diarrhea during the day shift but he hasn't been up to toilet since shift change so far to my knowledge. No requests for pain medication or nebulizer treatments during the night. No Temps recorded.  IV patent to the right wrist area.  IV antibiotics x 2 given over night shift.  Call light within reach.   Rounding maintained per protocol.    Problem: Adult Inpatient Plan of Care  Goal: Plan of Care Review  Outcome: Progressing  Flowsheets (Taken 1/24/2025 0426)  Plan of Care Reviewed With: patient  Goal: Patient-Specific Goal (Individualized)  Outcome: Progressing  Goal: Absence of Hospital-Acquired Illness or Injury  Outcome: Progressing  Intervention: Identify and Manage Fall Risk  Flowsheets (Taken 1/24/2025 0426)  Safety Promotion/Fall Prevention:   assistive device/personal item within reach   bed alarm refused   commode/urinal/bedpan at bedside   Fall Risk reviewed with patient/family   high risk medications identified   instructed to call staff for mobility   lighting adjusted   medications reviewed   nonskid shoes/socks when out of bed   patient expresses understanding of fall risk and prevention   side rails raised x 2  Intervention: Prevent Skin Injury  Flowsheets (Taken 1/24/2025 0426)  Body Position: position changed independently  Skin Protection: incontinence pads utilized  Device Skin Pressure Protection: absorbent pad utilized/changed  Intervention: Prevent and Manage VTE (Venous Thromboembolism)  Risk  Flowsheets (Taken 1/24/2025 0426)  VTE Prevention/Management:   ambulation promoted   bleeding risk assessed   ROM (active) performed  Intervention: Prevent Infection  Flowsheets (Taken 1/24/2025 0426)  Infection Prevention: single patient room provided  Goal: Optimal Comfort and Wellbeing  Outcome: Progressing  Intervention: Monitor Pain and Promote Comfort  Flowsheets (Taken 1/24/2025 0426)  Pain Management Interventions: pain management plan reviewed with patient/caregiver  Intervention: Provide Person-Centered Care  Flowsheets (Taken 1/24/2025 0426)  Trust Relationship/Rapport:   care explained   choices provided   emotional support provided   empathic listening provided   questions answered   questions encouraged   reassurance provided   thoughts/feelings acknowledged  Goal: Readiness for Transition of Care  Outcome: Progressing     Problem: Pneumonia  Goal: Fluid Balance  Outcome: Progressing  Goal: Resolution of Infection Signs and Symptoms  Outcome: Progressing  Intervention: Prevent Infection Progression  Flowsheets (Taken 1/24/2025 0426)  Fever Reduction/Comfort Measures: lightweight bedding  Infection Management: aseptic technique maintained  Isolation Precautions: droplet  Goal: Effective Oxygenation and Ventilation  Outcome: Progressing  Intervention: Promote Airway Secretion Clearance  Flowsheets (Taken 1/24/2025 0426)  Breathing Techniques/Airway Clearance: deep/controlled cough encouraged  Cough And Deep Breathing: done independently per patient  Intervention: Optimize Oxygenation and Ventilation  Flowsheets (Taken 1/24/2025 0426)  Head of Bed (HOB) Positioning: HOB elevated     Problem: Skin Injury Risk Increased  Goal: Skin Health and Integrity  Outcome: Progressing  Intervention: Optimize Skin Protection  Flowsheets (Taken 1/24/2025 0426)  Pressure Reduction Techniques: frequent weight shift encouraged  Pressure Reduction Devices: pressure-redistributing mattress utilized  Skin Protection:  incontinence pads utilized  Activity Management: Ambulated to bathroom - L4  Head of Bed (HOB) Positioning: HOB elevated     Problem: Fall Injury Risk  Goal: Absence of Fall and Fall-Related Injury  Outcome: Progressing  Intervention: Identify and Manage Contributors  Flowsheets (Taken 1/24/2025 0426)  Self-Care Promotion:   independence encouraged   BADL personal objects within reach  Medication Review/Management:   medications reviewed   high-risk medications identified  Intervention: Promote Injury-Free Environment  Flowsheets (Taken 1/24/2025 0426)  Safety Promotion/Fall Prevention:   assistive device/personal item within reach   bed alarm refused   commode/urinal/bedpan at bedside   Fall Risk reviewed with patient/family   high risk medications identified   instructed to call staff for mobility   lighting adjusted   medications reviewed   nonskid shoes/socks when out of bed   patient expresses understanding of fall risk and prevention   side rails raised x 2     Problem: Infection  Goal: Absence of Infection Signs and Symptoms  Outcome: Progressing  Intervention: Prevent or Manage Infection  Flowsheets (Taken 1/24/2025 0426)  Fever Reduction/Comfort Measures: lightweight bedding  Infection Management: aseptic technique maintained  Isolation Precautions: droplet

## 2025-01-24 NOTE — CONSULTS
[facility] ([place])  Wound Care    Patient Name:  Robinson Reardon Sr.   MRN:  [id number]  Date: 2025  Diagnosis: Influenza A    History:     Past Medical History:   Diagnosis Date    Asthma     CHF (congestive heart failure)     COPD (chronic obstructive pulmonary disease)     History of CVA with residual deficit 2022    HLD (hyperlipidemia) 2022    Hyperlipidemia     Hypertension     Hyperthyroidism     Osteoarthritis of hip 2022    Sleep apnea 2017    Stroke     Unintentional weight loss 2022       Social History     Socioeconomic History    Marital status: Single   Tobacco Use    Smoking status: Former     Current packs/day: 0.00     Average packs/day: 0.3 packs/day for 24.0 years (6.0 ttl pk-yrs)     Types: Cigarettes     Start date: 3/7/1994     Quit date: 3/7/2018     Years since quittin.8    Smokeless tobacco: Never   Substance and Sexual Activity    Alcohol use: Not Currently    Drug use: Yes     Types: Marijuana     Comment: Hx of Crack cocaine 15 yrs    Sexual activity: Yes     Social Drivers of Health     Financial Resource Strain: Medium Risk (2024)    Overall Financial Resource Strain (CARDIA)     Difficulty of Paying Living Expenses: Somewhat hard   Food Insecurity: Food Insecurity Present (2024)    Hunger Vital Sign     Worried About Running Out of Food in the Last Year: Sometimes true     Ran Out of Food in the Last Year: Sometimes true   Transportation Needs: Unmet Transportation Needs (2024)    TRANSPORTATION NEEDS     Transportation : Yes, it has kept me from medical appointments or from getting my medications.   Physical Activity: Sufficiently Active (2024)    Exercise Vital Sign     Days of Exercise per Week: 5 days     Minutes of Exercise per Session: 40 min   Stress: Stress Concern Present (2024)    [employer] - Occupational Stress Questionnaire     Feeling of Stress : To some extent   Housing Stability: High  Risk (8/8/2024)    Housing Stability Vital Sign     Unable to Pay for Housing in the Last Year: Yes     Homeless in the Last Year: No       Precautions:     Allergies as of 01/22/2025 - Reviewed 01/22/2025   Allergen Reaction Noted    Ace inhibitors Other (See Comments) and Swelling 01/26/2017    Lisinopril Swelling 03/06/2018    Betadine [povidone-iodine] Rash 10/18/2021       [facility] Assessment Details/Treatment     Wound care consult received for assessment of left buttock. Patient is a 64 year old male with a past medical history of COPD, CKD, HTN, HLD, CVA, CHF, and PE currently on Eliquis who presents with rhinorrhea, cough, and dyspnea starting 2 days ago. Patient reports having to use his inhaler multiple times with his dyspnea improved at this time. He reports multiple loose stools that worsened throughout the day today. On initial workup, patient febrile, hypoxia to 88, tachycardia, and tachypnea. He is influenza A positive and appears to have a right lung pneumonia developing. He will be admitted for further management of his COPD exacerbation, developing pneumonia, and Influenza A.     Upon assessment to left lower buttock noted healing partial thickness ulceration- possible blister that was de-roofed. Wound was cleansed with Vashe and triad paste applied to wound bed only and covered with foam dressing.     Recommend every 3 day dressing changes. Nursing and MD team notified. Orders placed. Wound care will follow.        01/24/25 1107        Wound 01/22/25 2211 Ulceration Left Buttocks #1   Date First Assessed/Time First Assessed: 01/22/25 2211   Present on Original Admission: Yes  Primary Wound Type: Ulceration  Side: Left  Location: Buttocks  Wound Number: #1   Wound Image   (left buttock)   Dressing Appearance Open to air   Drainage Amount None   Drainage Characteristics/Odor No odor   Appearance Pink;Moist   Tissue loss description Partial thickness   Periwound Area Intact;Dry   Wound Edges Open   Wound  Length (cm) 1.2 cm   Wound Width (cm) 1.2 cm   Wound Depth (cm) 0.1 cm   Wound Volume (cm^3) 0.144 cm^3   Wound Surface Area (cm^2) 1.44 cm^2   Care Cleansed with:;Antimicrobial agent;Applied:;Skin Barrier   Dressing Applied;Silicone;Foam   Dressing Change Due 01/27/25 01/24/2025

## 2025-01-24 NOTE — PLAN OF CARE
Problem: Adult Inpatient Plan of Care  Goal: Plan of Care Review  Outcome: Progressing  Goal: Patient-Specific Goal (Individualized)  Outcome: Progressing  Goal: Absence of Hospital-Acquired Illness or Injury  Outcome: Progressing  Goal: Optimal Comfort and Wellbeing  Outcome: Progressing  Goal: Readiness for Transition of Care  Outcome: Progressing     Problem: Pneumonia  Goal: Fluid Balance  Outcome: Progressing  Goal: Resolution of Infection Signs and Symptoms  Outcome: Progressing  Goal: Effective Oxygenation and Ventilation  Outcome: Progressing     Problem: Wound  Goal: Optimal Coping  Outcome: Progressing  Goal: Optimal Functional Ability  Outcome: Progressing  Goal: Absence of Infection Signs and Symptoms  Outcome: Progressing  Goal: Improved Oral Intake  Outcome: Progressing  Goal: Optimal Pain Control and Function  Outcome: Progressing  Goal: Skin Health and Integrity  Outcome: Progressing  Goal: Optimal Wound Healing  Outcome: Progressing     Problem: Skin Injury Risk Increased  Goal: Skin Health and Integrity  Outcome: Progressing     Problem: Fall Injury Risk  Goal: Absence of Fall and Fall-Related Injury  Outcome: Progressing     Problem: Infection  Goal: Absence of Infection Signs and Symptoms  Outcome: Progressing

## 2025-01-24 NOTE — ASSESSMENT & PLAN NOTE
Creatine at baseline.  BMP reviewed- noted Estimated Creatinine Clearance: 62.3 mL/min (A) (based on SCr of 1.5 mg/dL (H)). Based on current GFR, CKD stage is stage 3 - GFR 30-59.  Monitor UOP and serial BMP and adjust therapy as needed. Renally dose meds. Avoid nephrotoxic medications and procedures.

## 2025-01-24 NOTE — DISCHARGE INSTRUCTIONS
Our goal at [facility] is to always give you outstanding care and exceptional service. You may receive a survey by mail, text or e-mail in the next 7-10 days from Beronica Singer and our leadership team asking about the care you received with us. The survey should only take 5-10 minutes to complete and is very important to us.     Your feedback provides us with a way to recognize our staff who work tirelessly to provide the best care! Also, your responses help us learn how to improve when your experience was below our aspiration of excellence. We WILL use your feedback to continue making improvements to help us provide the highest quality care. We keep your personal information and feedback confidential. We appreciate your time completing this survey and can't wait to hear from you!!!     We want you to leave us today feeling like you can DEFINITELY recommend us to others! We look forward to your continued care with us! Thanks so much for choosing [facility] for your healthcare needs!

## 2025-01-24 NOTE — ASSESSMENT & PLAN NOTE
Patient with multiple PE diagnosed 8/14/24.  Repeat CTA 1/13/25 showed resolution  Continue apixaban
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
## 2025-01-24 NOTE — ASSESSMENT & PLAN NOTE
Patient's blood pressure range in the last 24 hours was: BP  Min: 100/58  Max: 138/87.The patient's inpatient anti-hypertensive regimen is listed below:  Current Antihypertensives  amLODIPine tablet 5 mg, Daily, Oral  bumetanide tablet 2 mg, 2 times daily, Oral  metoprolol succinate (TOPROL-XL) 24 hr tablet 25 mg, Daily, Oral  spironolactone tablet 50 mg, Daily, Oral  , 2 times daily, Oral    Plan  - BP is controlled, no changes needed to their regimen

## 2025-01-24 NOTE — PLAN OF CARE
Case Management Final Discharge Note      Discharge Disposition: home    New DME ordered / company name: none    Relevant SDOH / Transition of Care Barriers:  none    Person available to provide assistance at home when needed and their contact information: self    Scheduled followup appointment: Patient will schedule PCP appt.     Referrals placed: none    Transportation: patient will need transportation home.         Patient and family educated on discharge services and updated on DC plan. Bedside RN notified, patient clear to discharge from Case Management Perspective.       [facility] ([place]  Discharge Final Note    Primary Care Provider: Carol Dumont MD    Expected Discharge Date: 1/24/2025    Final Discharge Note (most recent)       Final Note - 01/24/25 1433          Final Note    Assessment Type Final Discharge Note     Anticipated Discharge Disposition Home or Self Care     Hospital Resources/Appts/Education Provided Provided patient/caregiver with written discharge plan information        Post-Acute Status    Discharge Delays None known at this time                     Important Message from Medicare             Contact Info       Carol Dumont MD   Specialty: Family Medicine   Relationship: PCP - General    [address] [facility] [address]   Phone: [phone]       Next Steps: Follow up    Instructions: Follow up in 1-2 weeks

## 2025-01-24 NOTE — ASSESSMENT & PLAN NOTE
Known history of multiple liver lesions worked up with MRI and serial CT   Felt to be liver cysts and hamartomas  No current issues.
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
## 2025-01-25 ENCOUNTER — HOSPITAL ENCOUNTER (OUTPATIENT)
Facility: HOSPITAL | Age: 65
Discharge: HOME HEALTH | End: 2025-01-27
Attending: STUDENT IN AN ORGANIZED HEALTH CARE EDUCATION/TRAINING PROGRAM | Admitting: FAMILY MEDICINE
Payer: MEDICARE

## 2025-01-25 ENCOUNTER — NURSE TRIAGE (OUTPATIENT)
Dept: ADMINISTRATIVE | Facility: CLINIC | Age: 65
End: 2025-01-25
Payer: MEDICARE

## 2025-01-25 DIAGNOSIS — J44.9 CHRONIC OBSTRUCTIVE PULMONARY DISEASE, UNSPECIFIED COPD TYPE: Primary | ICD-10-CM

## 2025-01-25 DIAGNOSIS — R07.9 CHEST PAIN: ICD-10-CM

## 2025-01-25 DIAGNOSIS — R07.89 COSTOCHONDRAL CHEST PAIN: ICD-10-CM

## 2025-01-25 LAB
ALBUMIN SERPL BCP-MCNC: 3.3 G/DL (ref 3.5–5.2)
ALP SERPL-CCNC: 66 U/L (ref 40–150)
ALT SERPL W/O P-5'-P-CCNC: 16 U/L (ref 10–44)
ANION GAP SERPL CALC-SCNC: 12 MMOL/L (ref 8–16)
AST SERPL-CCNC: 27 U/L (ref 10–40)
BASOPHILS # BLD AUTO: 0.04 K/UL (ref 0–0.2)
BASOPHILS NFR BLD: 0.4 % (ref 0–1.9)
BILIRUB SERPL-MCNC: 0.5 MG/DL (ref 0.1–1)
BNP SERPL-MCNC: <10 PG/ML (ref 0–99)
BUN SERPL-MCNC: 16 MG/DL (ref 8–23)
CALCIUM SERPL-MCNC: 9.1 MG/DL (ref 8.7–10.5)
CHLORIDE SERPL-SCNC: 100 MMOL/L (ref 95–110)
CO2 SERPL-SCNC: 27 MMOL/L (ref 23–29)
CREAT SERPL-MCNC: 1.3 MG/DL (ref 0.5–1.4)
DIFFERENTIAL METHOD BLD: ABNORMAL
EOSINOPHIL # BLD AUTO: 0 K/UL (ref 0–0.5)
EOSINOPHIL NFR BLD: 0.1 % (ref 0–8)
ERYTHROCYTE [DISTWIDTH] IN BLOOD BY AUTOMATED COUNT: 13.6 % (ref 11.5–14.5)
EST. GFR  (NO RACE VARIABLE): >60 ML/MIN/1.73 M^2
GLUCOSE SERPL-MCNC: 84 MG/DL (ref 70–110)
HCT VFR BLD AUTO: 41.6 % (ref 40–54)
HGB BLD-MCNC: 13.5 G/DL (ref 14–18)
IMM GRANULOCYTES # BLD AUTO: 0.02 K/UL (ref 0–0.04)
IMM GRANULOCYTES NFR BLD AUTO: 0.2 % (ref 0–0.5)
LYMPHOCYTES # BLD AUTO: 1.7 K/UL (ref 1–4.8)
LYMPHOCYTES NFR BLD: 17.9 % (ref 18–48)
MCH RBC QN AUTO: 28 PG (ref 27–31)
MCHC RBC AUTO-ENTMCNC: 32.5 G/DL (ref 32–36)
MCV RBC AUTO: 86 FL (ref 82–98)
MONOCYTES # BLD AUTO: 0.8 K/UL (ref 0.3–1)
MONOCYTES NFR BLD: 8.1 % (ref 4–15)
NEUTROPHILS # BLD AUTO: 7 K/UL (ref 1.8–7.7)
NEUTROPHILS NFR BLD: 73.3 % (ref 38–73)
NRBC BLD-RTO: 0 /100 WBC
PLATELET # BLD AUTO: 238 K/UL (ref 150–450)
PMV BLD AUTO: 9.5 FL (ref 9.2–12.9)
POTASSIUM SERPL-SCNC: 3.4 MMOL/L (ref 3.5–5.1)
PROT SERPL-MCNC: 7.4 G/DL (ref 6–8.4)
RBC # BLD AUTO: 4.83 M/UL (ref 4.6–6.2)
SODIUM SERPL-SCNC: 139 MMOL/L (ref 136–145)
TROPONIN I SERPL DL<=0.01 NG/ML-MCNC: 60 NG/L (ref 0–35)
TROPONIN I SERPL DL<=0.01 NG/ML-MCNC: 68 NG/L (ref 0–35)
WBC # BLD AUTO: 9.52 K/UL (ref 3.9–12.7)

## 2025-01-25 PROCEDURE — 94640 AIRWAY INHALATION TREATMENT: CPT | Mod: XB

## 2025-01-25 PROCEDURE — 94761 N-INVAS EAR/PLS OXIMETRY MLT: CPT

## 2025-01-25 PROCEDURE — 25000242 PHARM REV CODE 250 ALT 637 W/ HCPCS: Performed by: STUDENT IN AN ORGANIZED HEALTH CARE EDUCATION/TRAINING PROGRAM

## 2025-01-25 PROCEDURE — 25000003 PHARM REV CODE 250: Performed by: FAMILY MEDICINE

## 2025-01-25 PROCEDURE — 85025 COMPLETE CBC W/AUTO DIFF WBC: CPT | Performed by: STUDENT IN AN ORGANIZED HEALTH CARE EDUCATION/TRAINING PROGRAM

## 2025-01-25 PROCEDURE — 84484 ASSAY OF TROPONIN QUANT: CPT | Performed by: STUDENT IN AN ORGANIZED HEALTH CARE EDUCATION/TRAINING PROGRAM

## 2025-01-25 PROCEDURE — 27000221 HC OXYGEN, UP TO 24 HOURS

## 2025-01-25 PROCEDURE — 96374 THER/PROPH/DIAG INJ IV PUSH: CPT

## 2025-01-25 PROCEDURE — G0378 HOSPITAL OBSERVATION PER HR: HCPCS

## 2025-01-25 PROCEDURE — 63600175 PHARM REV CODE 636 W HCPCS: Performed by: STUDENT IN AN ORGANIZED HEALTH CARE EDUCATION/TRAINING PROGRAM

## 2025-01-25 PROCEDURE — 83880 ASSAY OF NATRIURETIC PEPTIDE: CPT | Performed by: STUDENT IN AN ORGANIZED HEALTH CARE EDUCATION/TRAINING PROGRAM

## 2025-01-25 PROCEDURE — 80053 COMPREHEN METABOLIC PANEL: CPT | Performed by: STUDENT IN AN ORGANIZED HEALTH CARE EDUCATION/TRAINING PROGRAM

## 2025-01-25 PROCEDURE — 25000003 PHARM REV CODE 250: Performed by: STUDENT IN AN ORGANIZED HEALTH CARE EDUCATION/TRAINING PROGRAM

## 2025-01-25 PROCEDURE — 25500020 PHARM REV CODE 255: Performed by: FAMILY MEDICINE

## 2025-01-25 PROCEDURE — 99900035 HC TECH TIME PER 15 MIN (STAT)

## 2025-01-25 PROCEDURE — 99285 EMERGENCY DEPT VISIT HI MDM: CPT | Mod: 25

## 2025-01-25 PROCEDURE — 25000242 PHARM REV CODE 250 ALT 637 W/ HCPCS: Performed by: FAMILY MEDICINE

## 2025-01-25 RX ORDER — ACETAMINOPHEN 325 MG/1
650 TABLET ORAL EVERY 4 HOURS PRN
Status: DISCONTINUED | OUTPATIENT
Start: 2025-01-25 | End: 2025-01-27 | Stop reason: HOSPADM

## 2025-01-25 RX ORDER — FINASTERIDE 5 MG/1
5 TABLET, FILM COATED ORAL DAILY
Status: DISCONTINUED | OUTPATIENT
Start: 2025-01-26 | End: 2025-01-27 | Stop reason: HOSPADM

## 2025-01-25 RX ORDER — NALOXONE HCL 0.4 MG/ML
0.02 VIAL (ML) INJECTION
Status: DISCONTINUED | OUTPATIENT
Start: 2025-01-25 | End: 2025-01-27 | Stop reason: HOSPADM

## 2025-01-25 RX ORDER — IPRATROPIUM BROMIDE AND ALBUTEROL SULFATE 2.5; .5 MG/3ML; MG/3ML
3 SOLUTION RESPIRATORY (INHALATION)
Status: COMPLETED | OUTPATIENT
Start: 2025-01-25 | End: 2025-01-25

## 2025-01-25 RX ORDER — DOXYCYCLINE HYCLATE 100 MG
100 TABLET ORAL EVERY 12 HOURS
Status: DISCONTINUED | OUTPATIENT
Start: 2025-01-25 | End: 2025-01-27 | Stop reason: HOSPADM

## 2025-01-25 RX ORDER — AMLODIPINE BESYLATE 5 MG/1
5 TABLET ORAL DAILY
Status: DISCONTINUED | OUTPATIENT
Start: 2025-01-26 | End: 2025-01-27 | Stop reason: HOSPADM

## 2025-01-25 RX ORDER — IBUPROFEN 200 MG
16 TABLET ORAL
Status: DISCONTINUED | OUTPATIENT
Start: 2025-01-25 | End: 2025-01-27 | Stop reason: HOSPADM

## 2025-01-25 RX ORDER — IBUPROFEN 200 MG
24 TABLET ORAL
Status: DISCONTINUED | OUTPATIENT
Start: 2025-01-25 | End: 2025-01-27 | Stop reason: HOSPADM

## 2025-01-25 RX ORDER — FLUTICASONE FUROATE AND VILANTEROL 200; 25 UG/1; UG/1
1 POWDER RESPIRATORY (INHALATION) DAILY
Status: DISCONTINUED | OUTPATIENT
Start: 2025-01-26 | End: 2025-01-27 | Stop reason: HOSPADM

## 2025-01-25 RX ORDER — TAMSULOSIN HYDROCHLORIDE 0.4 MG/1
0.8 CAPSULE ORAL DAILY
Status: DISCONTINUED | OUTPATIENT
Start: 2025-01-26 | End: 2025-01-27 | Stop reason: HOSPADM

## 2025-01-25 RX ORDER — MORPHINE SULFATE 2 MG/ML
2 INJECTION, SOLUTION INTRAMUSCULAR; INTRAVENOUS EVERY 4 HOURS PRN
Status: DISCONTINUED | OUTPATIENT
Start: 2025-01-25 | End: 2025-01-26

## 2025-01-25 RX ORDER — GLUCAGON 1 MG
1 KIT INJECTION
Status: DISCONTINUED | OUTPATIENT
Start: 2025-01-25 | End: 2025-01-27 | Stop reason: HOSPADM

## 2025-01-25 RX ORDER — GABAPENTIN 300 MG/1
600 CAPSULE ORAL 3 TIMES DAILY
Status: DISCONTINUED | OUTPATIENT
Start: 2025-01-25 | End: 2025-01-27 | Stop reason: HOSPADM

## 2025-01-25 RX ORDER — ATORVASTATIN CALCIUM 40 MG/1
40 TABLET, FILM COATED ORAL DAILY
Status: DISCONTINUED | OUTPATIENT
Start: 2025-01-26 | End: 2025-01-27 | Stop reason: HOSPADM

## 2025-01-25 RX ORDER — MONTELUKAST SODIUM 10 MG/1
10 TABLET ORAL NIGHTLY
Status: DISCONTINUED | OUTPATIENT
Start: 2025-01-25 | End: 2025-01-27 | Stop reason: HOSPADM

## 2025-01-25 RX ORDER — SODIUM CHLORIDE 0.9 % (FLUSH) 0.9 %
10 SYRINGE (ML) INJECTION EVERY 8 HOURS PRN
Status: DISCONTINUED | OUTPATIENT
Start: 2025-01-25 | End: 2025-01-27 | Stop reason: HOSPADM

## 2025-01-25 RX ORDER — OSELTAMIVIR PHOSPHATE 75 MG/1
75 CAPSULE ORAL 2 TIMES DAILY
Status: DISCONTINUED | OUTPATIENT
Start: 2025-01-25 | End: 2025-01-27 | Stop reason: HOSPADM

## 2025-01-25 RX ORDER — IPRATROPIUM BROMIDE AND ALBUTEROL SULFATE 2.5; .5 MG/3ML; MG/3ML
3 SOLUTION RESPIRATORY (INHALATION)
Status: DISCONTINUED | OUTPATIENT
Start: 2025-01-25 | End: 2025-01-26

## 2025-01-25 RX ORDER — ACETAMINOPHEN 500 MG
1000 TABLET ORAL
Status: COMPLETED | OUTPATIENT
Start: 2025-01-25 | End: 2025-01-25

## 2025-01-25 RX ORDER — METOPROLOL SUCCINATE 25 MG/1
25 TABLET, EXTENDED RELEASE ORAL DAILY
Status: DISCONTINUED | OUTPATIENT
Start: 2025-01-26 | End: 2025-01-27

## 2025-01-25 RX ORDER — BUMETANIDE 1 MG/1
2 TABLET ORAL 2 TIMES DAILY
Status: DISCONTINUED | OUTPATIENT
Start: 2025-01-25 | End: 2025-01-27 | Stop reason: HOSPADM

## 2025-01-25 RX ORDER — ONDANSETRON HYDROCHLORIDE 2 MG/ML
4 INJECTION, SOLUTION INTRAVENOUS EVERY 8 HOURS PRN
Status: DISCONTINUED | OUTPATIENT
Start: 2025-01-25 | End: 2025-01-27 | Stop reason: HOSPADM

## 2025-01-25 RX ORDER — SPIRONOLACTONE 25 MG/1
50 TABLET ORAL DAILY
Status: DISCONTINUED | OUTPATIENT
Start: 2025-01-26 | End: 2025-01-27 | Stop reason: HOSPADM

## 2025-01-25 RX ORDER — LEVOTHYROXINE SODIUM 50 UG/1
50 TABLET ORAL
Status: DISCONTINUED | OUTPATIENT
Start: 2025-01-26 | End: 2025-01-27 | Stop reason: HOSPADM

## 2025-01-25 RX ORDER — ONDANSETRON HYDROCHLORIDE 2 MG/ML
4 INJECTION, SOLUTION INTRAVENOUS
Status: COMPLETED | OUTPATIENT
Start: 2025-01-25 | End: 2025-01-25

## 2025-01-25 RX ADMIN — IPRATROPIUM BROMIDE AND ALBUTEROL SULFATE 3 ML: 2.5; .5 SOLUTION RESPIRATORY (INHALATION) at 01:01

## 2025-01-25 RX ADMIN — MONTELUKAST 10 MG: 10 TABLET, FILM COATED ORAL at 09:01

## 2025-01-25 RX ADMIN — GABAPENTIN 600 MG: 300 CAPSULE ORAL at 09:01

## 2025-01-25 RX ADMIN — DOXYCYCLINE HYCLATE 100 MG: 100 TABLET, COATED ORAL at 09:01

## 2025-01-25 RX ADMIN — IOHEXOL 100 ML: 350 INJECTION, SOLUTION INTRAVENOUS at 05:01

## 2025-01-25 RX ADMIN — OSELTAMIVIR PHOSPHATE 75 MG: 75 CAPSULE ORAL at 09:01

## 2025-01-25 RX ADMIN — APIXABAN 5 MG: 5 TABLET, FILM COATED ORAL at 09:01

## 2025-01-25 RX ADMIN — ACETAMINOPHEN 1000 MG: 500 TABLET ORAL at 01:01

## 2025-01-25 RX ADMIN — IPRATROPIUM BROMIDE AND ALBUTEROL SULFATE 3 ML: 2.5; .5 SOLUTION RESPIRATORY (INHALATION) at 07:01

## 2025-01-25 RX ADMIN — ONDANSETRON 4 MG: 2 INJECTION INTRAMUSCULAR; INTRAVENOUS at 01:01
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
## 2025-01-25 NOTE — ED TRIAGE NOTES
Robinson Reardon , a 64 y.o. male presents to the ED w/ complaint of Left sided chest pain with cough, flu diagnosis on Wednesday    Triage note:  Chief Complaint   Patient presents with    Chest Pain     8/10 c/p L side. Endorses cough, SOB. Discharged Wednesday with flu diagnosis.     Review of patient's allergies indicates:   Allergen Reactions    Ace inhibitors Other (See Comments) and Swelling     angioedema  Face, lips, tongue swelling      Animal dander Itching    Lisinopril Swelling    Betadine [povidone-iodine] Rash     Pt stated when he was donating blood years ago, skin turned another color           APPEARANCE: awake and alert in NAD. PAIN  8/10  SKIN: warm, dry and intact. No breakdown or bruising.  MUSCULOSKELETAL: Patient moving all extremities spontaneously, no obvious swelling or deformities noted. Ambulates independently.  RESPIRATORY: endorses shortness of breath.Respirations unlabored.   CARDIAC: endorses CP, 2+ distal pulses; no peripheral edema  ABDOMEN: S/ND/NT, Denies nausea  : voids spontaneously, denies difficulty  Neurologic: AAO x 4; follows commands equal strength in all extremities; denies numbness/tingling. Denies dizziness

## 2025-01-25 NOTE — TELEPHONE ENCOUNTER
Pt called in stating that he has been having hiccups and left chest discomfort that feels like gas and has had some belching.  States he does not have chest pain. States he is trying to do a breathing treatment right now and asked for call back shortly. Will call pt back in 10 min per his request. Pt called back. States he completed breathing treatment. No CP at present. States he is concerned his sat is 88-91% after treatment. States he thinks his sat was 94-97% upon d/c yesterday. States he has severe WILLARD and feels worse than when he was d/c yesterday. Pt advised to go back to ED now. Pt states he does not drive and does not have a ride readily available. Pt advised to balaji 911 for EMS assistance. Pt agrees with dispo and will call 911 now. Pt advised to call back with concerns or worsening symptoms. Verbalized understanding.      Reason for Disposition   [1] MODERATE difficulty breathing (e.g., speaks in phrases, SOB even at rest, pulse 100 - 120) AND [2] new-onset or worse than normal    Additional Information   Negative: SEVERE difficulty breathing (e.g., struggling for each breath, speaks in single words, pulse > 120)   Negative: Bluish (or gray) lips or face now   Negative: Difficult to awaken or acting confused (e.g., disoriented, slurred speech)   Negative: Slow, shallow and weak breathing   Negative: Sounds like a life-threatening emergency to the triager    Protocols used: Oxygen Monitoring and Sdspiig-H-YI

## 2025-01-25 NOTE — TELEPHONE ENCOUNTER
Pt returning call. States that his O2 stats are now 93-94 %. Reiterated ED dispo from nurse Jessica CANO. Reason for Disposition   Caller has already spoken with another triager and has no further questions.    Protocols used: No Contact or Duplicate Contact Call-A-AH

## 2025-01-26 LAB
ALBUMIN SERPL BCP-MCNC: 2.6 G/DL (ref 3.5–5.2)
ALP SERPL-CCNC: 54 U/L (ref 40–150)
ALT SERPL W/O P-5'-P-CCNC: 16 U/L (ref 10–44)
ANION GAP SERPL CALC-SCNC: 10 MMOL/L (ref 8–16)
AST SERPL-CCNC: 21 U/L (ref 10–40)
BASOPHILS # BLD AUTO: 0.03 K/UL (ref 0–0.2)
BASOPHILS NFR BLD: 0.4 % (ref 0–1.9)
BILIRUB SERPL-MCNC: 0.4 MG/DL (ref 0.1–1)
BUN SERPL-MCNC: 14 MG/DL (ref 8–23)
CALCIUM SERPL-MCNC: 8.8 MG/DL (ref 8.7–10.5)
CHLORIDE SERPL-SCNC: 103 MMOL/L (ref 95–110)
CO2 SERPL-SCNC: 27 MMOL/L (ref 23–29)
CREAT SERPL-MCNC: 1.1 MG/DL (ref 0.5–1.4)
DIFFERENTIAL METHOD BLD: ABNORMAL
EOSINOPHIL # BLD AUTO: 0 K/UL (ref 0–0.5)
EOSINOPHIL NFR BLD: 0.3 % (ref 0–8)
ERYTHROCYTE [DISTWIDTH] IN BLOOD BY AUTOMATED COUNT: 13.5 % (ref 11.5–14.5)
EST. GFR  (NO RACE VARIABLE): >60 ML/MIN/1.73 M^2
GLUCOSE SERPL-MCNC: 84 MG/DL (ref 70–110)
HCT VFR BLD AUTO: 39.2 % (ref 40–54)
HGB BLD-MCNC: 12.3 G/DL (ref 14–18)
IMM GRANULOCYTES # BLD AUTO: 0.02 K/UL (ref 0–0.04)
IMM GRANULOCYTES NFR BLD AUTO: 0.3 % (ref 0–0.5)
LYMPHOCYTES # BLD AUTO: 1.9 K/UL (ref 1–4.8)
LYMPHOCYTES NFR BLD: 25.3 % (ref 18–48)
MAGNESIUM SERPL-MCNC: 2 MG/DL (ref 1.6–2.6)
MCH RBC QN AUTO: 26.9 PG (ref 27–31)
MCHC RBC AUTO-ENTMCNC: 31.4 G/DL (ref 32–36)
MCV RBC AUTO: 86 FL (ref 82–98)
MONOCYTES # BLD AUTO: 0.8 K/UL (ref 0.3–1)
MONOCYTES NFR BLD: 10.4 % (ref 4–15)
NEUTROPHILS # BLD AUTO: 4.8 K/UL (ref 1.8–7.7)
NEUTROPHILS NFR BLD: 63.3 % (ref 38–73)
NRBC BLD-RTO: 0 /100 WBC
OHS QRS DURATION: 98 MS
OHS QTC CALCULATION: 460 MS
PHOSPHATE SERPL-MCNC: 3.3 MG/DL (ref 2.7–4.5)
PLATELET # BLD AUTO: 216 K/UL (ref 150–450)
PMV BLD AUTO: 9.6 FL (ref 9.2–12.9)
POTASSIUM SERPL-SCNC: 3.6 MMOL/L (ref 3.5–5.1)
PROT SERPL-MCNC: 6.1 G/DL (ref 6–8.4)
RBC # BLD AUTO: 4.57 M/UL (ref 4.6–6.2)
SODIUM SERPL-SCNC: 140 MMOL/L (ref 136–145)
WBC # BLD AUTO: 7.62 K/UL (ref 3.9–12.7)

## 2025-01-26 PROCEDURE — 25000003 PHARM REV CODE 250: Performed by: FAMILY MEDICINE

## 2025-01-26 PROCEDURE — 94761 N-INVAS EAR/PLS OXIMETRY MLT: CPT

## 2025-01-26 PROCEDURE — 99900035 HC TECH TIME PER 15 MIN (STAT)

## 2025-01-26 PROCEDURE — 84100 ASSAY OF PHOSPHORUS: CPT | Performed by: FAMILY MEDICINE

## 2025-01-26 PROCEDURE — G0378 HOSPITAL OBSERVATION PER HR: HCPCS

## 2025-01-26 PROCEDURE — 25000242 PHARM REV CODE 250 ALT 637 W/ HCPCS: Performed by: FAMILY MEDICINE

## 2025-01-26 PROCEDURE — 85025 COMPLETE CBC W/AUTO DIFF WBC: CPT | Performed by: FAMILY MEDICINE

## 2025-01-26 PROCEDURE — 94640 AIRWAY INHALATION TREATMENT: CPT | Mod: XB

## 2025-01-26 PROCEDURE — 27000221 HC OXYGEN, UP TO 24 HOURS

## 2025-01-26 PROCEDURE — 96376 TX/PRO/DX INJ SAME DRUG ADON: CPT

## 2025-01-26 PROCEDURE — 63600175 PHARM REV CODE 636 W HCPCS: Performed by: FAMILY MEDICINE

## 2025-01-26 PROCEDURE — 80053 COMPREHEN METABOLIC PANEL: CPT | Performed by: FAMILY MEDICINE

## 2025-01-26 PROCEDURE — 96375 TX/PRO/DX INJ NEW DRUG ADDON: CPT

## 2025-01-26 PROCEDURE — 83735 ASSAY OF MAGNESIUM: CPT | Performed by: FAMILY MEDICINE

## 2025-01-26 RX ORDER — CEFPODOXIME PROXETIL 200 MG/1
200 TABLET, FILM COATED ORAL EVERY 12 HOURS
Status: DISCONTINUED | OUTPATIENT
Start: 2025-01-26 | End: 2025-01-27 | Stop reason: HOSPADM

## 2025-01-26 RX ORDER — HYDROCODONE BITARTRATE AND ACETAMINOPHEN 5; 325 MG/1; MG/1
1 TABLET ORAL EVERY 6 HOURS PRN
Status: DISCONTINUED | OUTPATIENT
Start: 2025-01-26 | End: 2025-01-27 | Stop reason: HOSPADM

## 2025-01-26 RX ORDER — METHOCARBAMOL 500 MG/1
1000 TABLET, FILM COATED ORAL 4 TIMES DAILY
Status: DISCONTINUED | OUTPATIENT
Start: 2025-01-26 | End: 2025-01-27 | Stop reason: HOSPADM

## 2025-01-26 RX ORDER — LIDOCAINE 50 MG/G
1 PATCH TOPICAL
Status: DISCONTINUED | OUTPATIENT
Start: 2025-01-26 | End: 2025-01-27 | Stop reason: HOSPADM

## 2025-01-26 RX ORDER — IPRATROPIUM BROMIDE AND ALBUTEROL SULFATE 2.5; .5 MG/3ML; MG/3ML
3 SOLUTION RESPIRATORY (INHALATION) EVERY 4 HOURS
Status: DISCONTINUED | OUTPATIENT
Start: 2025-01-26 | End: 2025-01-27

## 2025-01-26 RX ADMIN — SPIRONOLACTONE 50 MG: 50 TABLET ORAL at 08:01

## 2025-01-26 RX ADMIN — BUMETANIDE 2 MG: 1 TABLET ORAL at 08:01

## 2025-01-26 RX ADMIN — LEVOTHYROXINE SODIUM 50 MCG: 0.05 TABLET ORAL at 05:01

## 2025-01-26 RX ADMIN — APIXABAN 5 MG: 5 TABLET, FILM COATED ORAL at 08:01

## 2025-01-26 RX ADMIN — FINASTERIDE 5 MG: 5 TABLET, FILM COATED ORAL at 08:01

## 2025-01-26 RX ADMIN — GABAPENTIN 600 MG: 300 CAPSULE ORAL at 08:01

## 2025-01-26 RX ADMIN — IPRATROPIUM BROMIDE AND ALBUTEROL SULFATE 3 ML: 2.5; .5 SOLUTION RESPIRATORY (INHALATION) at 01:01

## 2025-01-26 RX ADMIN — TAMSULOSIN HYDROCHLORIDE 0.8 MG: 0.4 CAPSULE ORAL at 08:01

## 2025-01-26 RX ADMIN — OSELTAMIVIR PHOSPHATE 75 MG: 75 CAPSULE ORAL at 08:01

## 2025-01-26 RX ADMIN — AMLODIPINE BESYLATE 5 MG: 5 TABLET ORAL at 08:01

## 2025-01-26 RX ADMIN — IPRATROPIUM BROMIDE AND ALBUTEROL SULFATE 3 ML: 2.5; .5 SOLUTION RESPIRATORY (INHALATION) at 11:01

## 2025-01-26 RX ADMIN — CEFPODOXIME PROXETIL 200 MG: 200 TABLET, FILM COATED ORAL at 09:01

## 2025-01-26 RX ADMIN — GABAPENTIN 600 MG: 300 CAPSULE ORAL at 02:01

## 2025-01-26 RX ADMIN — BUMETANIDE 2 MG: 1 TABLET ORAL at 05:01

## 2025-01-26 RX ADMIN — DOXYCYCLINE HYCLATE 100 MG: 100 TABLET, COATED ORAL at 08:01

## 2025-01-26 RX ADMIN — HYDROCODONE BITARTRATE AND ACETAMINOPHEN 1 TABLET: 5; 325 TABLET ORAL at 05:01

## 2025-01-26 RX ADMIN — ATORVASTATIN CALCIUM 40 MG: 40 TABLET, FILM COATED ORAL at 08:01

## 2025-01-26 RX ADMIN — MORPHINE SULFATE 2 MG: 2 INJECTION, SOLUTION INTRAMUSCULAR; INTRAVENOUS at 01:01

## 2025-01-26 RX ADMIN — LIDOCAINE 1 PATCH: 50 PATCH CUTANEOUS at 05:01

## 2025-01-26 RX ADMIN — IPRATROPIUM BROMIDE AND ALBUTEROL SULFATE 3 ML: 2.5; .5 SOLUTION RESPIRATORY (INHALATION) at 08:01

## 2025-01-26 RX ADMIN — MORPHINE SULFATE 2 MG: 2 INJECTION, SOLUTION INTRAMUSCULAR; INTRAVENOUS at 09:01

## 2025-01-26 RX ADMIN — METOPROLOL SUCCINATE 25 MG: 25 TABLET, EXTENDED RELEASE ORAL at 08:01

## 2025-01-26 NOTE — HOSPITAL COURSE
Admitted to hospital medicine on 01/26 following discharge from OSH 01/24 for which the pt was admitted for COPD exacerbation associated with influenza and bacterial pneumonia.  Once pt arrived home, pt reported SOB and chest pain.  Pt says home O2 concentrator broken so he did not have access to O2 once home.  Supplemental O2 provided for sats 88-92%, cefpodoxime and doxycycline for pneumonia, Tamiflu for influenza, prescriptions provided on discharge.  Bronchodilators continued for COPD, no signs of exacerbation.  Pt did not need any refills for home medications.  Pt also reported chest pain reproducible on palpation, likely costochondritis.  Troponin mildly elevated but remained flat.  Echo with EF 50-55%, WMA present, pt says he will follow up with his outpatient cardiologist.  He may benefit from outpatient stress test.  Chest wall tenderness improved and SOB resolved with supplemental O2.  EUGENIA/SEE assisted with obtaining home O2 and pt medically stable for discharge with close follow up to PCP, Cardiology and pulmonology.    Pt deemed appropriate for discharge; seen and examined prior to departure.  The patient's chronic medical conditions were managed appropriately. Discharge plan of care was discussed at length with patient including patient's need for close outpatient follow-up. Medication counseling also took place with the patient being educated on potential side effects/adverse events. Patient also counseled about avoiding driving, operating machinery, or participating in any activities that may pose harm to self or others if they are taking medications that cause drowsiness or altered mental status. Patient also counseled to take medicines as prescribed and do not drink alcohol, use tobacco products, or use illicit substances. Patient counseled to return to the hospital or seek medical care if baseline status should suddenly change, return of symptoms occurs, or for any new or concerning symptoms that  arise. Patient was in agreement with plan of care going forward and was then discharged.
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
## 2025-01-26 NOTE — SUBJECTIVE & OBJECTIVE
Subjective/Interval History:  Pt denies any SOB at rest on O2.  Denies any chest pain at rest on O2.  Cough has improved.  Denies any fevers or chills.  Left-sided chest pain tender to palpation.    Labs personally reviewed:  WBC 7.62, Na 140, K 3.6, Cr 1.1, Mg 2.0, initial troponin 68 followed by 60    EKG personally reviewed:  Normal sinus rhythm, no acute ischemia    Review of Systems: See subjective/interval history  Objective:     Vital Signs (Most Recent):  Temp: 99.1 °F (37.3 °C) (01/26/25 1536)  Pulse: (!) 59 (01/26/25 1536)  Resp: 19 (01/26/25 1536)  BP: 129/83 (01/26/25 1536)  SpO2: 96 % (01/26/25 1536) Vital Signs (24h Range):  Temp:  [98.1 °F (36.7 °C)-99.1 °F (37.3 °C)] 99.1 °F (37.3 °C)  Pulse:  [55-88] 59  Resp:  [16-21] 19  SpO2:  [92 %-98 %] 96 %  BP: (109-129)/(69-84) 129/83     Weight: 104.3 kg (229 lb 15 oz)  Body mass index is 32.07 kg/m².    Intake/Output Summary (Last 24 hours) at 1/26/2025 1630  Last data filed at 1/26/2025 1227  Gross per 24 hour   Intake --   Output 800 ml   Net -800 ml         Physical Exam  Vitals reviewed.   Constitutional:       Appearance: He is not toxic-appearing.   HENT:      Head: Normocephalic and atraumatic.      Mouth/Throat:      Mouth: Mucous membranes are moist.   Eyes:      General: No scleral icterus.  Cardiovascular:      Rate and Rhythm: Normal rate and regular rhythm.      Heart sounds: Normal heart sounds. No murmur heard.  Pulmonary:      Effort: Pulmonary effort is normal.      Breath sounds: Wheezing (and expiratory right-greater-than-left) present.      Comments: Diminished breath sounds right-greater-than-left  Chest:      Chest wall: Tenderness (Left-sided reproducible chest pain) present.   Abdominal:      General: Bowel sounds are normal. There is no distension.      Palpations: Abdomen is soft.      Tenderness: There is no abdominal tenderness.   Musculoskeletal:      Right lower leg: No edema.   Skin:     General: Skin is warm and dry.    Neurological:      General: No focal deficit present.      Mental Status: He is alert.   Psychiatric:         Mood and Affect: Mood normal.         Behavior: Behavior normal.       Significant Labs: All pertinent labs within the past 24 hours have been reviewed.    Significant Imaging: I have reviewed all pertinent imaging results/findings within the past 24 hours.
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
## 2025-01-26 NOTE — ASSESSMENT & PLAN NOTE
- Duonebs q.4 hours  - home LABA-ICS  - supplemental O2 and monitor PulseOx  - Patient will need SW/CM assistance to ensure home O2 available on discharge

## 2025-01-26 NOTE — ED NOTES
Dr Rios notified that pt did not receive the Bumex ordered because it did not arrive from pharmacy. Awaiting response to see if he would still like pt to have medication.

## 2025-01-26 NOTE — ED NOTES
Telemetry Verification   Patient placed on Telemetry Box  Verified with War Room  Box # [id number]   Monitor Tech Jaye   Rate 56   Rhythm Sinus Damaso

## 2025-01-26 NOTE — ASSESSMENT & PLAN NOTE
- Duonebs q6h while awake  - home LABA-ICS  - supplemental O2 and monitor PulseOx  - Patient will need SW/CM assistance to ensure home O2 available on discharge
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
## 2025-01-26 NOTE — ED NOTES
6 minute walk test completed. Pt was able to ambulate with the assistance of his personal walking device. Prior to test pt had a breathing treatment to assist with opening airway flow to the lungs. Pt was able to complete walk test with 1 short stop towards the on the test. Pt's oxygen saturation  remained at 92% due the majority of the test. Pt's oxygen saturation level decreased to 89% for 3-10 seconds due to pt attempting to walk at a faster pace. Once rested pt's oxygen saturation levels increased and remained at 92%. MD notified.

## 2025-01-26 NOTE — ED NOTES
Msg sent to OLGA LIDIA Dominguez to verify if Bumex medication should be reordered. Awaiting response.

## 2025-01-26 NOTE — PHARMACY MED REC
"    Admission Medication History     The home medication history was taken by Xenia Gupta.    You may go to "Admission" then "Reconcile Home Medications" tabs to review and/or act upon these items.     The home medication list has been updated by the Pharmacy department.   Please read ALL comments highlighted in yellow.   Please address this information as you see fit.    Feel free to contact us if you have any questions or require assistance.      The medications listed below were removed from the home medication list. Please reorder if appropriate:  Patient reports no longer taking the following medication(s):  Vibramycin   Lidoderm    Medications listed below were obtained from: Patient/family and Analytic software- Ayi Laile  Current Outpatient Medications on File Prior to Encounter   Medication Sig    albuterol (ACCUNEB) 1.25 mg/3 mL Nebu TAKE 3MLS BY NUBULIZATION EVERY 6 HOURS AS NEEDED FOR WHEEZING      albuterol (PROVENTIL/VENTOLIN HFA) 90 mcg/actuation inhaler Inhale 2 puffs into the lungs every 4 (four) hours as needed for Wheezing or Shortness of Breath. Rescue      apixaban (ELIQUIS) 5 mg Tab Take 5 mg by mouth 2 (two) times daily.      atorvastatin (LIPITOR) 40 MG tablet Take 40 mg by mouth once daily.      bumetanide (BUMEX) 2 MG tablet Take 2 mg by mouth 2 (two) times a day.      cetirizine (ZYRTEC) 10 MG tablet Take 10 mg by mouth once daily.      ciclopirox (PENLAC) 8 % Soln Apply topically nightly.      finasteride (PROSCAR) 5 mg tablet Take 5 mg by mouth once daily.      fluticasone propionate (FLONASE) 50 mcg/actuation nasal spray 2 sprays by Each Nostril route once daily.      gabapentin (NEURONTIN) 600 MG tablet Take 1 tablet (600 mg total) by mouth 3 (three) times daily.      levothyroxine (SYNTHROID) 50 MCG tablet Take 50 mcg by mouth before breakfast.      methocarbamol (ROBAXIN) 500 MG Tab Take 500 mg by mouth 2 (two) times daily as needed Muscle relaxan      metoprolol succinate (TOPROL-XL) " 25 MG 24 hr tablet Take 25 mg by mouth once daily.      montelukast (SINGULAIR) 10 mg tablet Take 10 mg by mouth every evening.      oseltamivir (TAMIFLU) 75 MG capsule Take 1 capsule (75 mg total) by mouth 2 (two) times daily       spironolactone (ALDACTONE) 25 MG tablet Take 25 mg by mouth once daily.      tamsulosin (FLOMAX) 0.4 mg Cp24 Take 0.8 mg by mouth once daily.      TRELEGY ELLIPTA 200-62.5-25 mcg inhaler Inhale 1 puff into the lungs once daily.         Xenia Gupta  [id number]              .

## 2025-01-26 NOTE — ED NOTES
Bed: EDOU08  Expected date:   Expected time:   Means of arrival:   Comments:  CCR 6   Returned phone call to patient's mother. Informed that patient was added to schedule for May 9 at 1:45 along with sibling. Mother voiced understanding and thanks.

## 2025-01-26 NOTE — PLAN OF CARE
Case Management Assessment     PCP: Correct in Epic   Pharmacy: Bedside     Patient Arrived From: Home   Existing Help at Home: Family     Barriers to Discharge: Transportation     Discharge Plan:    A. Home     B. Home with home health   [facility]  Initial Discharge Assessment       Primary Care Provider: Carol Dumont MD    Admission Diagnosis: Chest pain [R07.9]    Admission Date: 1/25/2025  Expected Discharge Date: 1/26/2025    Transition of Care Barriers: (P) None    Payor: HU[employer] MANAGED MEDICARE / Plan: HU[employer] Epur[employer] HMO PPO SPECIAL NEEDS / Product Type: Medicare Advantage /     Extended Emergency Contact Information  Primary Emergency Contact: Christine Martinez   [place]  Mobile Phone: [phone]  Relation: Sister    Discharge Plan A: (P) Home with family, Home Health  Discharge Plan B: (P) Home with family, Home Health      [facility]  [address]  [facility] [address]  Phone: [phone] Fax: [phone]    [id number] - [place], [place] - [address] AT [facility] [address]  [address]  [place] [address]  Phone: [phone] Fax: [phone]    [id number] - [place], [place] - [address] AT [facility] [place] [address]  [address]  [place] [address]  Phone: [phone] Fax: [phone]      Initial Assessment (most recent)       Adult Discharge Assessment - 01/26/25 1503          Discharge Assessment    Assessment Type Discharge Planning Assessment (P)      Confirmed/corrected address, phone number and insurance Yes (P)      Confirmed Demographics Correct on Facesheet (P)      Source of Information patient;health record (P)      People in Home alone (P)      Do you expect to return to your current living situation? Yes (P)      Do you have help at home or someone to help you manage your care at home? Yes (P)      Prior to hospitilization cognitive status:  Alert/Oriented (P)      Current cognitive status: Alert/Oriented (P)      Walking or Climbing Stairs Difficulty yes (P)      Walking or Climbing Stairs ambulation difficulty, requires equipment (P)      Dressing/Bathing Difficulty no (P)      Equipment Currently Used at Home oxygen;cane, straight (P)      Readmission within 30 days? No (P)      Patient currently being followed by outpatient case management? No (P)      Do you currently have service(s) that help you manage your care at home? No (P)      Do you take prescription medications? Yes (P)      Do you have prescription coverage? Yes (P)      Do you have any problems affording any of your prescribed medications? No (P)      Is the patient taking medications as prescribed? yes (P)      How do you get to doctors appointments? family or friend will provide (P)      Are you on dialysis? No (P)      Do you take coumadin? No (P)      Discharge Plan A Home with family;Home Health (P)      Discharge Plan B Home with family;Home Health (P)      DME Needed Upon Discharge  none (P)      Discharge Plan discussed with: Patient (P)      Transition of Care Barriers None (P)         Physical Activity    On average, how many days per week do you engage in moderate to strenuous exercise (like a brisk walk)? 0 days (P)      On average, how many minutes do you engage in exercise at this level? 0 min (P)         Financial Resource Strain    How hard is it for you to pay for the very basics like food, housing, medical care, and heating? Not hard at all (P)         Housing Stability    In the last 12 months, was there a time when you were not able to pay the mortgage or rent on time? No (P)         Transportation Needs    Has the lack of transportation kept you from medical appointments, meetings, work or from getting things needed for daily living? Yes, it has kept me from medical appointments or from getting my medications. (P)         Food Insecurity    Within the past 12  months, you worried that your food would run out before you got the money to buy more. Never true (P)      Within the past 12 months, the food you bought just didn't last and you didn't have money to get more. Never true (P)         Stress    Do you feel stress - tense, restless, nervous, or anxious, or unable to sleep at night because your mind is troubled all the time - these days? Not at all (P)         Social Isolation    How often do you feel lonely or isolated from those around you?  Never (P)         Alcohol Use    Q1: How often do you have a drink containing alcohol? Never (P)      Q2: How many drinks containing alcohol do you have on a typical day when you are drinking? Patient does not drink (P)      Q3: How often do you have six or more drinks on one occasion? Never (P)         Utilities    In the past 12 months has the electric, gas, oil, or water company threatened to shut off services in your home? No (P)         Health Literacy    How often do you need to have someone help you when you read instructions, pamphlets, or other written material from your doctor or pharmacy? Never (P)

## 2025-01-26 NOTE — ASSESSMENT & PLAN NOTE
- improved after placing back on home O2. Suspect related to hypoxia in setting of not having home O2 available at home. Also may have some degree of pleuritis/pleurisy given PNA/Flu but symptoms currently controlled so more likely related to the hypoxia  - Trop trended flat  - monitor tele  - abx for PNA  - duonebs for COPD  - CM/SW consult in am to assist with setting up home O2

## 2025-01-26 NOTE — ASSESSMENT & PLAN NOTE
- improved after placing back on home O2. Suspect related to hypoxia in setting of not having home O2 available at home.   - Also may have some degree of pleuritis/pleurisy/costochondritis given PNA/Flu.  Continue MM pain management  - Trop trended flat  - monitor tele  - antibiotics PNA  - duonebs for COPD  - CM/SW consult in am to assist with setting up home O2

## 2025-01-26 NOTE — HPI
"64 y.o. male with CHF, COPD on 2L home O2, HLD, HTN, JEFFERSON, history of stroke presents for shortness of breath and chest pain. Patient reports left-sided chest pain that is nonradiating. The chest pain is associated with shortness of breath and occasionally associated with exertion. He was recently admitted with influenza and pneumonia, COPD exacerbation and discharged one day ago on doxy and tamiflu and stable on home 2L O2. Patient reports that he did not have access to oxygen at his home reportedly because "rats have chewed up compressor/cord/tubing". He reports compliance with his home medications otherwise and states he has been taking tamiflu and doxy as prescribed.    In the ED patient afebrile and hemodynamically stable, hypoxic on room air with improved saturations on home O2. Patient also reports improved chest pain symptoms after being placed on home O2. Trop mildly elevated and repeat flat. Admitted to the care of medicine for further observation and management.   "

## 2025-01-26 NOTE — ED NOTES
Pt is complaining of 10/10 chest pain during ambulation. Pt PRN has medication available for administration.

## 2025-01-27 VITALS
SYSTOLIC BLOOD PRESSURE: 118 MMHG | BODY MASS INDEX: 32.1 KG/M2 | DIASTOLIC BLOOD PRESSURE: 83 MMHG | HEIGHT: 71 IN | WEIGHT: 229.25 LBS | RESPIRATION RATE: 20 BRPM | TEMPERATURE: 98 F | OXYGEN SATURATION: 93 % | HEART RATE: 70 BPM

## 2025-01-27 LAB
ASCENDING AORTA: 3.7 CM
AV AREA BY CONTINUOUS VTI: 4.3 CM2
AV INDEX (PROSTH): 0.95
AV LVOT MEAN GRADIENT: 2 MMHG
AV LVOT PEAK GRADIENT: 3 MMHG
AV MEAN GRADIENT: 3 MMHG
AV PEAK GRADIENT: 6 MMHG
AV VALVE AREA BY VELOCITY RATIO: 3.4 CM²
AV VALVE AREA: 4.3 CM2
AV VELOCITY RATIO: 0.75
BSA FOR ECHO PROCEDURE: 2.28 M2
CV ECHO LV RWT: 0.38 CM
DOP CALC AO PEAK VEL: 1.2 M/S
DOP CALC AO VTI: 19.9 CM
DOP CALC LVOT AREA: 4.5 CM2
DOP CALC LVOT DIAMETER: 2.4 CM
DOP CALC LVOT PEAK VEL: 0.9 M/S
DOP CALC LVOT STROKE VOLUME: 85.5 CM3
DOP CALCLVOT PEAK VEL VTI: 18.9 CM
E WAVE DECELERATION TIME: 314 MS
E/A RATIO: 0.54
E/E' RATIO: 9 M/S
ECHO EF ESTIMATED: 52 %
ECHO LV POSTERIOR WALL: 1.1 CM (ref 0.6–1.1)
FRACTIONAL SHORTENING: 25.9 % (ref 28–44)
INTERVENTRICULAR SEPTUM: 1.2 CM (ref 0.6–1.1)
LA MAJOR: 5.5 CM
LA MINOR: 5.4 CM
LA WIDTH: 4.3 CM
LEFT ATRIUM SIZE: 3.6 CM
LEFT ATRIUM VOLUME INDEX MOD: 32 ML/M2
LEFT ATRIUM VOLUME INDEX: 32 ML/M2
LEFT ATRIUM VOLUME MOD: 71 ML
LEFT ATRIUM VOLUME: 72 CM3
LEFT INTERNAL DIMENSION IN SYSTOLE: 4.3 CM (ref 2.1–4)
LEFT VENTRICLE DIASTOLIC VOLUME INDEX: 75.33 ML/M2
LEFT VENTRICLE DIASTOLIC VOLUME: 167.98 ML
LEFT VENTRICLE MASS INDEX: 125.8 G/M2
LEFT VENTRICLE SYSTOLIC VOLUME INDEX: 36.4 ML/M2
LEFT VENTRICLE SYSTOLIC VOLUME: 81.22 ML
LEFT VENTRICULAR INTERNAL DIMENSION IN DIASTOLE: 5.8 CM (ref 3.5–6)
LEFT VENTRICULAR MASS: 280.4 G
LV LATERAL E/E' RATIO: 6.7
LV SEPTAL E/E' RATIO: 11.8
MV PEAK A VEL: 0.87 M/S
MV PEAK E VEL: 0.47 M/S
OHS CV RV/LV RATIO: 0.6 CM
RA MAJOR: 4.55 CM
RA PRESSURE ESTIMATED: 0 MMHG
RA WIDTH: 4.22 CM
RIGHT VENTRICLE DIASTOLIC BASEL DIMENSION: 3.5 CM
SINUS: 3.9 CM
STJ: 3.5 CM
TDI LATERAL: 0.07 M/S
TDI SEPTAL: 0.04 M/S
TDI: 0.06 M/S
TRICUSPID ANNULAR PLANE SYSTOLIC EXCURSION: 1.8 CM
Z-SCORE OF LEFT VENTRICULAR DIMENSION IN END DIASTOLE: -3.07
Z-SCORE OF LEFT VENTRICULAR DIMENSION IN END SYSTOLE: -0.83

## 2025-01-27 PROCEDURE — 27000190 HC CPAP FULL FACE MASK W/VALVE

## 2025-01-27 PROCEDURE — 94761 N-INVAS EAR/PLS OXIMETRY MLT: CPT

## 2025-01-27 PROCEDURE — 25000003 PHARM REV CODE 250: Performed by: FAMILY MEDICINE

## 2025-01-27 PROCEDURE — G0378 HOSPITAL OBSERVATION PER HR: HCPCS

## 2025-01-27 PROCEDURE — 94660 CPAP INITIATION&MGMT: CPT

## 2025-01-27 PROCEDURE — 94640 AIRWAY INHALATION TREATMENT: CPT | Mod: XB

## 2025-01-27 PROCEDURE — 27000221 HC OXYGEN, UP TO 24 HOURS

## 2025-01-27 PROCEDURE — 25000242 PHARM REV CODE 250 ALT 637 W/ HCPCS: Performed by: FAMILY MEDICINE

## 2025-01-27 PROCEDURE — 99900035 HC TECH TIME PER 15 MIN (STAT)

## 2025-01-27 RX ORDER — OSELTAMIVIR PHOSPHATE 75 MG/1
75 CAPSULE ORAL 2 TIMES DAILY
Qty: 8 CAPSULE | Refills: 0 | Status: SHIPPED | OUTPATIENT
Start: 2025-01-27 | End: 2025-01-31

## 2025-01-27 RX ORDER — HYDROCODONE BITARTRATE AND ACETAMINOPHEN 5; 325 MG/1; MG/1
1 TABLET ORAL EVERY 6 HOURS PRN
Qty: 20 TABLET | Refills: 0 | Status: SHIPPED | OUTPATIENT
Start: 2025-01-27 | End: 2025-02-01

## 2025-01-27 RX ORDER — CEFPODOXIME PROXETIL 200 MG/1
200 TABLET, FILM COATED ORAL EVERY 12 HOURS
Qty: 8 TABLET | Refills: 0 | Status: SHIPPED | OUTPATIENT
Start: 2025-01-27 | End: 2025-01-31

## 2025-01-27 RX ORDER — AMLODIPINE BESYLATE 5 MG/1
5 TABLET ORAL DAILY
Qty: 30 TABLET | Refills: 0 | Status: SHIPPED | OUTPATIENT
Start: 2025-01-28 | End: 2025-02-27

## 2025-01-27 RX ORDER — SPIRONOLACTONE 50 MG/1
50 TABLET, FILM COATED ORAL DAILY
Start: 2025-01-28 | End: 2026-01-28

## 2025-01-27 RX ORDER — IPRATROPIUM BROMIDE AND ALBUTEROL SULFATE 2.5; .5 MG/3ML; MG/3ML
3 SOLUTION RESPIRATORY (INHALATION) EVERY 6 HOURS
Status: DISCONTINUED | OUTPATIENT
Start: 2025-01-27 | End: 2025-01-27 | Stop reason: HOSPADM

## 2025-01-27 RX ORDER — DOXYCYCLINE HYCLATE 100 MG
100 TABLET ORAL EVERY 12 HOURS
Qty: 8 TABLET | Refills: 0 | Status: SHIPPED | OUTPATIENT
Start: 2025-01-27 | End: 2025-01-31

## 2025-01-27 RX ADMIN — DOXYCYCLINE HYCLATE 100 MG: 100 TABLET, COATED ORAL at 09:01

## 2025-01-27 RX ADMIN — OSELTAMIVIR PHOSPHATE 75 MG: 75 CAPSULE ORAL at 12:01

## 2025-01-27 RX ADMIN — METOPROLOL SUCCINATE 12.5 MG: 25 TABLET, EXTENDED RELEASE ORAL at 09:01

## 2025-01-27 RX ADMIN — GABAPENTIN 600 MG: 300 CAPSULE ORAL at 12:01

## 2025-01-27 RX ADMIN — CEFPODOXIME PROXETIL 200 MG: 200 TABLET, FILM COATED ORAL at 10:01

## 2025-01-27 RX ADMIN — FLUTICASONE FUROATE AND VILANTEROL TRIFENATATE 1 PUFF: 200; 25 POWDER RESPIRATORY (INHALATION) at 09:01

## 2025-01-27 RX ADMIN — ATORVASTATIN CALCIUM 40 MG: 40 TABLET, FILM COATED ORAL at 09:01

## 2025-01-27 RX ADMIN — IPRATROPIUM BROMIDE AND ALBUTEROL SULFATE 3 ML: 2.5; .5 SOLUTION RESPIRATORY (INHALATION) at 08:01

## 2025-01-27 RX ADMIN — AMLODIPINE BESYLATE 5 MG: 5 TABLET ORAL at 09:01

## 2025-01-27 RX ADMIN — BUMETANIDE 2 MG: 1 TABLET ORAL at 09:01

## 2025-01-27 RX ADMIN — CEFPODOXIME PROXETIL 200 MG: 200 TABLET, FILM COATED ORAL at 12:01

## 2025-01-27 RX ADMIN — METHOCARBAMOL 1000 MG: 500 TABLET ORAL at 09:01

## 2025-01-27 RX ADMIN — APIXABAN 5 MG: 5 TABLET, FILM COATED ORAL at 09:01

## 2025-01-27 RX ADMIN — APIXABAN 5 MG: 5 TABLET, FILM COATED ORAL at 12:01

## 2025-01-27 RX ADMIN — LEVOTHYROXINE SODIUM 50 MCG: 0.05 TABLET ORAL at 05:01

## 2025-01-27 RX ADMIN — IPRATROPIUM BROMIDE AND ALBUTEROL SULFATE 3 ML: 2.5; .5 SOLUTION RESPIRATORY (INHALATION) at 04:01

## 2025-01-27 RX ADMIN — MONTELUKAST 10 MG: 10 TABLET, FILM COATED ORAL at 12:01

## 2025-01-27 RX ADMIN — DOXYCYCLINE HYCLATE 100 MG: 100 TABLET, COATED ORAL at 12:01

## 2025-01-27 RX ADMIN — FINASTERIDE 5 MG: 5 TABLET, FILM COATED ORAL at 10:01

## 2025-01-27 RX ADMIN — SPIRONOLACTONE 50 MG: 50 TABLET ORAL at 09:01

## 2025-01-27 RX ADMIN — OSELTAMIVIR PHOSPHATE 75 MG: 75 CAPSULE ORAL at 09:01

## 2025-01-27 RX ADMIN — HYDROCODONE BITARTRATE AND ACETAMINOPHEN 1 TABLET: 5; 325 TABLET ORAL at 12:01

## 2025-01-27 RX ADMIN — METHOCARBAMOL 1000 MG: 500 TABLET ORAL at 12:01

## 2025-01-27 RX ADMIN — GABAPENTIN 600 MG: 300 CAPSULE ORAL at 03:01

## 2025-01-27 RX ADMIN — TAMSULOSIN HYDROCHLORIDE 0.8 MG: 0.4 CAPSULE ORAL at 09:01

## 2025-01-27 RX ADMIN — TIOTROPIUM BROMIDE INHALATION SPRAY 2 PUFF: 3.12 SPRAY, METERED RESPIRATORY (INHALATION) at 09:01

## 2025-01-27 RX ADMIN — GABAPENTIN 600 MG: 300 CAPSULE ORAL at 09:01

## 2025-01-27 RX ADMIN — IPRATROPIUM BROMIDE AND ALBUTEROL SULFATE 3 ML: 2.5; .5 SOLUTION RESPIRATORY (INHALATION) at 01:01

## 2025-01-27 NOTE — NURSING
Home Oxygen Evaluation    Date Performed: 2025    1) Patient's Home O2 Sat on room air, while at rest: 90        If O2 sats on room air at rest are 88% or below, patient qualifies. No additional testing needed. Document N/A in steps 2 and 3. If 89% or above, complete steps 2.      2) Patient's O2 Sat on room air while exercisin        If O2 sats on room air while exercising remain 89% or above patient does not qualify, no further testing needed Document N/A in step 3. If O2 sats on room air while exercising are 88% or below, continue to step 3.      3) Patient's O2 Sat while exercising on O2: 2 at 94 LPM         (Must show improvement from #2 for patients to qualify)    If O2 sats improve on oxygen, patient qualifies for portable oxygen. If not, the patient does not qualify.

## 2025-01-27 NOTE — PLAN OF CARE
[facility]      HOME HEALTH ORDERS  FACE TO FACE ENCOUNTER    Patient Name: Robinson Reardon Sr.  YOB: 1960    PCP: Carol Dumont MD   PCP Address: [address] [facility]  PCP Phone Number: [phone]  PCP Fax: [phone]    Encounter Date: 1/25/25    Admit to Home Health    Diagnoses:  Active Hospital Problems    Diagnosis  POA    *Chest pain [R07.9]  Yes     Priority: 1 - High    Influenza A [J10.1]  Yes     Priority: 5     COPD (chronic obstructive pulmonary disease) [J44.9]  Yes     Priority: 5     Chronic anticoagulation [Z79.01]  Not Applicable    Community acquired pneumonia [J18.9]  Yes    Sleep apnea [G47.30]  Yes    History of CVA with residual deficit [I69.30]  Not Applicable      Resolved Hospital Problems   No resolved problems to display.       Follow Up Appointments:  Future Appointments   Date Time Provider Department Center   2/5/2025  9:30 AM Johnathan Myers NP [facility]   2/12/2025  1:45 PM Laura Cline MD [facility]       Allergies:  Review of patient's allergies indicates:   Allergen Reactions    Ace inhibitors Other (See Comments) and Swelling     angioedema  Face, lips, tongue swelling      Animal dander Itching    Lisinopril Swelling    Betadine [povidone-iodine] Rash     Pt stated when he was donating blood years ago, skin turned another color       Medications: Review discharge medications with patient and family and provide education.    Current Facility-Administered Medications   Medication Dose Route Frequency Provider Last Rate Last Admin    acetaminophen tablet 650 mg  650 mg Oral Q4H PRN Ty Lind MD        albuterol-ipratropium 2.5 mg-0.5 mg/3 mL nebulizer solution 3 mL  3 mL Nebulization Q6H Chepe Rios III, MD   3 mL at 01/27/25 1317    amLODIPine tablet 5 mg  5 mg Oral Daily Ty Lind MD   5 mg at 01/27/25 0917    apixaban tablet 5 mg  5 mg Oral BID  Ty Lind MD   5 mg at 01/27/25 0916    atorvastatin tablet 40 mg  40 mg Oral Daily Ty Lind MD   40 mg at 01/27/25 0916    bumetanide tablet 2 mg  2 mg Oral BID loop Ty Lind MD   2 mg at 01/27/25 0916    cefpodoxime tablet 200 mg  200 mg Oral Q12H Chepe Rios III, MD   200 mg at 01/27/25 1000    dextrose 50% injection 12.5 g  12.5 g Intravenous PRN Ty Lind MD        dextrose 50% injection 25 g  25 g Intravenous PRN Ty Lind MD        doxycycline tablet 100 mg  100 mg Oral Q12H Chepe Rios III, MD   100 mg at 01/27/25 0917    finasteride tablet 5 mg  5 mg Oral Daily Ty Lind MD   5 mg at 01/27/25 1033    fluticasone furoate-vilanteroL 200-25 mcg/dose diskus inhaler 1 puff  1 puff Inhalation Daily Ty Lind MD   1 puff at 01/27/25 0900    gabapentin capsule 600 mg  600 mg Oral TID Ty Lind MD   600 mg at 01/27/25 1500    glucagon (human recombinant) injection 1 mg  1 mg Intramuscular PRN Ty Lind MD        glucose chewable tablet 16 g  16 g Oral PRN Ty Lind MD        glucose chewable tablet 24 g  24 g Oral PRN Ty Lind MD        HYDROcodone-acetaminophen 5-325 mg per tablet 1 tablet  1 tablet Oral Q6H PRN Chepe Rios III, MD   1 tablet at 01/27/25 0025    levothyroxine tablet 50 mcg  50 mcg Oral Before breakfast Ty Lind MD   50 mcg at 01/27/25 0556    LIDOcaine 5 % patch 1 patch  1 patch Transdermal Q24H Chepe Rios III, MD   1 patch at 01/26/25 1702    methocarbamoL tablet 1,000 mg  1,000 mg Oral QID Chepe Rios III, MD   1,000 mg at 01/27/25 1223    metoprolol succinate (TOPROL-XL) 24 hr split tablet 12.5 mg  12.5 mg Oral Daily Chepe Rios III, MD   12.5 mg at 01/27/25 0916    montelukast tablet 10 mg  10 mg Oral QHS Ty Lind MD   10 mg at 01/27/25 0034    naloxone 0.4 mg/mL injection 0.02 mg  0.02 mg Intravenous PRN  Ty Lind MD        ondansetron injection 4 mg  4 mg Intravenous Q8H PRN Ty Lind MD        oseltamivir capsule 75 mg  75 mg Oral BID Chepe Rios III, MD   75 mg at 01/27/25 0916    sodium chloride 0.9% flush 10 mL  10 mL Intravenous Q8H PRN Ty Lind MD        spironolactone tablet 50 mg  50 mg Oral Daily Ty Lind MD   50 mg at 01/27/25 0917    tamsulosin 24 hr capsule 0.8 mg  0.8 mg Oral Daily Ty Lind MD   0.8 mg at 01/27/25 0917    tiotropium bromide 2.5 mcg/actuation inhaler 2 puff  2 puff Inhalation Daily Ty Lind MD   2 puff at 01/27/25 0908        Medication List        START taking these medications      cefpodoxime 200 MG tablet  Commonly known as: VANTIN  Take 1 tablet (200 mg total) by mouth every 12 (twelve) hours. for 4 days     doxycycline 100 MG tablet  Commonly known as: VIBRA-TABS  Take 1 tablet (100 mg total) by mouth every 12 (twelve) hours. for 4 days  Replaces: doxycycline 100 MG Cap     HYDROcodone-acetaminophen 5-325 mg per tablet  Commonly known as: NORCO  Take 1 tablet by mouth every 6 (six) hours as needed.            CONTINUE taking these medications      * albuterol 90 mcg/actuation inhaler  Commonly known as: PROVENTIL/VENTOLIN HFA  Inhale 2 puffs into the lungs every 4 (four) hours as needed for Wheezing or Shortness of Breath. Rescue     * albuterol 1.25 mg/3 mL Nebu  Commonly known as: ACCUNEB  TAKE 3MLS BY NUBULIZATION EVERY 6 HOURS AS NEEDED FOR WHEEZING     amLODIPine 5 MG tablet  Commonly known as: NORVASC  Take 1 tablet (5 mg total) by mouth once daily.  Start taking on: January 28, 2025     apixaban 5 mg Tab  Commonly known as: ELIQUIS  Take 5 mg by mouth 2 (two) times daily.     atorvastatin 40 MG tablet  Commonly known as: LIPITOR  Take 40 mg by mouth once daily.     bumetanide 2 MG tablet  Commonly known as: BUMEX  Take 2 mg by mouth 2 (two) times a day.     cetirizine 10 MG tablet  Commonly known as:  ZYRTEC  Take 10 mg by mouth once daily.     ciclopirox 8 % Soln  Commonly known as: PENLAC  Apply topically nightly.     finasteride 5 mg tablet  Commonly known as: PROSCAR  Take 5 mg by mouth once daily.     fluticasone propionate 50 mcg/actuation nasal spray  Commonly known as: FLONASE  2 sprays by Each Nostril route once daily.     gabapentin 600 MG tablet  Commonly known as: NEURONTIN  Take 1 tablet (600 mg total) by mouth 3 (three) times daily.     levothyroxine 50 MCG tablet  Commonly known as: SYNTHROID  Take 50 mcg by mouth before breakfast.     methocarbamoL 500 MG Tab  Commonly known as: ROBAXIN  Take 500 mg by mouth 2 (two) times daily as needed (Muscle relaxant).     metoprolol succinate 25 MG 24 hr tablet  Commonly known as: TOPROL-XL  Take 25 mg by mouth once daily.     montelukast 10 mg tablet  Commonly known as: SINGULAIR  Take 10 mg by mouth every evening.     oseltamivir 75 MG capsule  Commonly known as: TAMIFLU  Take 1 capsule (75 mg total) by mouth 2 (two) times daily. for 4 days     spironolactone 50 MG tablet  Commonly known as: ALDACTONE  Take 1 tablet (50 mg total) by mouth once daily.  Start taking on: January 28, 2025     tamsulosin 0.4 mg Cap  Commonly known as: FLOMAX  Take 0.8 mg by mouth once daily.     TRELEGY ELLIPTA 200-62.5-25 mcg inhaler  Generic drug: fluticasone-umeclidin-vilanter  Inhale 1 puff into the lungs once daily.           * This list has 2 medication(s) that are the same as other medications prescribed for you. Read the directions carefully, and ask your doctor or other care provider to review them with you.                STOP taking these medications      doxycycline 100 MG Cap  Commonly known as: VIBRAMYCIN  Replaced by: doxycycline 100 MG tablet                I have seen and examined this patient within the last 30 days. My clinical findings that support the need for the home health skilled services and home bound status are the following:no   Weakness/numbness  causing balance and gait disturbance due to Infection making it taxing to leave home.     Diet:   regular diet    Labs:  Routine labs prior to or at PCP follow up      Activities:   activity as tolerated    Nursing:   Agency to admit patient within 24 hours of hospital discharge unless specified on physician order or at patient request    SN to complete comprehensive assessment including routine vital signs. Instruct on disease process and s/s of complications to report to MD. Review/verify medication list sent home with the patient at time of discharge  and instruct patient/caregiver as needed. Frequency may be adjusted depending on start of care date.     Skilled nurse to perform up to 3 visits PRN for symptoms related to diagnosis    Notify MD if SBP > 160 or < 90; DBP > 90 or < 50; HR > 120 or < 50; Temp > 101; O2 < 88%    Ok to schedule additional visits based on staff availability and patient request on consecutive days within the home health episode.    When multiple disciplines ordered:    Start of Care occurs on Sunday - Wednesday schedule remaining discipline evaluations as ordered on separate consecutive days following the start of care.    Thursday SOC -schedule subsequent evaluations Friday and Monday the following week.     Friday - Saturday SOC - schedule subsequent discipline evaluations on consecutive days starting Monday of the following week.    For all post-discharge communication and subsequent orders please contact patient's primary care physician.     Miscellaneous   Home Oxygen:  Oxygen at 4 L/min nasal canula to be used:  As needed for SOB.  Maintain saturation 88-92%    Home Health Aide:  Nursing Three times weekly    Wound Care Orders  no    I certify that this patient is confined to his home and needs intermittent skilled nursing care.

## 2025-01-27 NOTE — DISCHARGE INSTRUCTIONS
Seek medical attention for worsening chest pain, shortness of breath, or developing other symptoms such as palpitations, abdominal pain, nausea/vomiting, fevers/chills.     Avoid tobacco products, alcohol, illicit substances.    Obtain a pulse oximeter. If oxygen saturation remains continuously less than 88% despite home O2, seek medical attention.    Norco is a sedating medication so do not operate heavy machinery when using and try to avoid combining with other sedating meds.    Continue antibiotics for pneumonia, cefpodoxime and doxycycline for 8 more doses.  Continue Tamiflu for influenza, 8 more doses as well.    Continue home blood pressure medications.  Check your blood pressure twice daily. Write down blood pressure in a journal. Bring this journal to primary care physician for further medication adjustments.    Resume albuterol and Trelegy as previously taking for COPD. Follow up with pulmonologist at next scheduled appt.    Follow-up with PCP within 1-2 weeks. It is important to note that each of these medications that I have prescribed typically will not have refills. This is so your primary care physician or other specialists in the outpatient setting can review your progress and determine if these medications are to be continued. If they determine the medications need to be continued, make sure that you remind them about refills at your follow-up appointments.

## 2025-01-27 NOTE — PLAN OF CARE
01/27/25 1541   Post-Acute Status   Post-Acute Authorization Home Health   Home Health Status Referrals Sent     Pt is expected to discharge home with home health. SW sent referrals via Epic and is waiting for an accepting agency. Providers that are owned, operated, or affiliated with [facility] are included on the list.    Discharge Plan A and Plan B have been determined by review of patient's clinical status, future medical and therapeutic needs, and coverage/benefits for post-acute care in coordination with multidisciplinary team members.    SEE will continue to follow up.      Ruthy Bingham LMSW  [facility]  Ext. [phone]

## 2025-01-28 LAB
BACTERIA BLD CULT: NORMAL
BACTERIA BLD CULT: NORMAL

## 2025-01-28 NOTE — PLAN OF CARE
[facility]  Discharge Final Note    Primary Care Provider: Carol Dumont MD    Expected Discharge Date: 1/27/2025    Patient discharged to home via personal transportation.     Patient's bedside nurse and patient notified of the above.    Discharge Plan A and Plan B have been determined by review of patient's clinical status, future medical and therapeutic needs, and coverage/benefits for post-acute care in coordination with multidisciplinary team members.        Final Discharge Note (most recent)       Final Note - 01/28/25 0908          Final Note    Assessment Type Final Discharge Note (P)      Anticipated Discharge Disposition Home-Health Care Svc (P)         Post-Acute Status    Post-Acute Authorization Home Health (P)      Home Health Status Set-up Complete/Auth obtained (P)                      Important Message from Medicare             Contact Info       Carol Dumont MD   Specialty: Family Medicine   Relationship: PCP - General    [address] [facility] [address]   Phone: [phone]       Next Steps: Schedule an appointment as soon as possible for a visit    Instructions: Schedule hospital follow up appointment with PCP within 48 hours of discharge.            Future Appointments   Date Time Provider Department Center   2/5/2025  9:30 AM [facility]   2/12/2025  1:45 PM Laura Cline MD [facility]       SW scheduled post-discharge follow-up appointment and information added to AVS.     Patient has been accepted with [facility].      Ruthy Bingham LMSW  [facility]  Ext. [phone]

## 2025-01-28 NOTE — NURSING
Pt transported down via wheelchair by me. Going home via Lyft.  Oxygen tank and all belongings with pt.

## 2025-01-28 NOTE — PLAN OF CARE
SEE set up transportation via lyft as asked by nurse. According to nurse the patient is able to get in and out of  vehicle without assistance. Patient does not have any DME and the patient has means to get in his home safely.     SEE set up lyft ride:   Fadi is arriving at [facility] in 1 min  [phone]  [address]  [id number]     Nurse was made aware . Ext. [phone]    ZENY Soliz, MSW-LMSW  Medical Social Worker/  ER Department

## 2025-01-28 NOTE — PLAN OF CARE
Problem: Adult Inpatient Plan of Care  Goal: Plan of Care Review  Outcome: Met  Goal: Patient-Specific Goal (Individualized)  Outcome: Met  Goal: Absence of Hospital-Acquired Illness or Injury  Outcome: Met  Goal: Optimal Comfort and Wellbeing  Outcome: Met  Goal: Readiness for Transition of Care  Outcome: Met     Problem: Skin Injury Risk Increased  Goal: Skin Health and Integrity  Outcome: Met     Problem: Pneumonia  Goal: Fluid Balance  Outcome: Met  Goal: Resolution of Infection Signs and Symptoms  Outcome: Met  Goal: Effective Oxygenation and Ventilation  Outcome: Met     Problem: Infection  Goal: Absence of Infection Signs and Symptoms  Outcome: Met     Problem: Wound  Goal: Optimal Coping  Outcome: Met  Goal: Optimal Functional Ability  Outcome: Met  Goal: Absence of Infection Signs and Symptoms  Outcome: Met  Goal: Improved Oral Intake  Outcome: Met  Goal: Optimal Pain Control and Function  Outcome: Met  Goal: Skin Health and Integrity  Outcome: Met  Goal: Optimal Wound Healing  Outcome: Met  Discharge instructions are given , patient anxious for d/c home , placed d/c w/c ride via computer per MS Iris , patient continues to wait for transport and Lyft ride , updates given to charge nurse Shaina DARLING

## 2025-01-28 NOTE — PLAN OF CARE
01/28/25 0907   Post-Acute Status   Post-Acute Authorization Home Health   Home Health Status Set-up Complete/Auth obtained     SW received notification that pt has been accepted with [facility].      Ruthy Bingham LMSW  [facility]  Ext. [phone]

## 2025-01-31 ENCOUNTER — PATIENT OUTREACH (OUTPATIENT)
Dept: ADMINISTRATIVE | Facility: CLINIC | Age: 65
End: 2025-01-31
Payer: MEDICARE

## 2025-01-31 NOTE — PROGRESS NOTES
C3 nurse attempted to contact Robinson Reardon Sr.  for a TCC post hospital discharge follow up call. No answer. Left voicemail with callback information. The patient does not have a scheduled HOSFU appointment. Non [facility] PCP staff.

## 2025-02-01 NOTE — PROGRESS NOTES
C3 nurse spoke with Robinson Reardon Sr.  for a TCC post hospital discharge follow up call. The patient has a scheduled HOSFU appointment with Carol Dumont MD on 01/31/2025 @ 1000 per patient during TCC call.
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
## 2025-02-05 ENCOUNTER — HOSPITAL ENCOUNTER (OUTPATIENT)
Dept: RADIOLOGY | Facility: HOSPITAL | Age: 65
Discharge: HOME | End: 2025-02-05
Attending: NURSE PRACTITIONER
Payer: MEDICARE

## 2025-02-05 ENCOUNTER — OFFICE VISIT (OUTPATIENT)
Dept: ORTHOPEDICS | Facility: CLINIC | Age: 65
End: 2025-02-05
Payer: MEDICARE

## 2025-02-05 DIAGNOSIS — Z96.641 STATUS POST RIGHT HIP REPLACEMENT: ICD-10-CM

## 2025-02-05 DIAGNOSIS — M25.551 RIGHT HIP PAIN: Primary | ICD-10-CM

## 2025-02-05 DIAGNOSIS — M25.551 RIGHT HIP PAIN: ICD-10-CM

## 2025-02-05 PROCEDURE — 1159F MED LIST DOCD IN RCRD: CPT | Mod: CPTII,S$GLB,, | Performed by: NURSE PRACTITIONER

## 2025-02-05 PROCEDURE — 1111F DSCHRG MED/CURRENT MED MERGE: CPT | Mod: CPTII,S$GLB,, | Performed by: NURSE PRACTITIONER

## 2025-02-05 PROCEDURE — 73502 X-RAY EXAM HIP UNI 2-3 VIEWS: CPT | Mod: 26,RT,, | Performed by: RADIOLOGY

## 2025-02-05 PROCEDURE — 99999 PR PBB SHADOW E&M-EST. PATIENT-LVL III: CPT | Mod: PBBFAC,,, | Performed by: NURSE PRACTITIONER

## 2025-02-05 PROCEDURE — 73502 X-RAY EXAM HIP UNI 2-3 VIEWS: CPT | Mod: TC,RT

## 2025-02-05 PROCEDURE — 3288F FALL RISK ASSESSMENT DOCD: CPT | Mod: CPTII,S$GLB,, | Performed by: NURSE PRACTITIONER

## 2025-02-05 PROCEDURE — 1101F PT FALLS ASSESS-DOCD LE1/YR: CPT | Mod: CPTII,S$GLB,, | Performed by: NURSE PRACTITIONER

## 2025-02-05 PROCEDURE — 1160F RVW MEDS BY RX/DR IN RCRD: CPT | Mod: CPTII,S$GLB,, | Performed by: NURSE PRACTITIONER

## 2025-02-05 PROCEDURE — 1125F AMNT PAIN NOTED PAIN PRSNT: CPT | Mod: CPTII,S$GLB,, | Performed by: NURSE PRACTITIONER

## 2025-02-05 PROCEDURE — 99214 OFFICE O/P EST MOD 30 MIN: CPT | Mod: S$GLB,,, | Performed by: NURSE PRACTITIONER
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
## 2025-02-12 ENCOUNTER — HOSPITAL ENCOUNTER (OUTPATIENT)
Dept: RADIOLOGY | Facility: OTHER | Age: 65
Discharge: HOME | End: 2025-02-12
Attending: NURSE PRACTITIONER
Payer: MEDICARE

## 2025-02-12 ENCOUNTER — OFFICE VISIT (OUTPATIENT)
Dept: PAIN MEDICINE | Facility: CLINIC | Age: 65
End: 2025-02-12
Payer: MEDICARE

## 2025-02-12 VITALS
TEMPERATURE: 99 F | SYSTOLIC BLOOD PRESSURE: 124 MMHG | RESPIRATION RATE: 18 BRPM | HEART RATE: 69 BPM | WEIGHT: 229.25 LBS | OXYGEN SATURATION: 98 % | BODY MASS INDEX: 31.98 KG/M2 | DIASTOLIC BLOOD PRESSURE: 82 MMHG

## 2025-02-12 DIAGNOSIS — M25.551 RIGHT HIP PAIN: ICD-10-CM

## 2025-02-12 DIAGNOSIS — M51.360 DEGENERATION OF INTERVERTEBRAL DISC OF LUMBAR REGION WITH DISCOGENIC BACK PAIN: ICD-10-CM

## 2025-02-12 DIAGNOSIS — Z96.641 STATUS POST RIGHT HIP REPLACEMENT: ICD-10-CM

## 2025-02-12 DIAGNOSIS — M47.816 LUMBAR SPONDYLOSIS: Primary | ICD-10-CM

## 2025-02-12 PROCEDURE — 99214 OFFICE O/P EST MOD 30 MIN: CPT | Mod: S$GLB,,, | Performed by: ANESTHESIOLOGY

## 2025-02-12 PROCEDURE — 73700 CT LOWER EXTREMITY W/O DYE: CPT | Mod: 26,RT,, | Performed by: RADIOLOGY

## 2025-02-12 PROCEDURE — 3008F BODY MASS INDEX DOCD: CPT | Mod: CPTII,S$GLB,, | Performed by: ANESTHESIOLOGY

## 2025-02-12 PROCEDURE — 99999 PR PBB SHADOW E&M-EST. PATIENT-LVL IV: CPT | Mod: PBBFAC,,, | Performed by: ANESTHESIOLOGY

## 2025-02-12 PROCEDURE — 1111F DSCHRG MED/CURRENT MED MERGE: CPT | Mod: CPTII,S$GLB,, | Performed by: ANESTHESIOLOGY

## 2025-02-12 PROCEDURE — 1101F PT FALLS ASSESS-DOCD LE1/YR: CPT | Mod: CPTII,S$GLB,, | Performed by: ANESTHESIOLOGY

## 2025-02-12 PROCEDURE — 3074F SYST BP LT 130 MM HG: CPT | Mod: CPTII,S$GLB,, | Performed by: ANESTHESIOLOGY

## 2025-02-12 PROCEDURE — 1159F MED LIST DOCD IN RCRD: CPT | Mod: CPTII,S$GLB,, | Performed by: ANESTHESIOLOGY

## 2025-02-12 PROCEDURE — 3079F DIAST BP 80-89 MM HG: CPT | Mod: CPTII,S$GLB,, | Performed by: ANESTHESIOLOGY

## 2025-02-12 PROCEDURE — 3288F FALL RISK ASSESSMENT DOCD: CPT | Mod: CPTII,S$GLB,, | Performed by: ANESTHESIOLOGY

## 2025-02-12 PROCEDURE — 1125F AMNT PAIN NOTED PAIN PRSNT: CPT | Mod: CPTII,S$GLB,, | Performed by: ANESTHESIOLOGY

## 2025-02-12 PROCEDURE — 73700 CT LOWER EXTREMITY W/O DYE: CPT | Mod: TC,RT
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
## 2025-02-13 ENCOUNTER — TELEPHONE (OUTPATIENT)
Dept: PAIN MEDICINE | Facility: CLINIC | Age: 65
End: 2025-02-13
Payer: MEDICARE

## 2025-02-13 ENCOUNTER — PATIENT MESSAGE (OUTPATIENT)
Dept: PAIN MEDICINE | Facility: OTHER | Age: 65
End: 2025-02-13
Payer: MEDICARE

## 2025-02-13 ENCOUNTER — PATIENT MESSAGE (OUTPATIENT)
Dept: ORTHOPEDICS | Facility: CLINIC | Age: 65
End: 2025-02-13
Payer: MEDICARE

## 2025-02-13 DIAGNOSIS — M47.816 LUMBAR SPONDYLOSIS: Primary | ICD-10-CM

## 2025-02-13 NOTE — TELEPHONE ENCOUNTER
----- Message from Kelly sent at 2/13/2025  7:47 AM CST -----  Name of Caller:     Nature of Call: returning a missed call     Best Call Back Number: [phone]     Additional Information:  Robinson Reardon calling regarding Patient Advice for a missed call from the staff. PT stated that he did not know who called and why. Please call back to inform

## 2025-02-13 NOTE — TELEPHONE ENCOUNTER
Staff called pt about message that was left with the call center. Pt verbalized missing a call I verbalized that it looks like it was from the procedure area.  Staff gave pt procedure area number and also will get the message over top him.

## 2025-02-18 DIAGNOSIS — D17.1 LIPOMA OF SKIN OF ABDOMEN: Primary | ICD-10-CM

## 2025-02-18 DIAGNOSIS — L89.322 STAGE II PRESSURE ULCER OF LEFT BUTTOCK: Primary | ICD-10-CM

## 2025-02-25 ENCOUNTER — TELEPHONE (OUTPATIENT)
Dept: WOUND CARE | Facility: HOSPITAL | Age: 65
End: 2025-02-25
Payer: MEDICARE

## 2025-02-25 NOTE — TELEPHONE ENCOUNTER
Called patient to assist with scheduling a wound care appointment. No answer, left voice message requesting return call to [place] [facility]

## 2025-02-26 ENCOUNTER — HOSPITAL ENCOUNTER (OUTPATIENT)
Dept: RADIOLOGY | Facility: OTHER | Age: 65
Discharge: HOME | End: 2025-02-26
Attending: FAMILY MEDICINE
Payer: MEDICARE

## 2025-02-26 DIAGNOSIS — D17.1 LIPOMA OF SKIN OF ABDOMEN: ICD-10-CM

## 2025-02-26 PROCEDURE — 76705 ECHO EXAM OF ABDOMEN: CPT | Mod: TC

## 2025-02-26 PROCEDURE — 76705 ECHO EXAM OF ABDOMEN: CPT | Mod: 26,,, | Performed by: RADIOLOGY

## 2025-03-06 ENCOUNTER — HOSPITAL ENCOUNTER (OUTPATIENT)
Dept: RADIOLOGY | Facility: OTHER | Age: 65
Discharge: HOME | End: 2025-03-06
Attending: FAMILY MEDICINE
Payer: MEDICARE

## 2025-03-06 DIAGNOSIS — R10.84 GENERALIZED ABDOMINAL PAIN: ICD-10-CM

## 2025-03-06 PROCEDURE — 74019 RADEX ABDOMEN 2 VIEWS: CPT | Mod: TC,FY

## 2025-03-06 PROCEDURE — 74019 RADEX ABDOMEN 2 VIEWS: CPT | Mod: 26,,, | Performed by: RADIOLOGY

## 2025-03-28 DIAGNOSIS — G47.33 OSA (OBSTRUCTIVE SLEEP APNEA): Primary | ICD-10-CM

## 2025-03-31 ENCOUNTER — TELEPHONE (OUTPATIENT)
Dept: PAIN MEDICINE | Facility: CLINIC | Age: 65
End: 2025-03-31
Payer: MEDICARE

## 2025-03-31 ENCOUNTER — HOSPITAL ENCOUNTER (OUTPATIENT)
Facility: OTHER | Age: 65
Discharge: HOME | End: 2025-03-31
Attending: ANESTHESIOLOGY | Admitting: ANESTHESIOLOGY
Payer: MEDICARE

## 2025-03-31 VITALS
DIASTOLIC BLOOD PRESSURE: 110 MMHG | OXYGEN SATURATION: 93 % | TEMPERATURE: 98 F | WEIGHT: 265 LBS | SYSTOLIC BLOOD PRESSURE: 199 MMHG | HEART RATE: 65 BPM | HEIGHT: 71 IN | RESPIRATION RATE: 16 BRPM | BODY MASS INDEX: 37.1 KG/M2

## 2025-03-31 DIAGNOSIS — M47.816 LUMBAR SPONDYLOSIS: Primary | ICD-10-CM

## 2025-03-31 DIAGNOSIS — G89.29 CHRONIC PAIN: ICD-10-CM

## 2025-03-31 PROCEDURE — 64493 INJ PARAVERT F JNT L/S 1 LEV: CPT | Mod: 50,KX,, | Performed by: ANESTHESIOLOGY

## 2025-03-31 PROCEDURE — 63600175 PHARM REV CODE 636 W HCPCS: Performed by: ANESTHESIOLOGY

## 2025-03-31 PROCEDURE — 64493 INJ PARAVERT F JNT L/S 1 LEV: CPT | Mod: 50 | Performed by: ANESTHESIOLOGY

## 2025-03-31 PROCEDURE — 64494 INJ PARAVERT F JNT L/S 2 LEV: CPT | Mod: 50,KX,, | Performed by: ANESTHESIOLOGY

## 2025-03-31 PROCEDURE — 64494 INJ PARAVERT F JNT L/S 2 LEV: CPT | Mod: 50 | Performed by: ANESTHESIOLOGY

## 2025-03-31 RX ORDER — LIDOCAINE HYDROCHLORIDE 20 MG/ML
INJECTION, SOLUTION INFILTRATION; PERINEURAL
Status: DISCONTINUED | OUTPATIENT
Start: 2025-03-31 | End: 2025-03-31 | Stop reason: HOSPADM

## 2025-03-31 RX ORDER — SODIUM CHLORIDE 9 MG/ML
INJECTION, SOLUTION INTRAVENOUS CONTINUOUS
Status: DISCONTINUED | OUTPATIENT
Start: 2025-03-31 | End: 2025-03-31 | Stop reason: HOSPADM

## 2025-03-31 RX ORDER — BUPIVACAINE HYDROCHLORIDE 2.5 MG/ML
INJECTION, SOLUTION EPIDURAL; INFILTRATION; INTRACAUDAL; PERINEURAL
Status: DISCONTINUED | OUTPATIENT
Start: 2025-03-31 | End: 2025-03-31 | Stop reason: HOSPADM

## 2025-03-31 NOTE — TELEPHONE ENCOUNTER
pt had a procedure today and what's to know what was it that he received today, also want if it is okay to take his medication dupixent to injection himself

## 2025-03-31 NOTE — OP NOTE
Diagnostic Lumbar Medial Branch Block Under Fluoroscopy    The procedure, risks, benefits, and options were discussed with the patient. There are no contraindications to the procedure. The patent expressed understanding and agreed to the procedure. Informed written consent was obtained prior to the start of the procedure and can be found in the patient's chart.    PATIENT NAME: Robinson Reardon Sr.   MRN: [id number]     DATE OF PROCEDURE: 03/31/2025                                           PROCEDURE:  Diagnostic Bilateral L3, L4, and L5 Lumbar Medial Branch Block under Fluoroscopy    PRE-OP DIAGNOSIS: Lumbar spondylosis [M47.816] Lumbar spondylosis [M47.816]    POST-OP DIAGNOSIS: Same    PHYSICIAN: Laura Cline MD    ASSISTANTS: Meño Lambert MD  [facility] Pain Fellow      MEDICATIONS INJECTED:  Bupivicaine 0.25%    LOCAL ANESTHETIC INJECTED:   Xylocaine 2%    SEDATION: None    ESTIMATED BLOOD LOSS:  None    COMPLICATIONS:  None.    INTERVAL HISTORY: Patient has clinical and imaging findings suggestive of facet mediated pain.    TECHNIQUE: Time-out was performed to identify the patient and procedure to be performed. With the patient laying in a prone position, the surgical area was prepped and draped in the usual sterile fashion using ChloraPrep and fenestrated drape. The levels were determined under fluoroscopic guidance. Skin anesthesia was achieved by injecting Lidocaine 2% over the injection sites. A 25 gauge, 3.5 inch needle was introduced into the medial branch nerves at the junctions of the superior articular process and the transverse processes of the targeted sites using AP, lateral and/or contralateral oblique fluoroscopic imaging. After negative aspiration for blood or CSF was confirmed, 0.5 mL of the anesthetic listed above was then slowly injected at each site. The needles were removed and bleeding was nil. A sterile dressing was applied. No specimens collected. The patient tolerated the procedure  well.      The patient was monitored after the procedure in the recovery area. They were given post-procedure and discharge instructions to follow at home. The patient was discharged in a stable condition.    Meño Lambert MD     I reviewed and edited the fellow's note. I conducted my own interview and physical examination. I agree with the findings. I was present and supervising all critical portions of the procedure.
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
## 2025-03-31 NOTE — DISCHARGE INSTRUCTIONS

## 2025-03-31 NOTE — DISCHARGE SUMMARY
Discharge Note  Short Stay      SUMMARY     Admit Date: 3/31/2025    Attending Physician: Laura Cline MD    Discharge Physician: Laura Cline MD      Discharge Date: 3/31/2025 10:29 AM    Procedure(s) (LRB):  BLOCK, NERVE BILATERAL L3-5 MEDIAL BRANCH (Bilateral)    Final Diagnosis: Lumbar spondylosis [M47.816]    Disposition: Home or self care    Patient Instructions:   Current Discharge Medication List        CONTINUE these medications which have NOT CHANGED    Details   albuterol (ACCUNEB) 1.25 mg/3 mL Nebu TAKE 3MLS BY NUBULIZATION EVERY 6 HOURS AS NEEDED FOR WHEEZING  Qty: 75 mL, Refills: 0      albuterol (PROVENTIL/VENTOLIN HFA) 90 mcg/actuation inhaler Inhale 2 puffs into the lungs every 4 (four) hours as needed for Wheezing or Shortness of Breath. Rescue      amLODIPine (NORVASC) 5 MG tablet Take 1 tablet (5 mg total) by mouth once daily.  Qty: 30 tablet, Refills: 0    Comments: .      apixaban (ELIQUIS) 5 mg Tab Take 5 mg by mouth 2 (two) times daily.      atorvastatin (LIPITOR) 40 MG tablet Take 40 mg by mouth once daily.      bumetanide (BUMEX) 2 MG tablet Take 2 mg by mouth 2 (two) times a day.      cetirizine (ZYRTEC) 10 MG tablet Take 10 mg by mouth once daily.      ciclopirox (PENLAC) 8 % Soln Apply topically nightly.  Qty: 6.6 mL, Refills: 11      finasteride (PROSCAR) 5 mg tablet Take 5 mg by mouth once daily.      fluticasone propionate (FLONASE) 50 mcg/actuation nasal spray 2 sprays by Each Nostril route once daily.      gabapentin (NEURONTIN) 600 MG tablet Take 1 tablet (600 mg total) by mouth 3 (three) times daily.  Qty: 90 tablet, Refills: 11      levothyroxine (SYNTHROID) 50 MCG tablet Take 50 mcg by mouth before breakfast.      methocarbamol (ROBAXIN) 500 MG Tab Take 500 mg by mouth 2 (two) times daily as needed (Muscle relaxant).      metoprolol succinate (TOPROL-XL) 25 MG 24 hr tablet Take 25 mg by mouth once daily.      montelukast (SINGULAIR) 10 mg tablet Take 10 mg by mouth every  evening.      spironolactone (ALDACTONE) 50 MG tablet Take 1 tablet (50 mg total) by mouth once daily.    Comments: .      tamsulosin (FLOMAX) 0.4 mg Cp24 Take 0.8 mg by mouth once daily.      TRELEGY ELLIPTA 200-62.5-25 mcg inhaler Inhale 1 puff into the lungs once daily.                 Discharge Diagnosis: Lumbar spondylosis [M47.816]  Condition on Discharge: Stable with no complications to procedure   Diet on Discharge: Same as before.  Activity: as per instruction sheet.  Discharge to: Home with a responsible adult.  Follow up: 2-4 weeks       Please call my office or pager at [phone] if experienced any weakness or loss of sensation, fever > 101.5, pain uncontrolled with oral medications, persistent nausea/vomiting/or diarrhea, redness or drainage from the incisions, or any other worrisome concerns. If physician on call was not reached or could not communicate with our office for any reason please go to the nearest emergency department     Meño Lambert M.D.  PGY-5  Interventional Pain Management Fellow  [facility]  Pager: [phone]

## 2025-03-31 NOTE — TELEPHONE ENCOUNTER
----- Message from Summer sent at 3/31/2025  1:41 PM CDT -----  Regarding: patient advice  Type:  Patient Returning Call  Name of who is calling:pt  What is request in detail:pt is requesting a call back in regards to procedure, pt had a procedure today and what's to know what was it that he received today, also want if it is okay to take his medication dupixent to injection himself please assist.   Can clinic reply by [facility]:no  What number to call back if not [facility]: [phone]

## 2025-04-14 DIAGNOSIS — Z12.11 COLON CANCER SCREENING: Primary | ICD-10-CM

## 2025-04-17 ENCOUNTER — TELEPHONE (OUTPATIENT)
Dept: ENDOSCOPY | Facility: HOSPITAL | Age: 65
End: 2025-04-17
Payer: MEDICARE

## 2025-04-17 VITALS — WEIGHT: 260 LBS | BODY MASS INDEX: 36.4 KG/M2 | HEIGHT: 71 IN

## 2025-04-17 DIAGNOSIS — Z12.11 SPECIAL SCREENING FOR MALIGNANT NEOPLASMS, COLON: Primary | ICD-10-CM

## 2025-04-17 NOTE — TELEPHONE ENCOUNTER
Referral for procedure from  Workqueue referral (see Appts tab)      Spoke to pt to schedule procedure(s) Colonoscopy       Physician to perform procedure(s) Dr. Kingston  Date of Procedure (s) 04/22/25  Arrival Time 7:00 AM  Time of Procedure(s) 8:00 AM   Location of Procedure(s) [facility] [address]th Floor  Type of Rx Prep sent to patient: Suprep  Instructions provided to patient via [facility]    Patient was informed on the following information and verbalized understanding. Screening questionnaire reviewed with patient and complete. If procedure requires anesthesia, a responsible adult needs to be present to accompany the patient home, patient cannot drive after receiving anesthesia. Appointment details are tentative, especially check-in time. Patient will receive a prep-op call 7 days prior to confirm check-in time for procedure. If applicable the patient should contact their pharmacy to verify Rx for procedure prep is ready for pick-up. Patient was advised to call the scheduling department at [phone] if pharmacy states no Rx is available. Patient was advised to call the endoscopy scheduling department if any questions or concerns arise.      [facility]oscopy Scheduling Department

## 2025-04-21 ENCOUNTER — TELEPHONE (OUTPATIENT)
Dept: ENDOSCOPY | Facility: HOSPITAL | Age: 65
End: 2025-04-21
Payer: MEDICARE

## 2025-04-21 RX ORDER — SODIUM, POTASSIUM,MAG SULFATES 17.5-3.13G
1 SOLUTION, RECONSTITUTED, ORAL ORAL DAILY
Qty: 1 KIT | Refills: 0 | Status: SHIPPED | OUTPATIENT
Start: 2025-04-21 | End: 2025-04-23

## 2025-04-21 NOTE — TELEPHONE ENCOUNTER
Contacted pt in regards to message about canceling colonoscopy. Pt states he was not able to  his prep medication and he will need to call back at a later time to reschedule.

## 2025-04-27 ENCOUNTER — NURSE TRIAGE (OUTPATIENT)
Dept: ADMINISTRATIVE | Facility: CLINIC | Age: 65
End: 2025-04-27
Payer: MEDICARE

## 2025-04-28 ENCOUNTER — OFFICE VISIT (OUTPATIENT)
Dept: PAIN MEDICINE | Facility: CLINIC | Age: 65
End: 2025-04-28
Payer: MEDICARE

## 2025-04-28 VITALS
WEIGHT: 262.38 LBS | BODY MASS INDEX: 36.73 KG/M2 | RESPIRATION RATE: 12 BRPM | HEIGHT: 71 IN | OXYGEN SATURATION: 100 % | DIASTOLIC BLOOD PRESSURE: 83 MMHG | SYSTOLIC BLOOD PRESSURE: 163 MMHG | HEART RATE: 61 BPM

## 2025-04-28 DIAGNOSIS — M47.816 LUMBAR SPONDYLOSIS: Primary | ICD-10-CM

## 2025-04-28 DIAGNOSIS — G89.29 OTHER CHRONIC PAIN: ICD-10-CM

## 2025-04-28 DIAGNOSIS — M51.360 DEGENERATION OF INTERVERTEBRAL DISC OF LUMBAR REGION WITH DISCOGENIC BACK PAIN: ICD-10-CM

## 2025-04-28 DIAGNOSIS — M47.816 FACET ARTHROPATHY, LUMBAR: ICD-10-CM

## 2025-04-28 PROCEDURE — 1160F RVW MEDS BY RX/DR IN RCRD: CPT | Mod: CPTII,S$GLB,,

## 2025-04-28 PROCEDURE — 3288F FALL RISK ASSESSMENT DOCD: CPT | Mod: CPTII,S$GLB,,

## 2025-04-28 PROCEDURE — 99214 OFFICE O/P EST MOD 30 MIN: CPT | Mod: S$GLB,,,

## 2025-04-28 PROCEDURE — 1101F PT FALLS ASSESS-DOCD LE1/YR: CPT | Mod: CPTII,S$GLB,,

## 2025-04-28 PROCEDURE — 3079F DIAST BP 80-89 MM HG: CPT | Mod: CPTII,S$GLB,,

## 2025-04-28 PROCEDURE — 3077F SYST BP >= 140 MM HG: CPT | Mod: CPTII,S$GLB,,

## 2025-04-28 PROCEDURE — 1125F AMNT PAIN NOTED PAIN PRSNT: CPT | Mod: CPTII,S$GLB,,

## 2025-04-28 PROCEDURE — 3008F BODY MASS INDEX DOCD: CPT | Mod: CPTII,S$GLB,,

## 2025-04-28 PROCEDURE — 1159F MED LIST DOCD IN RCRD: CPT | Mod: CPTII,S$GLB,,

## 2025-04-28 PROCEDURE — 99999 PR PBB SHADOW E&M-EST. PATIENT-LVL IV: CPT | Mod: PBBFAC,,,

## 2025-04-28 PROCEDURE — 4010F ACE/ARB THERAPY RXD/TAKEN: CPT | Mod: CPTII,S$GLB,,
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
## 2025-04-28 NOTE — TELEPHONE ENCOUNTER
Pt reports he was stung by a caterpillar this evening on the back of his neck, still having some neck pain in the area of the sting, has applied hydrocortisone cream to it. Pt advised home care per protocol, Pt encouraged to call back with any worsening symptoms or questions. He verbalized understanding.    Reason for Disposition   Caterpillar sting    Additional Information   Negative: [1] Difficulty breathing or wheezing AND [2] sudden onset following caterpillar sting   Negative: [1] Hoarseness or cough AND [2] sudden onset following caterpillar sting   Negative: [1] Difficulty swallowing or slurred speech AND [2] sudden onset following caterpillar sting   Negative: Sounds like a life-threatening emergency to the triager   Negative: [1] Caterpillar sting AND [2] systemic symptoms (e.g., dizziness, headache, muscle pain, pain shooting to other parts of body, vomiting, widespread hives)   Negative: [1] Caterpillar sting AND [2] eye symptoms (e.g., pain, redness)   Negative: Patient sounds very sick or weak to the triager   Negative: [1] SEVERE pain from caterpillar sting AND [2] not improved after 2 hours of pain medicine   Negative: [1] Fever AND [2] red area   Negative: [1] Fever AND [2] area is very tender to touch   Negative: [1] Red streak or red line AND [2] length > 2 inches (5 cm)   Negative: [1] Red or very tender (to touch) area AND [2] started over 24 hours after the sting   Negative: [1] Red or very tender (to touch) area AND [2] getting larger over 48 hours after the sting   Negative: [1] SEVERE local itching (i.e., interferes with work, school, sleep) AND [2] not improved after 24 hours of hydrocortisone cream   Negative: Caterpillar sting with blisters around sting   Negative: [1] Scab is present AND [2] it drains pus or increases in size AND [3] not improved after applying antibiotic ointment for 2 days   Negative: [1] After 14 days AND [2] caterpillar sting isn't healed    Protocols used:  Caterpillar Sting-A-AH

## 2025-05-12 PROBLEM — M47.816 LUMBAR SPONDYLOSIS: Status: ACTIVE | Noted: 2025-05-12

## 2025-05-13 ENCOUNTER — HOSPITAL ENCOUNTER (OUTPATIENT)
Facility: OTHER | Age: 65
Discharge: HOME | End: 2025-05-13
Attending: ANESTHESIOLOGY | Admitting: ANESTHESIOLOGY
Payer: MEDICARE

## 2025-05-13 VITALS
WEIGHT: 250 LBS | DIASTOLIC BLOOD PRESSURE: 94 MMHG | RESPIRATION RATE: 18 BRPM | OXYGEN SATURATION: 94 % | HEART RATE: 53 BPM | BODY MASS INDEX: 35 KG/M2 | HEIGHT: 71 IN | TEMPERATURE: 98 F | SYSTOLIC BLOOD PRESSURE: 170 MMHG

## 2025-05-13 DIAGNOSIS — M47.816 LUMBAR SPONDYLOSIS: Primary | ICD-10-CM

## 2025-05-13 DIAGNOSIS — G89.29 CHRONIC PAIN: ICD-10-CM

## 2025-05-13 PROCEDURE — 64494 INJ PARAVERT F JNT L/S 2 LEV: CPT | Mod: 50 | Performed by: ANESTHESIOLOGY

## 2025-05-13 PROCEDURE — 64494 INJ PARAVERT F JNT L/S 2 LEV: CPT | Mod: 50,KX,, | Performed by: ANESTHESIOLOGY

## 2025-05-13 PROCEDURE — 63600175 PHARM REV CODE 636 W HCPCS: Performed by: ANESTHESIOLOGY

## 2025-05-13 PROCEDURE — 64493 INJ PARAVERT F JNT L/S 1 LEV: CPT | Mod: 50,KX,, | Performed by: ANESTHESIOLOGY

## 2025-05-13 PROCEDURE — 64493 INJ PARAVERT F JNT L/S 1 LEV: CPT | Mod: 50 | Performed by: ANESTHESIOLOGY

## 2025-05-13 RX ORDER — BUPIVACAINE HYDROCHLORIDE 2.5 MG/ML
INJECTION, SOLUTION EPIDURAL; INFILTRATION; INTRACAUDAL; PERINEURAL
Status: DISCONTINUED | OUTPATIENT
Start: 2025-05-13 | End: 2025-05-13 | Stop reason: HOSPADM

## 2025-05-13 RX ORDER — LIDOCAINE HYDROCHLORIDE 20 MG/ML
INJECTION, SOLUTION INFILTRATION; PERINEURAL
Status: DISCONTINUED | OUTPATIENT
Start: 2025-05-13 | End: 2025-05-13 | Stop reason: HOSPADM

## 2025-05-13 RX ORDER — SODIUM CHLORIDE 9 MG/ML
INJECTION, SOLUTION INTRAVENOUS CONTINUOUS
Status: DISCONTINUED | OUTPATIENT
Start: 2025-05-13 | End: 2025-05-13 | Stop reason: HOSPADM

## 2025-05-13 NOTE — OP NOTE
Diagnostic Lumbar Medial Branch Block Under Fluoroscopy    The procedure, risks, benefits, and options were discussed with the patient. There are no contraindications to the procedure. The patent expressed understanding and agreed to the procedure. Informed written consent was obtained prior to the start of the procedure and can be found in the patient's chart.    PATIENT NAME: Robinson Reardon Sr.   MRN: [id number]     DATE OF PROCEDURE: 05/13/2025                                           PROCEDURE:  Diagnostic Bilateral L3, L4, and L5 Lumbar Medial Branch Block under Fluoroscopy    PRE-OP DIAGNOSIS: Lumbar spondylosis [M47.816] Lumbar spondylosis [M47.816]    POST-OP DIAGNOSIS: Same    PHYSICIAN: Laura Cline MD    ASSISTANTS: Kostas Saunders MD    MEDICATIONS INJECTED:  Bupivicaine 0.25%    LOCAL ANESTHETIC INJECTED:   Xylocaine 2%    SEDATION: None    ESTIMATED BLOOD LOSS:  None    COMPLICATIONS:  None.    INTERVAL HISTORY: Patient has clinical and imaging findings suggestive of facet mediated pain. Patient had a previous diagnostic block performed with at least 80% relief in pain and/or at least 50% improvement in the ability to perform previously painful movements and ADLs for the expected duration of the local anesthetic utilized.    TECHNIQUE: Time-out was performed to identify the patient and procedure to be performed. With the patient laying in a prone position, the surgical area was prepped and draped in the usual sterile fashion using ChloraPrep and fenestrated drape. The levels were determined under fluoroscopic guidance. Skin anesthesia was achieved by injecting Lidocaine 2% over the injection sites. A 22 gauge, 3.5 inch needle was introduced into the medial branch nerves at the junctions of the superior articular process and the transverse processes of the targeted sites using AP, lateral and/or contralateral oblique fluoroscopic imaging. After negative aspiration for blood or CSF was confirmed, 0.5 mL  of the anesthetic listed above was then slowly injected at each site. The needles were removed and bleeding was nil. A sterile dressing was applied. No specimens collected. The patient tolerated the procedure well.    The patient was monitored after the procedure in the recovery area. They were given post-procedure and discharge instructions to follow at home. The patient was discharged in a stable condition.    Terrance Velasco MD     I reviewed and edited the fellow's note. I conducted my own interview and physical examination. I agree with the findings. I was present and supervising all critical portions of the procedure.

## 2025-05-13 NOTE — DISCHARGE INSTRUCTIONS
Thank you for allowing us to care for you today. You may receive a survey about the care we provided. Your feedback is valuable and helps us provide excellent care throughout the community.     Home Care Instructions for Pain Management:    1. DIET:   You may resume your normal diet today.   2. BATHING:   You may shower with luke warm water. No tub baths or anything that will soak injection sites under water for the next 24 hours.  3. DRESSING:   You may remove your bandage today.   4. ACTIVITY LEVEL:   You may resume your normal activities immediately after your procedure.   5. MEDICATIONS:   You may resume your normal medications today. To restart blood thinners, ask your doctor.  6. DRIVING    If you have received any sedatives by mouth today, you may not drive for 12 hours.    If you have received any sedation through your IV, you may not drive for 24 hrs.   7. SPECIAL INSTRUCTIONS:   No heat to the injection site for 24 hrs including, hot bath or shower, heating pad, moist heat, or hot tubs.    Use ice pack to injection site for any pain or discomfort.  Apply ice packs for 20 minute intervals as needed.    IF you have diabetes, be sure to monitor your blood sugar more closely. IF your injection contained steroids your blood sugar levels may become higher than normal.    If you are still having pain upon discharge:  Your pain may improve over the next 6-8 hours. The anesthetic (numbing medication) works immediately  up to 48 hours.     Please call the PAIN MANAGEMENT office at [phone] or ON CALL pager at [phone] if you experienced any:   -Weakness or loss of sensation  -Fever > 101.5  -Pain uncontrolled with oral medications   -Persistent nausea, vomiting, or diarrhea  -Redness or drainage from the injection sites, or any other worrisome concerns.   If physician on call was not reached or could not communicate with our office for any reason please go to the nearest emergency department.

## 2025-05-13 NOTE — DISCHARGE SUMMARY
Discharge Note  Short Stay      SUMMARY     Admit Date: 5/13/2025    Attending Physician: Laura Cline MD    Discharge Physician: Laura Cline MD      Discharge Date: 5/13/2025 3:36 PM    Procedure(s) (LRB):  BLOCK, NERVE BILATERAL L3, L4, L5 MEDIAL BRANCH  2 OF 2 (Bilateral)    Final Diagnosis:  1. Lumbar spondylosis    2. Chronic pain           Disposition: Home or self care    Patient Instructions:   Current Discharge Medication List        CONTINUE these medications which have NOT CHANGED    Details   apixaban (ELIQUIS) 5 mg Tab Take 5 mg by mouth 2 (two) times daily.      albuterol (ACCUNEB) 1.25 mg/3 mL Nebu TAKE 3MLS BY NUBULIZATION EVERY 6 HOURS AS NEEDED FOR WHEEZING  Qty: 75 mL, Refills: 0      albuterol (PROVENTIL/VENTOLIN HFA) 90 mcg/actuation inhaler Inhale 2 puffs into the lungs every 4 (four) hours as needed for Wheezing or Shortness of Breath. Rescue      amLODIPine (NORVASC) 5 MG tablet Take 1 tablet (5 mg total) by mouth once daily.  Qty: 30 tablet, Refills: 0    Comments: .      atorvastatin (LIPITOR) 40 MG tablet Take 40 mg by mouth once daily.      bumetanide (BUMEX) 2 MG tablet Take 2 mg by mouth 2 (two) times a day.      cetirizine (ZYRTEC) 10 MG tablet Take 10 mg by mouth once daily.      ciclopirox (PENLAC) 8 % Soln Apply topically nightly.  Qty: 6.6 mL, Refills: 11      finasteride (PROSCAR) 5 mg tablet Take 5 mg by mouth once daily.      fluticasone propionate (FLONASE) 50 mcg/actuation nasal spray 2 sprays by Each Nostril route once daily.      gabapentin (NEURONTIN) 600 MG tablet Take 1 tablet (600 mg total) by mouth 3 (three) times daily.  Qty: 90 tablet, Refills: 11      levothyroxine (SYNTHROID) 50 MCG tablet Take 50 mcg by mouth before breakfast.      methocarbamol (ROBAXIN) 500 MG Tab Take 500 mg by mouth 2 (two) times daily as needed (Muscle relaxant).      metoprolol succinate (TOPROL-XL) 25 MG 24 hr tablet Take 25 mg by mouth once daily.      montelukast (SINGULAIR) 10 mg  tablet Take 10 mg by mouth every evening.      spironolactone (ALDACTONE) 50 MG tablet Take 1 tablet (50 mg total) by mouth once daily.    Comments: .      tamsulosin (FLOMAX) 0.4 mg Cp24 Take 0.8 mg by mouth once daily.      TRELEGY ELLIPTA 200-62.5-25 mcg inhaler Inhale 1 puff into the lungs once daily.                 Discharge Diagnosis: Same as above  Condition on Discharge: Stable with no complications to procedure   Diet on Discharge: Same as before.  Activity: as per instruction sheet.  Discharge to: Home with a responsible adult.  Follow up: 2-4 weeks       Please call my office or pager at [phone] if experienced any weakness or loss of sensation, fever > 101.5, pain uncontrolled with oral medications, persistent nausea/vomiting/or diarrhea, redness or drainage from the incisions, or any other worrisome concerns. If physician on call was not reached or could not communicate with our office for any reason please go to the nearest emergency department     Terrance Velasco MD       [facility]

## 2025-05-14 ENCOUNTER — TELEPHONE (OUTPATIENT)
Dept: PAIN MEDICINE | Facility: CLINIC | Age: 65
End: 2025-05-14
Payer: MEDICARE

## 2025-05-14 DIAGNOSIS — M47.816 LUMBAR SPONDYLOSIS: Primary | ICD-10-CM

## 2025-05-14 NOTE — TELEPHONE ENCOUNTER
Medial Branch Block Diary   lumbar    2    Pre procedure pain level 10  Percentage of relief within 24 hours of procedure- 80%  Post procedure level 2  Current pain level:7  Any Improvements in ADL: bathing, dressing, eating, transferring, using toilet, and walking

## 2025-05-14 NOTE — TELEPHONE ENCOUNTER
----- Message from Sabina sent at 5/14/2025  3:36 PM CDT -----  Regarding: diary report  Name of Who is Calling: Robinson What is the request in detail: Patient is requesting a call back to give diary report. Can the clinic reply by [facility]: No What [facility]  [phone]

## 2025-05-15 ENCOUNTER — PATIENT MESSAGE (OUTPATIENT)
Dept: PAIN MEDICINE | Facility: CLINIC | Age: 65
End: 2025-05-15
Payer: MEDICARE

## 2025-05-15 DIAGNOSIS — M47.816 LUMBAR SPONDYLOSIS: Primary | ICD-10-CM

## 2025-06-03 ENCOUNTER — HOSPITAL ENCOUNTER (OUTPATIENT)
Facility: OTHER | Age: 65
Discharge: HOME | End: 2025-06-03
Attending: ANESTHESIOLOGY | Admitting: ANESTHESIOLOGY
Payer: MEDICARE

## 2025-06-03 VITALS
HEART RATE: 78 BPM | RESPIRATION RATE: 18 BRPM | SYSTOLIC BLOOD PRESSURE: 134 MMHG | DIASTOLIC BLOOD PRESSURE: 89 MMHG | OXYGEN SATURATION: 97 %

## 2025-06-03 DIAGNOSIS — M47.816 LUMBAR SPONDYLOSIS: Primary | ICD-10-CM

## 2025-06-03 DIAGNOSIS — G89.29 CHRONIC PAIN: ICD-10-CM

## 2025-06-03 PROCEDURE — 64635 DESTROY LUMB/SAC FACET JNT: CPT | Mod: 50,,, | Performed by: ANESTHESIOLOGY

## 2025-06-03 PROCEDURE — 64636 DESTROY L/S FACET JNT ADDL: CPT | Mod: 50,,, | Performed by: ANESTHESIOLOGY

## 2025-06-03 PROCEDURE — 63600175 PHARM REV CODE 636 W HCPCS: Performed by: ANESTHESIOLOGY

## 2025-06-03 PROCEDURE — 99152 MOD SED SAME PHYS/QHP 5/>YRS: CPT | Performed by: ANESTHESIOLOGY

## 2025-06-03 PROCEDURE — 99153 MOD SED SAME PHYS/QHP EA: CPT | Performed by: ANESTHESIOLOGY

## 2025-06-03 PROCEDURE — 64635 DESTROY LUMB/SAC FACET JNT: CPT | Performed by: ANESTHESIOLOGY

## 2025-06-03 PROCEDURE — 64636 DESTROY L/S FACET JNT ADDL: CPT | Performed by: ANESTHESIOLOGY

## 2025-06-03 RX ORDER — MIDAZOLAM HYDROCHLORIDE 1 MG/ML
INJECTION INTRAMUSCULAR; INTRAVENOUS
Status: DISCONTINUED | OUTPATIENT
Start: 2025-06-03 | End: 2025-06-03 | Stop reason: HOSPADM

## 2025-06-03 RX ORDER — FENTANYL CITRATE 50 UG/ML
INJECTION, SOLUTION INTRAMUSCULAR; INTRAVENOUS
Status: DISCONTINUED | OUTPATIENT
Start: 2025-06-03 | End: 2025-06-03 | Stop reason: HOSPADM

## 2025-06-03 RX ORDER — LIDOCAINE HYDROCHLORIDE 20 MG/ML
INJECTION, SOLUTION INFILTRATION; PERINEURAL
Status: DISCONTINUED | OUTPATIENT
Start: 2025-06-03 | End: 2025-06-03 | Stop reason: HOSPADM

## 2025-06-03 RX ORDER — SODIUM CHLORIDE 9 MG/ML
INJECTION, SOLUTION INTRAVENOUS CONTINUOUS
Status: DISCONTINUED | OUTPATIENT
Start: 2025-06-03 | End: 2025-06-03 | Stop reason: HOSPADM

## 2025-06-03 RX ORDER — DEXAMETHASONE SODIUM PHOSPHATE 10 MG/ML
INJECTION, SOLUTION INTRA-ARTICULAR; INTRALESIONAL; INTRAMUSCULAR; INTRAVENOUS; SOFT TISSUE
Status: DISCONTINUED | OUTPATIENT
Start: 2025-06-03 | End: 2025-06-03 | Stop reason: HOSPADM
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
## 2025-06-03 NOTE — DISCHARGE SUMMARY
Discharge Note  Short Stay      SUMMARY     Admit Date: 6/3/2025    Attending Physician: DARRICK GARSIA      Discharge Physician: DARRICK GARSIA      Discharge Date: 6/3/2025 10:06 AM    Procedure(s) (LRB):  RADIOFREQUENCY ABLATION BILATERAL L3, 4, 5 (Bilateral)    Final Diagnosis: Lumbar spondylosis [M47.816]    Disposition: Home or self care    Patient Instructions:   Current Discharge Medication List        CONTINUE these medications which have NOT CHANGED    Details   albuterol (ACCUNEB) 1.25 mg/3 mL Nebu TAKE 3MLS BY NUBULIZATION EVERY 6 HOURS AS NEEDED FOR WHEEZING  Qty: 75 mL, Refills: 0      albuterol (PROVENTIL/VENTOLIN HFA) 90 mcg/actuation inhaler Inhale 2 puffs into the lungs every 4 (four) hours as needed for Wheezing or Shortness of Breath. Rescue      amLODIPine (NORVASC) 5 MG tablet Take 1 tablet (5 mg total) by mouth once daily.  Qty: 30 tablet, Refills: 0    Comments: .      apixaban (ELIQUIS) 5 mg Tab Take 5 mg by mouth 2 (two) times daily.      atorvastatin (LIPITOR) 40 MG tablet Take 40 mg by mouth once daily.      bumetanide (BUMEX) 2 MG tablet Take 2 mg by mouth 2 (two) times a day.      cetirizine (ZYRTEC) 10 MG tablet Take 10 mg by mouth once daily.      ciclopirox (PENLAC) 8 % Soln Apply topically nightly.  Qty: 6.6 mL, Refills: 11      finasteride (PROSCAR) 5 mg tablet Take 5 mg by mouth once daily.      fluticasone propionate (FLONASE) 50 mcg/actuation nasal spray 2 sprays by Each Nostril route once daily.      gabapentin (NEURONTIN) 600 MG tablet Take 1 tablet (600 mg total) by mouth 3 (three) times daily.  Qty: 90 tablet, Refills: 11      levothyroxine (SYNTHROID) 50 MCG tablet Take 50 mcg by mouth before breakfast.      methocarbamol (ROBAXIN) 500 MG Tab Take 500 mg by mouth 2 (two) times daily as needed (Muscle relaxant).      metoprolol succinate (TOPROL-XL) 25 MG 24 hr tablet Take 25 mg by mouth once daily.      montelukast (SINGULAIR) 10 mg tablet Take 10 mg by mouth every  evening.      spironolactone (ALDACTONE) 50 MG tablet Take 1 tablet (50 mg total) by mouth once daily.    Comments: .      tamsulosin (FLOMAX) 0.4 mg Cp24 Take 0.8 mg by mouth once daily.      TRELEGY ELLIPTA 200-62.5-25 mcg inhaler Inhale 1 puff into the lungs once daily.                 Discharge Diagnosis: Lumbar spondylosis [M47.816]  Condition on Discharge: Stable with no complications to procedure   Diet on Discharge: Same as before.  Activity: as per instruction sheet.  Discharge to: Home with a responsible adult.  Follow up: 2-4 weeks       Please call my office or pager at [phone] if experienced any weakness or loss of sensation, fever > 101.5, pain uncontrolled with oral medications, persistent nausea/vomiting/or diarrhea, redness or drainage from the incisions, or any other worrisome concerns. If physician on call was not reached or could not communicate with our office for any reason please go to the nearest emergency department

## 2025-06-03 NOTE — OP NOTE
Therapeutic Lumbar Medial Branch Radiofrequency Ablation under Fluoroscopy     The procedure, risks, benefits, and options were discussed with the patient. There are no contraindications to the procedure. The patent expressed understanding and agreed to the procedure. Informed written consent was obtained prior to the start of the procedure and can be found in the patient's chart.        PATIENT NAME: Robinson Reardon Sr.   MRN: [id number]     DATE OF PROCEDURE: 06/03/2025     PROCEDURE:  Bilateral L3, L4, and L5 Lumbar Radiofrequency Ablation under Fluoroscopy    PRE-OP DIAGNOSIS: Lumbar spondylosis [M47.816] Lumbar spondylosis [M47.816]    POST-OP DIAGNOSIS: Same    PHYSICIAN: Laura Cline MD    ASSISTANTS: Meño Lambert     MEDICATIONS INJECTED:  Preservative-free Decadron 10mg with 9cc of Bupivicaine 0.25%    LOCAL ANESTHETIC INJECTED:   Xylocaine 2%    SEDATION: Versed 2mg and Fentanyl 100mcg                                                                                                                                                                                     Conscious sedation ordered by M.D. Patient re-evaluation prior to administration of conscious sedation. No changes noted in patient's status from initial evaluation. The patient's vital signs were monitored by RN and patient remained hemodynamically stable throughout the procedure.    Event Time In   Sedation Start 0937   Sedation End 1002       ESTIMATED BLOOD LOSS:  None    COMPLICATIONS:  None     INTERVAL HISTORY: Patient has clinical and imaging findings suggestive of facet mediated pain. Patients has completed 2 previous diagnostic medial branch blocks at specified levels with at least 80% relief for the expected duration of the local anesthetic utilized.    TECHNIQUE: Time-out was performed to identify the patient and procedure to be performed. With the patient laying in a prone position, the surgical area was prepped and draped in the  usual sterile fashion using ChloraPrep and fenestrated drape. The levels were determined under fluoroscopic guidance. Skin anesthesia was achieved by injecting Lidocaine 2% over the injection sites. A 18 gauge 10mm curved active tip needle was introduced to the anatomic local of the medial branch at each of the above levels using AP, lateral and/or contralateral oblique fluoroscopic imaging. Then sensory and motor testing was performed to confirm that the needle tips were in the correct location. After negative aspiration for blood or CSF was confirmed, 1 mL of the lidocaine 2% listed above was injected slowly at each site. This was followed by thermal lesioning at 80 degrees celsius for 90 seconds. Needles were then rotated 180 degrees, depth was checked with fluoroscopy, and a second ablation was performed without pain.That was followed by slowly injecting 1 mL of the medication mixture listed above at each site. The needles were removed and bleeding was nil. A sterile dressing was applied. No specimens collected. The patient tolerated the procedure well and did not have any procedure related motor deficit at the conclusion of the procedure.    The patient was monitored after the procedure in the recovery area. They were given post-procedure and discharge instructions to follow at home. The patient was discharged in a stable condition.    DARRICK GARSIA MD     I reviewed and edited the fellow's note. I conducted my own interview and physical examination. I agree with the findings. I was present and supervising all critical portions of the procedure.

## 2025-06-03 NOTE — DISCHARGE INSTRUCTIONS

## 2025-06-09 ENCOUNTER — TELEPHONE (OUTPATIENT)
Dept: PAIN MEDICINE | Facility: CLINIC | Age: 65
End: 2025-06-09
Payer: MEDICARE

## 2025-06-09 NOTE — TELEPHONE ENCOUNTER
----- Message from Med Assistant Hernandez sent at 6/9/2025 12:04 PM CDT -----  Name of Who is Calling:GRACIELA TELLEZ SR. [[id number]]  What is the request in detail: Pt states he is in more pain than before the procedure he had. Pt is requesting pain medication sent to [facility] #[id number] - [place] [id number] AT [facility] [address] [facility]. Please call if something is sent.   Can the clinic reply by [facility]: No  What Number to Call Back [facility] not [facility] [phone]   Name band;

## 2025-06-09 NOTE — TELEPHONE ENCOUNTER
Staff spoke with patient to assist with scheduling a follow up appointment to discuss pain after procedure

## 2025-06-10 ENCOUNTER — TELEPHONE (OUTPATIENT)
Dept: ENDOSCOPY | Facility: HOSPITAL | Age: 65
End: 2025-06-10
Payer: MEDICARE

## 2025-06-12 ENCOUNTER — OFFICE VISIT (OUTPATIENT)
Dept: PAIN MEDICINE | Facility: CLINIC | Age: 65
End: 2025-06-12
Payer: MEDICARE

## 2025-06-12 VITALS
RESPIRATION RATE: 19 BRPM | WEIGHT: 265 LBS | OXYGEN SATURATION: 97 % | SYSTOLIC BLOOD PRESSURE: 133 MMHG | BODY MASS INDEX: 37.1 KG/M2 | HEART RATE: 76 BPM | HEIGHT: 71 IN | DIASTOLIC BLOOD PRESSURE: 93 MMHG

## 2025-06-12 DIAGNOSIS — G89.4 CHRONIC PAIN SYNDROME: ICD-10-CM

## 2025-06-12 DIAGNOSIS — M47.816 FACET ARTHROPATHY, LUMBAR: ICD-10-CM

## 2025-06-12 DIAGNOSIS — M47.816 LUMBAR SPONDYLOSIS: ICD-10-CM

## 2025-06-12 DIAGNOSIS — M79.2 NEURITIS: Primary | ICD-10-CM

## 2025-06-12 DIAGNOSIS — M51.360 DEGENERATION OF INTERVERTEBRAL DISC OF LUMBAR REGION WITH DISCOGENIC BACK PAIN: ICD-10-CM

## 2025-06-12 PROCEDURE — 99999 PR PBB SHADOW E&M-EST. PATIENT-LVL V: CPT | Mod: PBBFAC,,,

## 2025-06-12 RX ORDER — TIZANIDINE 4 MG/1
4 TABLET ORAL EVERY 6 HOURS PRN
COMMUNITY
Start: 2025-05-21

## 2025-06-12 RX ORDER — LIDOCAINE 50 MG/G
1 PATCH TOPICAL DAILY
Qty: 30 PATCH | Refills: 0 | Status: SHIPPED | OUTPATIENT
Start: 2025-06-12

## 2025-06-12 RX ORDER — METHYLPREDNISOLONE 4 MG/1
TABLET ORAL
Qty: 21 EACH | Refills: 0 | Status: SHIPPED | OUTPATIENT
Start: 2025-06-12 | End: 2025-07-03

## 2025-06-12 NOTE — PATIENT INSTRUCTIONS
Take 1/2 tablet of Tizanidine 4 mg 2-3 times per day. To help with muscle spasms    Stop Methocarbamol if it is making you drowsy, do not take Tizanidine and Methocarbamol together
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
## 2025-06-18 ENCOUNTER — OFFICE VISIT (OUTPATIENT)
Dept: SLEEP MEDICINE | Facility: CLINIC | Age: 65
End: 2025-06-18
Payer: MEDICARE

## 2025-06-18 VITALS
BODY MASS INDEX: 37.04 KG/M2 | HEIGHT: 71 IN | HEART RATE: 71 BPM | DIASTOLIC BLOOD PRESSURE: 90 MMHG | WEIGHT: 264.56 LBS | SYSTOLIC BLOOD PRESSURE: 140 MMHG

## 2025-06-18 DIAGNOSIS — G47.10 HYPERSOMNOLENCE: ICD-10-CM

## 2025-06-18 DIAGNOSIS — F51.09 OTHER INSOMNIA NOT DUE TO A SUBSTANCE OR KNOWN PHYSIOLOGICAL CONDITION: ICD-10-CM

## 2025-06-18 DIAGNOSIS — R06.83 SNORING: ICD-10-CM

## 2025-06-18 DIAGNOSIS — R35.1 NOCTURIA: ICD-10-CM

## 2025-06-18 DIAGNOSIS — G47.33 OSA (OBSTRUCTIVE SLEEP APNEA): Primary | ICD-10-CM

## 2025-06-18 PROCEDURE — 3077F SYST BP >= 140 MM HG: CPT | Mod: CPTII,S$GLB,, | Performed by: PHYSICIAN ASSISTANT

## 2025-06-18 PROCEDURE — 99204 OFFICE O/P NEW MOD 45 MIN: CPT | Mod: S$GLB,,, | Performed by: PHYSICIAN ASSISTANT

## 2025-06-18 PROCEDURE — 4010F ACE/ARB THERAPY RXD/TAKEN: CPT | Mod: CPTII,S$GLB,, | Performed by: PHYSICIAN ASSISTANT

## 2025-06-18 PROCEDURE — 1160F RVW MEDS BY RX/DR IN RCRD: CPT | Mod: CPTII,S$GLB,, | Performed by: PHYSICIAN ASSISTANT

## 2025-06-18 PROCEDURE — 3288F FALL RISK ASSESSMENT DOCD: CPT | Mod: CPTII,S$GLB,, | Performed by: PHYSICIAN ASSISTANT

## 2025-06-18 PROCEDURE — 3080F DIAST BP >= 90 MM HG: CPT | Mod: CPTII,S$GLB,, | Performed by: PHYSICIAN ASSISTANT

## 2025-06-18 PROCEDURE — 1159F MED LIST DOCD IN RCRD: CPT | Mod: CPTII,S$GLB,, | Performed by: PHYSICIAN ASSISTANT

## 2025-06-18 PROCEDURE — 3008F BODY MASS INDEX DOCD: CPT | Mod: CPTII,S$GLB,, | Performed by: PHYSICIAN ASSISTANT

## 2025-06-18 PROCEDURE — 1101F PT FALLS ASSESS-DOCD LE1/YR: CPT | Mod: CPTII,S$GLB,, | Performed by: PHYSICIAN ASSISTANT

## 2025-06-18 PROCEDURE — 99999 PR PBB SHADOW E&M-EST. PATIENT-LVL IV: CPT | Mod: PBBFAC,,, | Performed by: PHYSICIAN ASSISTANT

## 2025-06-18 PROCEDURE — 1125F AMNT PAIN NOTED PAIN PRSNT: CPT | Mod: CPTII,S$GLB,, | Performed by: PHYSICIAN ASSISTANT

## 2025-06-18 NOTE — PROGRESS NOTES
Referred by Carol Dumont MD     NEW PATIENT VISIT    Robinson Reardon Sr.  is a pleasant 64 y.o. male  with PMH significant for HTN, HLD, hx CVA, COPD, hx PE, CKD3, DDD, chronic low back pain, BMI 36+, JEFFERSON  who presents for JEFFERSON evaluation following referral from PCP      Reports dx JEFFERSON in [place] circa 15-20 years ago following c/o snoring, witnessed apneas, poor disrupted and un-refreshing sleep, and excessive daytime sleepiness and fatigue. States he moved to [place] for a few years and CPAP machine got broken in the move. States he has been without his CPAP machine for about 10+ years now. States currently he is having much difficulty sleeping at night and being excessively sleepy during the day. He attributes a lot of his current sx to significant chronic back pain. States the pain keeps him from sleeping well. States he had to give up sleeping in his bed entirely due to pain and now strictly sleeps on his recliner as this is the only position that provides minimal relief of back pain. States he has been asking his pain management specialist to take tramadol, as he feels his current regimen of gabapentin, tizanidine and lidoderm patches is not adequately controlling his sx. Additionally, he feels the gabapentin and tizanidine are contributing to his increase in daytime fatigue. States his CPAP recommended re-assessment and management of JEFFERSON, which is why he presents today, but he is concerned about logistics of CPAP as he does not have a night stand or electrical outlet near his recliner. He is open to PSG at this time. Denies parasomnia, dream enactment.       SLEEP SCHEDULE   Environment    Bed Time 10:30PM-MN   Sleep Latency About an hour   Arousals 3   Nocturia 5   Back to sleep 1hr   Wake time 4-5AM   Naps Naps 2 x daily   Work            6/12/2025     5:43 PM   Sleep Clinic ROS    Difficulty breathing through the nose?  Sometimes   Sore throat or dry mouth in the morning? No   Irregular or very fast heart  "beat?  No   Shortness of breath?  Yes   Acid reflux? No   Body aches and pains?  Yes   Morning headaches? No   Dizziness? No   Mood changes?  No   Do you exercise?  Yes   Do you feel like moving your legs a lot?  Yes       Past Medical History:   Diagnosis Date    Asthma     CHF (congestive heart failure)     COPD (chronic obstructive pulmonary disease)     History of CVA with residual deficit 2/17/2022    HLD (hyperlipidemia) 2/17/2022    Hyperlipidemia     Hypertension     Hyperthyroidism     Osteoarthritis of hip 2/17/2022    Sleep apnea 2017    Stroke     Unintentional weight loss 2/17/2022     Problem List[1]  Current Medications[2]       Vitals:    06/18/25 0949   BP: (!) 140/90   Pulse: 71   Weight: 120 kg (264 lb 8.8 oz)   Height: 5' 11" (1.803 m)     Physical Exam:    GEN:   Well-appearing  Psych:  Appropriate affect, demonstrates insight  SKIN:  No rash on the face or bridge of the nose      LABS:   Lab Results   Component Value Date    HGB 12.5 (L) 02/05/2025    CO2 27 01/26/2025         RECORDS REVIEWED:    No access to previous sleep study    ASSESSMENT        6/12/2025     5:35 PM   EPWORTH SLEEPINESS SCALE   Sitting and reading 3   Watching TV 3   Sitting, inactive in a public place (e.g. a theatre or a meeting) 3   As a passenger in a car for an hour without a break 3   Lying down to rest in the afternoon when circumstances permit 3   Sitting and talking to someone 2   Sitting quietly after a lunch without alcohol 3   In a car, while stopped for a few minutes in traffic 2   Total score 22        Patient-reported       PROBLEM DESCRIPTION/ Sx on Presentation  STATUS   Dx JEFFERSON   + snoring, + witnessed apneas  + wakes feeling un-refreshed  Dx JEFFERSON many years ago in [place] (no access to study)  Initially tried CPAP, unable to tolerate it  No longer has access to PAP  New   Daytime Sx   + sleepiness when inactive   Naps 2 x daily  ESS 22/24 on intake  New   Insomnia   Trouble falling asleep: " 1hr  Arousals:         3  Hard to get back to sleep?: 1hr    Prior pertinent medications:  Current pertinent medications:   Gabapentin 600mg TID, tizanidine 2-4mg TID, lidoderm patches  New   Nocturia   x 5 per sleep period  New   Other issues:       PLAN      -recommend sleep testing to re-establish dx of JEFFERSON and re-qualify for CPAP trial  -PSG ordered  -discussed trial therapy if JEFFERSON present and the patient is open to a re-trial of CPAP therapy  -discussed JEFFERSON and PAP with patient in detail, including possible complications of untreated JEFFERSON like heart attack/stroke  -advised on strict driving precautions; advised never to drive drowsy     Advised on plan of care. Answered all patient questions. Patient verbalized understanding and voiced agreement with plan of care.     RTC if dx of JEFFERSON made and CPAP ordered, will need follow up 31-90 days after receiving machine for compliance       The patient was given open opportunity to ask questions and/or express concerns about treatment plan. All questions/concerns were discussed.     Two patient identifiers used prior to evaluation.                  [1]   Patient Active Problem List  Diagnosis    Low back pain    Calculus of gallbladder with cholecystitis with biliary obstruction    Liver lesions    Osteoarthritis of hip    Sleep apnea    HLD (hyperlipidemia)    COPD (chronic obstructive pulmonary disease)    History of CVA with residual deficit    Unintentional weight loss    Acute pulmonary embolism without acute cor pulmonale    Primary hypertension    Asthma    Class 2 obesity due to excess calories without serious comorbidity with body mass index (BMI) of 37.0 to 37.9 in adult    Chronic bilateral low back pain without sciatica    Right hip pain    Impaired functional mobility, balance, gait, and endurance    Influenza A    Simple chronic bronchitis    Community acquired pneumonia    CKD (chronic kidney disease) stage 3, GFR 30-59 ml/min    Elevated troponin     Chronic anticoagulation    Chest pain    Lumbar spondylosis   [2]   Current Outpatient Medications:     albuterol (ACCUNEB) 1.25 mg/3 mL Nebu, TAKE 3MLS BY NUBULIZATION EVERY 6 HOURS AS NEEDED FOR WHEEZING, Disp: 75 mL, Rfl: 0    albuterol (PROVENTIL/VENTOLIN HFA) 90 mcg/actuation inhaler, Inhale 2 puffs into the lungs every 4 (four) hours as needed for Wheezing or Shortness of Breath. Rescue, Disp: , Rfl:     amLODIPine (NORVASC) 5 MG tablet, Take 1 tablet (5 mg total) by mouth once daily., Disp: 30 tablet, Rfl: 0    apixaban (ELIQUIS) 5 mg Tab, Take 5 mg by mouth 2 (two) times daily., Disp: , Rfl:     atorvastatin (LIPITOR) 40 MG tablet, Take 40 mg by mouth once daily., Disp: , Rfl:     bumetanide (BUMEX) 2 MG tablet, Take 2 mg by mouth 2 (two) times a day., Disp: , Rfl:     cetirizine (ZYRTEC) 10 MG tablet, Take 10 mg by mouth once daily., Disp: , Rfl:     ciclopirox (PENLAC) 8 % Soln, Apply topically nightly., Disp: 6.6 mL, Rfl: 11    finasteride (PROSCAR) 5 mg tablet, Take 5 mg by mouth once daily., Disp: , Rfl:     fluticasone propionate (FLONASE) 50 mcg/actuation nasal spray, 2 sprays by Each Nostril route once daily., Disp: , Rfl:     gabapentin (NEURONTIN) 600 MG tablet, Take 1 tablet (600 mg total) by mouth 3 (three) times daily., Disp: 90 tablet, Rfl: 11    levothyroxine (SYNTHROID) 50 MCG tablet, Take 50 mcg by mouth before breakfast., Disp: , Rfl:     LIDOcaine (LIDODERM) 5 %, Place 1 patch onto the skin once daily. Remove & Discard patch within 12 hours or as directed by MD, Disp: 30 patch, Rfl: 0    methylPREDNISolone (MEDROL DOSEPACK) 4 mg tablet, use as directed, Disp: 21 each, Rfl: 0    metoprolol succinate (TOPROL-XL) 25 MG 24 hr tablet, Take 25 mg by mouth once daily., Disp: , Rfl:     montelukast (SINGULAIR) 10 mg tablet, Take 10 mg by mouth every evening., Disp: , Rfl:     spironolactone (ALDACTONE) 50 MG tablet, Take 1 tablet (50 mg total) by mouth once daily. (Patient not taking: Reported on  6/12/2025), Disp: , Rfl:     tamsulosin (FLOMAX) 0.4 mg Cp24, Take 0.8 mg by mouth once daily., Disp: , Rfl:     tiZANidine (ZANAFLEX) 4 MG tablet, Take 4 mg by mouth every 6 (six) hours as needed., Disp: , Rfl:     TRELEGY ELLIPTA 200-62.5-25 mcg inhaler, Inhale 1 puff into the lungs once daily., Disp: , Rfl:

## 2025-06-24 ENCOUNTER — TELEPHONE (OUTPATIENT)
Dept: PAIN MEDICINE | Facility: CLINIC | Age: 65
End: 2025-06-24
Payer: MEDICARE

## 2025-06-24 RX ORDER — METHOCARBAMOL 500 MG/1
500 TABLET, FILM COATED ORAL 2 TIMES DAILY PRN
Status: CANCELLED | OUTPATIENT
Start: 2025-06-24

## 2025-06-24 NOTE — TELEPHONE ENCOUNTER
Copied from CRM #[id number]. Topic: Medications - Medication Refill  >> Jun 24, 2025 10:40 AM Kwame wrote:  Type: RX Refill Request    Who Called: patient    Have you contacted your pharmacy:    Refill or New Rx:methchocarbamol.... spelled by patient    RX Name and Strength:    How is the patient currently taking it? (ex. 1XDay):    Is this a 30 day or 90 day RX:    Preferred Pharmacy with phone number:    Local or Mail Order:local [facility] on [facility]    Ordering Provider:Son    Would the patient rather a call back or a response via [facility]? call    Best Call Back Number    Additional Information:

## 2025-06-24 NOTE — TELEPHONE ENCOUNTER
Copied from CRM #[id number]. Topic: Medications - Medication Refill  >> Jun 24, 2025 10:40 AM Kwame wrote:  Type: RX Refill Request    Who Called: patient    Have you contacted your pharmacy:    Refill or New Rx:methchocarbamol.... spelled by patient    RX Name and Strength:    How is the patient currently taking it? (ex. 1XDay):    Is this a 30 day or 90 day RX:    Preferred Pharmacy with phone number:    Local or Mail Order:local [facility] on [facility]    Ordering Provider:Son    Would the patient rather a call back or a response via [facility]? call    Best Call Back Number    Additional Information:

## 2025-06-24 NOTE — TELEPHONE ENCOUNTER
Copied from CRM #[id number]. Topic: Medications - Medication Refill  >> Jun 24, 2025 10:40 AM Kwame wrote:  Type: RX Refill Request    Who Called: patient    Have you contacted your pharmacy:    Refill or New Rx:methchocarbamol.... spelled by patient    RX Name and Strength:    How is the patient currently taking it? (ex. 1XDay):    Is this a 30 day or 90 day RX:    Preferred Pharmacy with phone number:    Local or Mail Order:local [facility] on [facility]    Ordering Provider:Son    Would the patient rather a call back or a response via [facility]? call    Best Call Back Number    Additional Information:

## 2025-06-24 NOTE — TELEPHONE ENCOUNTER
Staff contacted pt to inform him that Susan's plan is to stop methocarbamol and continue gabapentin.

## 2025-07-03 ENCOUNTER — NURSE TRIAGE (OUTPATIENT)
Dept: ADMINISTRATIVE | Facility: CLINIC | Age: 65
End: 2025-07-03
Payer: MEDICARE

## 2025-07-03 NOTE — TELEPHONE ENCOUNTER
Bilateral leg swelling that is worse on the right with increased SOB w/ ambulation. Advised, per protocol and verbalizes understanding.     Reason for Disposition   [1] Difficulty breathing with exertion (e.g., walking) and [2] NEW or getting WORSE    Additional Information   Negative: SEVERE difficulty breathing (e.g., struggling for each breath, speaks in single words)   Negative: Looks like a broken bone or dislocated joint (e.g., crooked or deformed)   Negative: Sounds like a life-threatening emergency to the triager   Negative: Difficulty breathing at rest   Negative: Entire foot is cool or blue in comparison to other side   Negative: [1] Can't walk or can barely walk AND [2] new-onset    Protocols used: Leg Swelling and Edema-A-AH

## 2025-07-09 ENCOUNTER — TELEPHONE (OUTPATIENT)
Dept: SLEEP MEDICINE | Facility: OTHER | Age: 65
End: 2025-07-09
Payer: MEDICARE

## 2025-07-10 ENCOUNTER — TELEPHONE (OUTPATIENT)
Dept: SLEEP MEDICINE | Facility: OTHER | Age: 65
End: 2025-07-10
Payer: MEDICARE

## 2025-07-11 ENCOUNTER — HOSPITAL ENCOUNTER (OUTPATIENT)
Dept: SLEEP MEDICINE | Facility: OTHER | Age: 65
Discharge: HOME | End: 2025-07-11
Attending: PHYSICIAN ASSISTANT
Payer: MEDICARE

## 2025-07-11 ENCOUNTER — TELEPHONE (OUTPATIENT)
Dept: PAIN MEDICINE | Facility: CLINIC | Age: 65
End: 2025-07-11
Payer: MEDICARE

## 2025-07-11 DIAGNOSIS — G47.10 HYPERSOMNOLENCE: ICD-10-CM

## 2025-07-11 DIAGNOSIS — F51.09 OTHER INSOMNIA NOT DUE TO A SUBSTANCE OR KNOWN PHYSIOLOGICAL CONDITION: ICD-10-CM

## 2025-07-11 DIAGNOSIS — G47.33 OSA (OBSTRUCTIVE SLEEP APNEA): ICD-10-CM

## 2025-07-11 DIAGNOSIS — R06.83 SNORING: ICD-10-CM

## 2025-07-11 DIAGNOSIS — R35.1 NOCTURIA: ICD-10-CM

## 2025-07-11 PROCEDURE — 95810 POLYSOM 6/> YRS 4/> PARAM: CPT

## 2025-07-11 PROCEDURE — 95810 POLYSOM 6/> YRS 4/> PARAM: CPT | Mod: 26,,, | Performed by: INTERNAL MEDICINE

## 2025-07-11 NOTE — TELEPHONE ENCOUNTER
"Explained that Dr. Cline's surgical schedule will overlap appointment. Call ended.     "  You  Robinson Reardon Sr.Just now (3:37 PM)     GS  Mr. Reardon,  I left a voicemail earlier today. Dr. Cline's surgical schedule is going to overlap with your appointment on Friday, July 18, 2025. Would you like to be seen the following week? Please let me know and I will help you reschedule.  Thank you,  TOY Tejeda LPN    This IQcardt message has not been read.   "  "

## 2025-07-12 PROBLEM — G47.33 OSA (OBSTRUCTIVE SLEEP APNEA): Status: ACTIVE | Noted: 2022-02-17

## 2025-07-12 NOTE — PROGRESS NOTES
Robinson Reardon Sr. to [facility] for an overnight sleep study on 07/11/2025.  Pt. education, setup and cpap explanation given prior to testing.  Disposable leads and sensors used where applicable.  Diagnostic study completed.  Post test followup information given in AM.

## 2025-07-14 ENCOUNTER — TELEPHONE (OUTPATIENT)
Dept: PAIN MEDICINE | Facility: CLINIC | Age: 65
End: 2025-07-14
Payer: MEDICARE

## 2025-07-14 NOTE — TELEPHONE ENCOUNTER
Copied from CRM #[id number]. Topic: Appointments - Appointment Rescheduling  >> Jul 14, 2025 11:41 AM Luis Enrique wrote:  Type: Patient Call Back    Who called: Patient     What is the request in detail: Pt is requesting a call back to reschedule his appt as advised for the following week. I attempted to schedule but did \not see anything until Aug.      Would the patient rather a call back or a response via [facility]?  Call     Best call back number: [phone]     Additional Information:

## 2025-07-17 ENCOUNTER — TELEPHONE (OUTPATIENT)
Dept: PAIN MEDICINE | Facility: CLINIC | Age: 65
End: 2025-07-17
Payer: MEDICARE

## 2025-07-21 ENCOUNTER — TELEPHONE (OUTPATIENT)
Dept: ENDOSCOPY | Facility: HOSPITAL | Age: 65
End: 2025-07-21
Payer: MEDICARE

## 2025-07-21 ENCOUNTER — PATIENT MESSAGE (OUTPATIENT)
Dept: ENDOSCOPY | Facility: HOSPITAL | Age: 65
End: 2025-07-21
Payer: MEDICARE

## 2025-07-21 NOTE — TELEPHONE ENCOUNTER
Patient has spoken with someone in endoscopy scheduling department on today and rescheduled colonoscopy.

## 2025-07-21 NOTE — TELEPHONE ENCOUNTER
Source   Robinson Reardon Sr. (Patient)    Subject   Robinson Reardon Sr. (Patient)    Topic   General Inquiry - Patient Advice        Communication   Name Of Caller: Robinson                        Provider Name:                        Does patient feel the need to be seen today? no                        Relationship to the Pt?: patient                        Contact Preference?: [phone]                        What is the nature of the call?:  Patient is requesting to speak with someone in the office in regards to some question he has about his prep instructions for his colonoscopy that is scheduled for Tuesday 7-.

## 2025-07-23 ENCOUNTER — TELEPHONE (OUTPATIENT)
Dept: ENDOSCOPY | Facility: HOSPITAL | Age: 65
End: 2025-07-23
Payer: MEDICARE

## 2025-07-23 ENCOUNTER — TELEPHONE (OUTPATIENT)
Dept: SLEEP MEDICINE | Facility: CLINIC | Age: 65
End: 2025-07-23
Payer: MEDICARE

## 2025-07-23 NOTE — TELEPHONE ENCOUNTER
----- Message from Med Assistant [place] sent at 2025 11:17 AM CDT -----  Regardin/4 BT  The patient is currently under an external PCP, antonia Schmid. Is patient okay to hold blood thinner Eliquis (apixaban) for their upcoming scheduled Colonoscopy on 25.         External provider information:    Physician name: Dr. Carol Dumont   Facility/Location: [facility] [place]  Phone number: [phone]

## 2025-07-23 NOTE — TELEPHONE ENCOUNTER
Department of Endoscopy      Dear Dr. Mohamud Walsh,    Your patient, Robinson Reardon Sr. 1960 is scheduled for Colonoscopy on 9/4/25  and our records indicate they are taking Eliquis (apixaban).    Please indicate if and when the patient can safely stop their medication prior to the procedure by completing one of the following:    Do not discontinue medication: _____    Medication can be discontinued _____ days prior to the procedure.    Please take into consideration that therapeutic maneuvers may be performed during the procedure that requires delay in restarting anticoagulation therapy.      Signature: ______________________________________________ Date:__________________      Please sign and date this letter and fax it to [phone]. If you have any questions or concerns, please call us at [phone] Monday-Friday, 8:00 am-3:00 pm.    Thank you for your prompt response,    [facility]       Medication                                                                Hold Time                                                                                                                                                        ANTIPLATELETS   Persantine (dipyridamole) 2 days   Aggrenox (ASA/dipyridamole) 2 days     Pletal (cilostazol) 2 days     Plavix (clopidogrel) 5 days     Effient (prasugrel) 5 days     Ticlid (ticlidopine) 10 days     Brilinta (ticagrelor) 3 days     Zontivity (vorapaxar) 5 days   ANTICOAGULANTS   Coumadin (warfarin) 5 days   Lovenox (enoxaparin)  -last dose administered to be 50% of total daily dose 24 hours   Fragmin (dalteparin)   -last dose administered to be 50% of total daily dose 24 hours   Arixtra (fondaparinux) 2 days     Xarelto (rivaroxaban)   2 days     Eliquis (apixaban)   2 days     Savaysa (edoxaban)   2 days     Pradaxa (dabigatran)   2 days     Iprivask (desirudin)   10 hrs

## 2025-07-23 NOTE — TELEPHONE ENCOUNTER
Clearance request faxed to Dr. Mohamud Walsh at [facility]. Fax number [phone] to hold Eliquis (apixaban) prior to Colonoscopy.

## 2025-07-23 NOTE — TELEPHONE ENCOUNTER
Copied from CRM #[id number]. Topic: General Inquiry - Test Results  >> Jul 23, 2025  2:57 PM Genet wrote:  Type : Patient Call    Who Called : Patient       Does the patient know what this is regarding?: Patient is calling requesting a call back to discuss his sleep study results if they are in. Please Advise.       Would the patient rather a call back or a response via [facility]? Call      Best Call Back Number: [phone]

## 2025-07-24 ENCOUNTER — PATIENT MESSAGE (OUTPATIENT)
Dept: SLEEP MEDICINE | Facility: CLINIC | Age: 65
End: 2025-07-24
Payer: MEDICARE

## 2025-07-24 NOTE — PROCEDURES
"[facility]    Polysomnography Interpretation Report    Patient Name:  Robinson Reardon Sr.  MRN#:  [id number]  :  1960  Study Date:  2025  Referring Provider:  GEOFF BALL    Indications for Polysomnography:  The patient is a 64 year old Male who is 5' 11" and weighs 264.0 lbs.  His BMI equals 37.0.  [place] was - and Neck Circumference was -.  A full night polysomnogram was performed to evaluate for -.    Polysomnogram Data  A full night polysomnogram recorded the standard physiologic parameters including EEG, EOG, EMG, EKG, nasal and oral airflow.  Respiratory parameters of chest and abdominal movements were recorded with (RIP) Respiratory Inductance Plethsmography.  Oxygen saturation was recorded by pulse oximetry.    Sleep Architecture  The total recording time of the polysomnogram was 468.4 minutes.  The total sleep time was 394.0 minutes.  The patient spent 6.6% of total sleep time in Stage N1, 64.2% in Stage N2, 0.0% in Stages N3, and 29.2% in REM.  Sleep latency was 14.5 minutes.  REM latency was 56.5 minutes.  Sleep Efficiency was 84.1%.  Wake after sleep onset was 59.5 minutes.    Respiratory Events  The polysomnogram revealed a presence of - obstructive, - central, and - mixed apneas resulting in a Total Apnea index of - events per hour.  There were 14 hypopneas resulting in a Total Hypopnea index of 2.1 events per hour.  The combined Apnea/Hypopnea index was 2.1 events per hour.  There were a total of 11 RERA events resulting in a Respiratory Disturbance Index (RDI) of 3.8 events per hour.     Mean oxygen saturation was 91.4%.  The lowest oxygen saturation during sleep was 87.0%.  Time spent <=88% oxygen saturation was 4.0 minutes (0.9%).    End Tidal CO2 during sleep ranged from - to - mmHg. End Tidal CO2 was greater than 50 mmHg for - minutes and greater than 55 mmHg for - minutes.  Transcutaneous CO2 during sleep ranged from - to - mmHg. Transcutaneous CO2 was " "greater than 50 mmHg for - minutes and greater than 55 mmHg for - minutes.    Limb Activity  There were - limb movements recorded.  Of this total, - were classified as PLMs.  Of the PLMs, - were associated with arousals.  The Limb Movement index was - per hour while the PLM index was - per hour and PLM with arousals index was - per hour.    Cardiac:  single lead EKG revealed normal sinus rhythm with occasional PVCs    Oxygenation:  No significant hypoxemia was observed     Impression:  -The mild sleep-disordered breathing in this study does not meet criteria for obstructive sleep apnea.    Recommendations:    -the patient has follow up with Sleep Medicine          Fadi Caballero MD    (This Sleep Study was interpreted by a Board Certified Sleep Specialist who conducted an epoch-by-epoch review of the entire raw data recording.)  (The indication for this sleep study was reviewed and deemed appropriate by AASM Practice Parameters or other reasons by a Board Certified Sleep Specialist.)    [facility]     Diagnostic PSG Report     Patient Name: Robinson Reardon Sr. Study Date: 7/11/2025   YOB: 1960 MRN #: [id number]   Age: 64 year WILLIAM #: -   Sex: Male Referring Provider: GEOFF BALL   Height: 5' 11" Recording Tech: Denisha Fowlernet RRT RPSGT   Weight: 264.0 lbs Scoring Tech: Jose Vazquez RRT RPSGT   BMI: 37.0 Interpreting Physician: -   ESS: - Neck Circumference: -      Study Overview     Lights Off: 09:33:34 PM   Count Index   Lights On: 05:22:01 AM Awakenings: 21 3.2   Time in Bed: 468.4 min. Arousals: 36 5.5   Total Sleep Time: 394.0 min. Apneas & Hypopneas: 14 2.1    Sleep Efficiency: 84.1% Limb Movements: - -   Sleep Latency: 14.5 min. Snores: - -   Wake After Sleep Onset: 59.5 min. Desaturations: 16 2.4    REM Latency from Sleep Onset: 56.5 min. Minimum SpO2 TST: 87.0%        Sleep Architecture                                                                                         "                                   % of Time in Bed     Stages Time (mins) % Sleep Time   Wake 74.5     Stage N1 26.0 6.6%   Stage N2 253.0 64.2%   Stage N3 0.0 0.0%   .0 29.2%         Arousal Summary       NREM REM Sleep Index   Respiratory Arousals 5 2 7 1.1   PLM Arousals - - - -   Isolated Limb Movement Arousals - - - -   Spontaneous Arousals 27 2 29 4.4   Total 32 4 36 5.5         Limb Movement Summary       Count Index   Isolated Limb Movements - -   Periodic Limb Movements (PLMs) - -   Total Limb Movements - -          Respiratory Summary       By Sleep Stage By Body Position Total    NREM REM Supine Non-Supine    Time (min) 279.0 115.0 43.5 350.5 394.0                 Obstructive Apnea - - - - -   Mixed Apnea - - - - -   Central Apnea - - - - -   Central Apnea Index - - - - -   Total Apneas - - - - -   Total Apnea Index - - - - -                 Hypopnea 6 8 2 12 14   Hypopnea Index 1.3 4.2 2.8 2.1 2.1                 Apnea & Hypopnea 6 8 2 12 14   Apnea & Hypopnea Index 1.3 4.2 2.8 2.1 2.1                 RERAs 9 2 1 10 11   RERA Index 1.9 1.0 1.4 1.7 1.7                 RDI 3.2 5.2 4.1 3.8 3.8      Scoring Criteria: Hypopneas scored at 4% desaturation criteria.     Respiratory Event Durations       Apnea Hypopnea    NREM REM NREM REM   Average (seconds) - - 14.2 16.2   Maximum (seconds) - - 17.8 20.8         Oxygen Saturation Summary       Wake NREM REM TST TIB   Average SpO2 92.3% 91.4% 90.8% 91.2% 91.4%   Minimum SpO2 86.0% 88.0% 87.0% 87.0% 86.0%   Maximum SpO2 97.0% 96.0% 95.0% 96.0% 97.0%         Oxygen Saturation Distribution     Range (%) Time in range (min) Time in range (%)   90.0 - 100.0 348.1 77.3%   80.0 - 90.0 101.6 22.6%   70.0 - 80.0 - -   60.0 - 70.0 - -   50.0 - 60.0 - -   0.0 - 50.0 - -   Time Spent <=88% SpO2     Range (%) Time in range (min) Time in range (%)   0.0 - 88.0 4.0 0.9%              Count Index   Desaturations 16 2.4           Cardiac Summary       Wake NREM REM Sleep  Total   Average Pulse Rate (BPM) 65.2 60.6 63.5 61.5 61.9   Minimum Pulse Rate (BPM) 49.0 46.0 50.0 46.0 46.0   Maximum Pulse Rate (BPM) 110.0 79.0 75.0 79.0 110.0      Pulse Rate Distribution     Range (bpm) Time in range (min) Time in range (%)   0.0 - 40.0 - -   40.0 - 60.0 142.8 31.7%   60.0 - 80.0 302.9 67.3%   80.0 - 100.0 4.4 1.0%   100.0 - 120.0 0.3 0.1%   120.0 - 140.0 - -   140.0 - 200.0 - -      EtCO2 Summary     Stage Min (mmHg) Average (mmHg) Max (mmHg)   Wake - - -   NREM(1+2+3) - - -   REM - - -      Range (mmHg) Time in range (min) Time in range (%)   20.0 - 40.0 - -   40.0 - 50.0 - -   50.0 - 55.0 - -   55.0 - 100.0 - -      TcCO2 Summary     Stage Min (mmHg) Average (mmHg) Max (mmHg)   Wake - - -   NREM(1+2+3) - - -   REM - - -      Range (mmHg) Time in range (min) Time in range (%)   20.0 - 40.0 - -   40.0 - 50.0 - -   50.0 - 55.0 - -   55.0 - 100.0 - -   Excluded data <20.0 & >65.0 468.5 100.0%      Comments     -

## 2025-07-25 ENCOUNTER — OFFICE VISIT (OUTPATIENT)
Dept: PAIN MEDICINE | Facility: CLINIC | Age: 65
End: 2025-07-25
Payer: MEDICARE

## 2025-07-25 ENCOUNTER — HOSPITAL ENCOUNTER (EMERGENCY)
Facility: OTHER | Age: 65
Discharge: HOME | End: 2025-07-25
Payer: MEDICARE

## 2025-07-25 VITALS
HEART RATE: 87 BPM | HEIGHT: 71 IN | BODY MASS INDEX: 35 KG/M2 | SYSTOLIC BLOOD PRESSURE: 146 MMHG | RESPIRATION RATE: 31 BRPM | OXYGEN SATURATION: 92 % | TEMPERATURE: 98 F | DIASTOLIC BLOOD PRESSURE: 94 MMHG | WEIGHT: 250 LBS

## 2025-07-25 VITALS
SYSTOLIC BLOOD PRESSURE: 171 MMHG | WEIGHT: 263.69 LBS | TEMPERATURE: 97 F | BODY MASS INDEX: 36.92 KG/M2 | HEART RATE: 76 BPM | OXYGEN SATURATION: 97 % | HEIGHT: 71 IN | DIASTOLIC BLOOD PRESSURE: 105 MMHG | RESPIRATION RATE: 18 BRPM

## 2025-07-25 DIAGNOSIS — R07.9 CHEST PAIN: ICD-10-CM

## 2025-07-25 DIAGNOSIS — M47.816 LUMBAR SPONDYLOSIS: ICD-10-CM

## 2025-07-25 DIAGNOSIS — R06.02 SHORTNESS OF BREATH: ICD-10-CM

## 2025-07-25 DIAGNOSIS — J18.9 PNEUMONIA OF LEFT LOWER LOBE DUE TO INFECTIOUS ORGANISM: Primary | ICD-10-CM

## 2025-07-25 DIAGNOSIS — J44.1 COPD EXACERBATION: ICD-10-CM

## 2025-07-25 DIAGNOSIS — R79.89 ELEVATED TROPONIN: ICD-10-CM

## 2025-07-25 DIAGNOSIS — M54.89 VERTEBROGENIC PAIN: Primary | ICD-10-CM

## 2025-07-25 DIAGNOSIS — M51.360 DEGENERATION OF INTERVERTEBRAL DISC OF LUMBAR REGION WITH DISCOGENIC BACK PAIN: ICD-10-CM

## 2025-07-25 LAB
ABSOLUTE EOSINOPHIL (OHS): 0.07 K/UL
ABSOLUTE MONOCYTE (OHS): 0.6 K/UL (ref 0.3–1)
ABSOLUTE NEUTROPHIL COUNT (OHS): 5.12 K/UL (ref 1.8–7.7)
ALBUMIN SERPL BCP-MCNC: 3.7 G/DL (ref 3.5–5.2)
ALP SERPL-CCNC: 71 UNIT/L (ref 40–150)
ALT SERPL W/O P-5'-P-CCNC: 13 UNIT/L (ref 10–44)
ANION GAP (OHS): 9 MMOL/L (ref 8–16)
AST SERPL-CCNC: 28 UNIT/L (ref 11–45)
BASOPHILS # BLD AUTO: 0.04 K/UL
BASOPHILS NFR BLD AUTO: 0.5 %
BILIRUB SERPL-MCNC: 0.7 MG/DL (ref 0.1–1)
BUN SERPL-MCNC: 10 MG/DL (ref 8–23)
CALCIUM SERPL-MCNC: 9.2 MG/DL (ref 8.7–10.5)
CHLORIDE SERPL-SCNC: 105 MMOL/L (ref 95–110)
CO2 SERPL-SCNC: 27 MMOL/L (ref 23–29)
CREAT SERPL-MCNC: 1.2 MG/DL (ref 0.5–1.4)
CTP QC/QA: YES
CTP QC/QA: YES
ERYTHROCYTE [DISTWIDTH] IN BLOOD BY AUTOMATED COUNT: 14.1 % (ref 11.5–14.5)
GFR SERPLBLD CREATININE-BSD FMLA CKD-EPI: >60 ML/MIN/1.73/M2
GLUCOSE SERPL-MCNC: 108 MG/DL (ref 70–110)
HCT VFR BLD AUTO: 44.3 % (ref 40–54)
HGB BLD-MCNC: 14.3 GM/DL (ref 14–18)
IMM GRANULOCYTES # BLD AUTO: 0.02 K/UL (ref 0–0.04)
IMM GRANULOCYTES NFR BLD AUTO: 0.3 % (ref 0–0.5)
LYMPHOCYTES # BLD AUTO: 1.43 K/UL (ref 1–4.8)
MCH RBC QN AUTO: 27.2 PG (ref 27–31)
MCHC RBC AUTO-ENTMCNC: 32.3 G/DL (ref 32–36)
MCV RBC AUTO: 84 FL (ref 82–98)
NT-PROBNP SERPL-MCNC: 72 PG/ML
NUCLEATED RBC (/100WBC) (OHS): 0 /100 WBC
PLATELET # BLD AUTO: 225 K/UL (ref 150–450)
PMV BLD AUTO: 9.6 FL (ref 9.2–12.9)
POC MOLECULAR INFLUENZA A AGN: NEGATIVE
POC MOLECULAR INFLUENZA B AGN: NEGATIVE
POTASSIUM SERPL-SCNC: 3.1 MMOL/L (ref 3.5–5.1)
PROT SERPL-MCNC: 7 GM/DL (ref 6–8.4)
RBC # BLD AUTO: 5.25 M/UL (ref 4.6–6.2)
RELATIVE EOSINOPHIL (OHS): 1 %
RELATIVE LYMPHOCYTE (OHS): 19.6 % (ref 18–48)
RELATIVE MONOCYTE (OHS): 8.2 % (ref 4–15)
RELATIVE NEUTROPHIL (OHS): 70.4 % (ref 38–73)
SARS-COV-2 RDRP RESP QL NAA+PROBE: NEGATIVE
SODIUM SERPL-SCNC: 141 MMOL/L (ref 136–145)
TROPONIN I SERPL HS-MCNC: 53 NG/L
TROPONIN I SERPL HS-MCNC: 53 NG/L
WBC # BLD AUTO: 7.28 K/UL (ref 3.9–12.7)

## 2025-07-25 PROCEDURE — 25500020 PHARM REV CODE 255

## 2025-07-25 PROCEDURE — 84484 ASSAY OF TROPONIN QUANT: CPT

## 2025-07-25 PROCEDURE — 94640 AIRWAY INHALATION TREATMENT: CPT | Mod: XB

## 2025-07-25 PROCEDURE — 83880 ASSAY OF NATRIURETIC PEPTIDE: CPT

## 2025-07-25 PROCEDURE — 99900035 HC TECH TIME PER 15 MIN (STAT)

## 2025-07-25 PROCEDURE — 99999 PR PBB SHADOW E&M-EST. PATIENT-LVL IV: CPT | Mod: PBBFAC,,, | Performed by: ANESTHESIOLOGY

## 2025-07-25 PROCEDURE — 93010 ELECTROCARDIOGRAM REPORT: CPT | Mod: ,,, | Performed by: INTERNAL MEDICINE

## 2025-07-25 PROCEDURE — 99285 EMERGENCY DEPT VISIT HI MDM: CPT | Mod: 25

## 2025-07-25 PROCEDURE — 63600175 PHARM REV CODE 636 W HCPCS

## 2025-07-25 PROCEDURE — 87635 SARS-COV-2 COVID-19 AMP PRB: CPT

## 2025-07-25 PROCEDURE — 94760 N-INVAS EAR/PLS OXIMETRY 1: CPT

## 2025-07-25 PROCEDURE — 96374 THER/PROPH/DIAG INJ IV PUSH: CPT | Mod: 59

## 2025-07-25 PROCEDURE — 25000003 PHARM REV CODE 250

## 2025-07-25 PROCEDURE — 85025 COMPLETE CBC W/AUTO DIFF WBC: CPT

## 2025-07-25 PROCEDURE — 25000242 PHARM REV CODE 250 ALT 637 W/ HCPCS

## 2025-07-25 PROCEDURE — 80053 COMPREHEN METABOLIC PANEL: CPT

## 2025-07-25 PROCEDURE — 93005 ELECTROCARDIOGRAM TRACING: CPT

## 2025-07-25 RX ORDER — ONDANSETRON HYDROCHLORIDE 2 MG/ML
4 INJECTION, SOLUTION INTRAVENOUS
Status: COMPLETED | OUTPATIENT
Start: 2025-07-25 | End: 2025-07-25

## 2025-07-25 RX ORDER — ASPIRIN 325 MG
325 TABLET ORAL
Status: COMPLETED | OUTPATIENT
Start: 2025-07-25 | End: 2025-07-25

## 2025-07-25 RX ORDER — ALBUTEROL SULFATE 2.5 MG/.5ML
2.5 SOLUTION RESPIRATORY (INHALATION)
Status: DISPENSED | OUTPATIENT
Start: 2025-07-25 | End: 2025-07-25

## 2025-07-25 RX ORDER — PREDNISONE 10 MG/1
10 TABLET ORAL DAILY
Qty: 21 TABLET | Refills: 0 | Status: SHIPPED | OUTPATIENT
Start: 2025-07-25

## 2025-07-25 RX ORDER — PREDNISONE 20 MG/1
60 TABLET ORAL
Status: COMPLETED | OUTPATIENT
Start: 2025-07-25 | End: 2025-07-25

## 2025-07-25 RX ORDER — LEVOFLOXACIN 750 MG/1
750 TABLET, FILM COATED ORAL
Status: COMPLETED | OUTPATIENT
Start: 2025-07-25 | End: 2025-07-25

## 2025-07-25 RX ORDER — IPRATROPIUM BROMIDE AND ALBUTEROL SULFATE 2.5; .5 MG/3ML; MG/3ML
3 SOLUTION RESPIRATORY (INHALATION)
Status: COMPLETED | OUTPATIENT
Start: 2025-07-25 | End: 2025-07-25

## 2025-07-25 RX ORDER — METHYLPREDNISOLONE SOD SUCC 125 MG
125 VIAL (EA) INJECTION
Status: DISCONTINUED | OUTPATIENT
Start: 2025-07-25 | End: 2025-07-25

## 2025-07-25 RX ORDER — LEVOFLOXACIN 750 MG/1
750 TABLET, FILM COATED ORAL DAILY
Qty: 5 TABLET | Refills: 0 | Status: SHIPPED | OUTPATIENT
Start: 2025-07-25 | End: 2025-07-30

## 2025-07-25 RX ORDER — CEFTRIAXONE 1 G/1
1 INJECTION, POWDER, FOR SOLUTION INTRAMUSCULAR; INTRAVENOUS
Status: DISCONTINUED | OUTPATIENT
Start: 2025-07-25 | End: 2025-07-25

## 2025-07-25 RX ORDER — AZITHROMYCIN 250 MG/1
500 TABLET, FILM COATED ORAL
Status: DISCONTINUED | OUTPATIENT
Start: 2025-07-25 | End: 2025-07-25

## 2025-07-25 RX ORDER — TRAMADOL HYDROCHLORIDE 50 MG/1
50 TABLET, FILM COATED ORAL
Status: COMPLETED | OUTPATIENT
Start: 2025-07-25 | End: 2025-07-25

## 2025-07-25 RX ADMIN — PREDNISONE 60 MG: 20 TABLET ORAL at 06:07

## 2025-07-25 RX ADMIN — ONDANSETRON 4 MG: 2 INJECTION INTRAMUSCULAR; INTRAVENOUS at 04:07

## 2025-07-25 RX ADMIN — ALBUTEROL SULFATE 2.5 MG: 2.5 SOLUTION RESPIRATORY (INHALATION) at 06:07

## 2025-07-25 RX ADMIN — IOHEXOL 100 ML: 350 INJECTION, SOLUTION INTRAVENOUS at 05:07

## 2025-07-25 RX ADMIN — IPRATROPIUM BROMIDE AND ALBUTEROL SULFATE 3 ML: 2.5; .5 SOLUTION RESPIRATORY (INHALATION) at 03:07

## 2025-07-25 RX ADMIN — IPRATROPIUM BROMIDE AND ALBUTEROL SULFATE 3 ML: 2.5; .5 SOLUTION RESPIRATORY (INHALATION) at 04:07

## 2025-07-25 RX ADMIN — LEVOFLOXACIN 750 MG: 750 TABLET, FILM COATED ORAL at 07:07

## 2025-07-25 RX ADMIN — TRAMADOL HYDROCHLORIDE 50 MG: 50 TABLET, COATED ORAL at 03:07

## 2025-07-25 RX ADMIN — ASPIRIN 325 MG: 325 TABLET ORAL at 04:07

## 2025-07-25 NOTE — ED TRIAGE NOTES
Pt arrived to ED complaining of CP and increased SOB for a week. Pt states that he has all kinds of breathing problems and is on oxygen at home but he's not getting better. Pt has bilateral pedal edema noted.
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
## 2025-07-26 NOTE — DISCHARGE INSTRUCTIONS
Diagnosis:  Pneumonia, COPD exacerbation    Tests today showed:   Labs Reviewed   COMPREHENSIVE METABOLIC PANEL - Abnormal       Result Value    Sodium 141      Potassium 3.1 (*)     Chloride 105      CO2 27      Glucose 108      BUN 10      Creatinine 1.2      Calcium 9.2      Protein Total 7.0      Albumin 3.7      Bilirubin Total 0.7      ALP 71      AST 28      ALT 13      Anion Gap 9      eGFR >60     TROPONIN I HIGH SENSITIVITY - Abnormal    Troponin High Sensitive 53 (*)    TROPONIN I HIGH SENSITIVITY - Abnormal    Troponin High Sensitive 53 (*)    NT-PRO NATRIURETIC PEPTIDE - Normal    NT-proBNP 72      Narrative:     NOTE:  Access complete set of age - and/or gender-specific reference intervals for this test in the [facility] Collection Manual.   CBC WITH DIFFERENTIAL - Normal    WBC 7.28      RBC 5.25      HGB 14.3      HCT 44.3      MCV 84      MCH 27.2      MCHC 32.3      RDW 14.1      Platelet Count 225      MPV 9.6      Nucleated RBC 0      Neut % 70.4      Lymph % 19.6      Mono % 8.2      Eos % 1.0      Basophil % 0.5      Imm Grans % 0.3      Neut # 5.12      Lymph # 1.43      Mono # 0.60      Eos # 0.07      Baso # 0.04      Imm Grans # 0.02     CBC W/ AUTO DIFFERENTIAL    Narrative:     The following orders were created for panel order CBC auto differential.  Procedure                               Abnormality         Status                     ---------                               -----------         ------                     CBC with Differential[[id number]]       Normal              Final result                 Please view results for these tests on the individual orders.   SARS-COV-2 RDRP GENE    POC Rapid COVID Negative       Acceptable Yes     POCT INFLUENZA A/B MOLECULAR    POC Molecular Influenza A Ag Negative      POC Molecular Influenza B Ag Negative       Acceptable Yes       CTA Chest Non-Coronary (PE Studies)   Final Result   IMPRESSION:   1.   No central pulmonary thromboemboli identified. Extensive streak and motion artifact obscuring the lobar branches limiting evaluation.      2.  Elevated right hemidiaphragm with persistent lateral right lower lobe and worsened left lower lobe consolidation and volume loss likely due to atelectasis versus pneumonia. Suggest follow-up chest CT in 3 months for reassessment.      3.  Cardiomegaly. Dilated main pulmonary artery can be seen with pulmonary arterial hypertension.      4.  Stable fusiform borderline mid ascending thoracic aortic ectasia measuring 4 cm.      5.  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 Consider nonemergent thyroid sonographic correlation.      -Electronically Signed By: Yahaira Rendon MD    -Electronically Signed On:  7/25/2025 5:40 PM         Report Ends      X-Ray Chest AP Portable   Final Result      Reduced lung volumes of subsegmental left basilar atelectasis.         Electronically signed by: Chloe Isbell   Date:    07/25/2025   Time:    16:20          Treatments you had today:   Medications   albuterol sulfate nebulizer solution 2.5 mg (2.5 mg Nebulization Given 7/25/25 1829)   albuterol-ipratropium 2.5 mg-0.5 mg/3 mL nebulizer solution 3 mL (3 mLs Nebulization Given 7/25/25 1602)   traMADoL tablet 50 mg (50 mg Oral Given 7/25/25 1547)   aspirin tablet 325 mg (325 mg Oral Given 7/25/25 1631)   ondansetron injection 4 mg (4 mg Intravenous Given 7/25/25 1643)   iohexoL (OMNIPAQUE 350) injection 100 mL (100 mLs Intravenous Given 7/25/25 1725)   predniSONE tablet 60 mg (60 mg Oral Given 7/25/25 1823)   levoFLOXacin tablet 750 mg (750 mg Oral Given 7/25/25 1912)       Follow-Up Plan:  - Follow-up with primary care doctor within 3 - 5 days  - Additional testing and/or evaluation as directed by your primary doctor    Return to the Emergency Department for symptoms including but not limited to: worsening symptoms, shortness of breath or chest pain, vomiting with inability  to hold down fluids, fevers greater than 100.4°F, passing out/fainting/unconsciousness, or other concerning symptoms.

## 2025-07-27 LAB
OHS QRS DURATION: 96 MS
OHS QTC CALCULATION: 429 MS

## 2025-07-31 ENCOUNTER — NURSE TRIAGE (OUTPATIENT)
Dept: ADMINISTRATIVE | Facility: CLINIC | Age: 65
End: 2025-07-31
Payer: MEDICARE

## 2025-07-31 ENCOUNTER — HOSPITAL ENCOUNTER (EMERGENCY)
Facility: OTHER | Age: 65
Discharge: HOME | End: 2025-07-31
Attending: EMERGENCY MEDICINE
Payer: MEDICARE

## 2025-07-31 VITALS
WEIGHT: 250 LBS | OXYGEN SATURATION: 94 % | SYSTOLIC BLOOD PRESSURE: 133 MMHG | HEART RATE: 87 BPM | TEMPERATURE: 98 F | HEIGHT: 71 IN | BODY MASS INDEX: 35 KG/M2 | DIASTOLIC BLOOD PRESSURE: 88 MMHG | RESPIRATION RATE: 16 BRPM

## 2025-07-31 DIAGNOSIS — G89.29 CHRONIC BILATERAL BACK PAIN, UNSPECIFIED BACK LOCATION: Primary | ICD-10-CM

## 2025-07-31 DIAGNOSIS — M54.9 CHRONIC BILATERAL BACK PAIN, UNSPECIFIED BACK LOCATION: Primary | ICD-10-CM

## 2025-07-31 DIAGNOSIS — R07.89 CHEST DISCOMFORT: ICD-10-CM

## 2025-07-31 DIAGNOSIS — R10.9 ABDOMINAL PAIN: ICD-10-CM

## 2025-07-31 LAB
ABSOLUTE EOSINOPHIL (OHS): 0.1 K/UL
ABSOLUTE MONOCYTE (OHS): 0.87 K/UL (ref 0.3–1)
ABSOLUTE NEUTROPHIL COUNT (OHS): 4.9 K/UL (ref 1.8–7.7)
ALBUMIN SERPL BCP-MCNC: 3.5 G/DL (ref 3.5–5.2)
ALP SERPL-CCNC: 61 UNIT/L (ref 40–150)
ALT SERPL W/O P-5'-P-CCNC: 14 UNIT/L (ref 10–44)
ANION GAP (OHS): 7 MMOL/L (ref 8–16)
AST SERPL-CCNC: 26 UNIT/L (ref 11–45)
BASOPHILS # BLD AUTO: 0.06 K/UL
BASOPHILS NFR BLD AUTO: 0.8 %
BILIRUB SERPL-MCNC: 0.4 MG/DL (ref 0.1–1)
BILIRUB UR QL STRIP.AUTO: NEGATIVE
BUN SERPL-MCNC: 14 MG/DL (ref 8–23)
CALCIUM SERPL-MCNC: 8.9 MG/DL (ref 8.7–10.5)
CHLORIDE SERPL-SCNC: 104 MMOL/L (ref 95–110)
CLARITY UR: CLEAR
CO2 SERPL-SCNC: 27 MMOL/L (ref 23–29)
COLOR UR AUTO: YELLOW
CREAT SERPL-MCNC: 1.4 MG/DL (ref 0.5–1.4)
ERYTHROCYTE [DISTWIDTH] IN BLOOD BY AUTOMATED COUNT: 14.2 % (ref 11.5–14.5)
GFR SERPLBLD CREATININE-BSD FMLA CKD-EPI: 55 ML/MIN/1.73/M2
GLUCOSE SERPL-MCNC: 84 MG/DL (ref 70–110)
GLUCOSE UR QL STRIP: NEGATIVE
HCT VFR BLD AUTO: 45.1 % (ref 40–54)
HGB BLD-MCNC: 14.1 GM/DL (ref 14–18)
HGB UR QL STRIP: NEGATIVE
IMM GRANULOCYTES # BLD AUTO: 0.03 K/UL (ref 0–0.04)
IMM GRANULOCYTES NFR BLD AUTO: 0.4 % (ref 0–0.5)
KETONES UR QL STRIP: NEGATIVE
LEUKOCYTE ESTERASE UR QL STRIP: NEGATIVE
LIPASE SERPL-CCNC: 20 U/L (ref 4–60)
LYMPHOCYTES # BLD AUTO: 1.46 K/UL (ref 1–4.8)
MCH RBC QN AUTO: 26.9 PG (ref 27–31)
MCHC RBC AUTO-ENTMCNC: 31.3 G/DL (ref 32–36)
MCV RBC AUTO: 86 FL (ref 82–98)
NITRITE UR QL STRIP: NEGATIVE
NT-PROBNP SERPL-MCNC: 53 PG/ML
NUCLEATED RBC (/100WBC) (OHS): 0 /100 WBC
PH UR STRIP: 7 [PH]
PLATELET # BLD AUTO: 225 K/UL (ref 150–450)
PMV BLD AUTO: 9.7 FL (ref 9.2–12.9)
POTASSIUM SERPL-SCNC: 3.9 MMOL/L (ref 3.5–5.1)
PROT SERPL-MCNC: 6.8 GM/DL (ref 6–8.4)
PROT UR QL STRIP: NEGATIVE
RBC # BLD AUTO: 5.25 M/UL (ref 4.6–6.2)
RELATIVE EOSINOPHIL (OHS): 1.3 %
RELATIVE LYMPHOCYTE (OHS): 19.7 % (ref 18–48)
RELATIVE MONOCYTE (OHS): 11.7 % (ref 4–15)
RELATIVE NEUTROPHIL (OHS): 66.1 % (ref 38–73)
SODIUM SERPL-SCNC: 138 MMOL/L (ref 136–145)
SP GR UR STRIP: 1.01
TROPONIN I SERPL HS-MCNC: 46 NG/L
UROBILINOGEN UR STRIP-ACNC: NEGATIVE EU/DL
WBC # BLD AUTO: 7.42 K/UL (ref 3.9–12.7)

## 2025-07-31 PROCEDURE — 85025 COMPLETE CBC W/AUTO DIFF WBC: CPT

## 2025-07-31 PROCEDURE — 84484 ASSAY OF TROPONIN QUANT: CPT

## 2025-07-31 PROCEDURE — 96374 THER/PROPH/DIAG INJ IV PUSH: CPT

## 2025-07-31 PROCEDURE — 81003 URINALYSIS AUTO W/O SCOPE: CPT

## 2025-07-31 PROCEDURE — 93005 ELECTROCARDIOGRAM TRACING: CPT

## 2025-07-31 PROCEDURE — 63600175 PHARM REV CODE 636 W HCPCS

## 2025-07-31 PROCEDURE — 83690 ASSAY OF LIPASE: CPT

## 2025-07-31 PROCEDURE — 93010 ELECTROCARDIOGRAM REPORT: CPT | Mod: ,,, | Performed by: INTERNAL MEDICINE

## 2025-07-31 PROCEDURE — 83880 ASSAY OF NATRIURETIC PEPTIDE: CPT

## 2025-07-31 PROCEDURE — 25000003 PHARM REV CODE 250

## 2025-07-31 PROCEDURE — 80053 COMPREHEN METABOLIC PANEL: CPT

## 2025-07-31 PROCEDURE — 25500020 PHARM REV CODE 255

## 2025-07-31 PROCEDURE — 99285 EMERGENCY DEPT VISIT HI MDM: CPT | Mod: 25

## 2025-07-31 RX ORDER — TRAMADOL HYDROCHLORIDE 50 MG/1
50 TABLET, FILM COATED ORAL EVERY 6 HOURS PRN
Qty: 12 TABLET | Refills: 0 | Status: SHIPPED | OUTPATIENT
Start: 2025-07-31

## 2025-07-31 RX ORDER — MORPHINE SULFATE 2 MG/ML
2 INJECTION, SOLUTION INTRAMUSCULAR; INTRAVENOUS
Refills: 0 | Status: COMPLETED | OUTPATIENT
Start: 2025-07-31 | End: 2025-07-31

## 2025-07-31 RX ORDER — TRAMADOL HYDROCHLORIDE 50 MG/1
50 TABLET, FILM COATED ORAL
Status: COMPLETED | OUTPATIENT
Start: 2025-07-31 | End: 2025-07-31

## 2025-07-31 RX ADMIN — IOHEXOL 100 ML: 350 INJECTION, SOLUTION INTRAVENOUS at 06:07

## 2025-07-31 RX ADMIN — MORPHINE SULFATE 2 MG: 2 INJECTION, SOLUTION INTRAMUSCULAR; INTRAVENOUS at 07:07

## 2025-07-31 RX ADMIN — TRAMADOL HYDROCHLORIDE 50 MG: 50 TABLET, COATED ORAL at 09:07
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
## 2025-07-31 NOTE — TELEPHONE ENCOUNTER
Patient c/o chest pain that travels to his abdomen and leg. He is currently taking antibiotics for pneumonia. Advised per protocol to go to the nearest ED now. Advised the patient to call back with any further questions or if symptoms worsen.        Reason for Disposition   New-onset or worsening chest pain    Additional Information   Negative: SEVERE difficulty breathing (e.g., struggling for each breath, speaks in single words)   Negative: Bluish (or gray) lips or face now   Negative: Difficult to awaken or acting confused (e.g., disoriented, slurred speech)   Negative: Stridor   Negative: Slow, shallow and weak breathing   Negative: Sounds like a life-threatening emergency to the triager    Protocols used: Pneumonia on Antibiotic Follow-up Call-A-[facility]

## 2025-08-01 LAB
HOLD SPECIMEN: NORMAL
OHS QRS DURATION: 92 MS
OHS QTC CALCULATION: 403 MS

## 2025-08-01 NOTE — DISCHARGE INSTRUCTIONS
Please follow up with your PCP and with pain management    I have sent you a short course of tramadol to help relieve your back pain.  This is a narcotic opioid medication and may cause constipation and respiratory distress.  Please ensure you are using your home oxygen as prescribed.  Please ensure you are eating a high-fiber diet including fresh fruits and vegetables to prevent constipation.  May want to take a daily Metamucil while on this medication.    I highly encourage performing daily exercises to help strengthen your back and abdominal core muscles.

## 2025-08-26 ENCOUNTER — TELEPHONE (OUTPATIENT)
Dept: ENDOSCOPY | Facility: HOSPITAL | Age: 65
End: 2025-08-26
Payer: MEDICARE

## 2025-08-26 ENCOUNTER — TELEPHONE (OUTPATIENT)
Dept: GASTROENTEROLOGY | Facility: CLINIC | Age: 65
End: 2025-08-26
Payer: MEDICARE

## (undated) DEVICE — SUT MCRYL PLUS 4-0 PS2 27IN

## (undated) DEVICE — NDL INSUFFLATION VERRES 120MM

## (undated) DEVICE — SYS SEE SHARP SCOPE ANTIFOG

## (undated) DEVICE — SOL NACL STRL BOTTLE 1000ML

## (undated) DEVICE — SOL NS 1000CC

## (undated) DEVICE — ADHESIVE DERMABOND ADVANCED

## (undated) DEVICE — DRAIN WND 15FRX3/16X4.7MM TRCR

## (undated) DEVICE — PENCIL ELECTROSURG HOLST W/BLD

## (undated) DEVICE — TROCAR KII FIOS 5MM X 100MM

## (undated) DEVICE — SEE MEDLINE ITEM 156925

## (undated) DEVICE — SHEARS HARMONIC 36CM HD 1000I

## (undated) DEVICE — APPLIER CLIP EPIX UNIV 5X34

## (undated) DEVICE — EVACUATOR WOUND BULB 100CC

## (undated) DEVICE — TROCAR KII FIOS 11MM X 100MM

## (undated) DEVICE — ELECTRODE REM PLYHSV RETURN 9

## (undated) DEVICE — IRRIGATOR ENDOSCOPY DISP.

## (undated) DEVICE — NDL HYPO REG 25G X 1 1/2

## (undated) DEVICE — SYR B-D DISP CONTROL 10CC100/C

## (undated) DEVICE — KIT WING PAD POSITIONING

## (undated) DEVICE — SOL PVP-I SCRUB 7.5% 4OZ

## (undated) DEVICE — SUT VICRYL 0 27 CT-2